# Patient Record
Sex: FEMALE | Race: WHITE | Employment: OTHER | URBAN - METROPOLITAN AREA
[De-identification: names, ages, dates, MRNs, and addresses within clinical notes are randomized per-mention and may not be internally consistent; named-entity substitution may affect disease eponyms.]

---

## 2017-03-13 ENCOUNTER — HOSPITAL ENCOUNTER (OUTPATIENT)
Dept: ULTRASOUND IMAGING | Age: 77
Discharge: HOME | End: 2017-03-13
Attending: UROLOGY
Payer: MEDICARE

## 2017-03-13 DIAGNOSIS — N20.0 KIDNEY STONES: ICD-10-CM

## 2017-03-13 PROCEDURE — 76770 US EXAM ABDO BACK WALL COMP: CPT

## 2017-06-22 ENCOUNTER — HOSPITAL ENCOUNTER (OUTPATIENT)
Dept: PHYSICAL THERAPY | Age: 77
Discharge: HOME | End: 2017-06-22
Payer: MEDICARE

## 2017-06-22 PROCEDURE — G8979 MOBILITY GOAL STATUS: HCPCS

## 2017-06-22 PROCEDURE — G8978 MOBILITY CURRENT STATUS: HCPCS

## 2017-06-22 PROCEDURE — 97110 THERAPEUTIC EXERCISES: CPT

## 2017-06-22 PROCEDURE — 97530 THERAPEUTIC ACTIVITIES: CPT

## 2017-06-22 PROCEDURE — 97162 PT EVAL MOD COMPLEX 30 MIN: CPT

## 2017-06-22 NOTE — PROGRESS NOTES
PHYSICAL THERAPY - DAILY TREATMENT NOTE    Patient Name: Bill Catalan        Date: 2017  : 1940   YES Patient  Verified  Visit #:   1     Insurance: Payor: Stefania Blanco / Plan: [place] MED[place] A & B / Product Type: Medicare /      In time: 11:00 Out time: 11:56   Total Treatment Time: 64     Medicare Time Tracking (below)   Total Timed Codes (min):  56 1:1 Treatment Time:  56     TREATMENT AREA =  R ankle    SUBJECTIVE    Pain Level (on 0 to 10 scale):  7  / 10   Medication Changes/New allergies or changes in medical history, any new surgeries or procedures? 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 Patient reports pain began now 19, reports she was at inside field hockey game and was sitting on bleachers and she tucked legs under her to let someone pass and back of heel hit back of bleachers, and pain began. Patient reports she then kept hitting heel on multiple things, and each time the pain got worse. 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 Patient reports sometimes pain subsides, however she has been having trouble walking, especially when having to quickly cross street.      Aggravating Factors: walking, standing, balancing on R foot    Alleviating Factors: ice, elevation, diclofenac cream    Previous Treatment:  Medication and cream, heel lift on R    PMHx:see chart    Social/Recreational/Work: enjoys shopping/antiquing, people watching at the mall, playing with grandchildren    Pt Goals: \"be able to walk better and the pain to go\"    FOTO: 40         OBJECTIVE  Physical Therapy Evaluation  - Foot and Ankle    Gait: [] Normal    [x] Abnormal    [x] Antalgic    [] NWB Device:  Describe: decreased weight shift R, decreased step length B, inability to advance R tibia over foot due to limited DF    ROM/Strength  [] Unable to assess at this time      AROM        PROM            Strength (1-5)   Left Right Left Right Left  Right   Dorsiflexion 6 -5   4+ 4+   Plantarflexion 54 40   4+ 4+   Eversion 14 3   4 4   Inversion 27 28   4 4   Great Toe Ext 65 67       DF with knee flexion 11 10         Flexibility: [] Unable to assess at this time  Gastroc:    (L) Tightness [] WNL   [] Min   [] Mod   [] Severe    (R) Tightness [] WNL   [] Min   [] Mod   [] Severe  Soleus:    (L) Tightness [] WNL   [] Min   [] Mod   [] Severe    (R) Tightness [] WNL   [] Min   [] Mod   [] Severe  Other:      (L) Tightness [] WNL   [] Min   [] Mod   [] Severe    (R) Tightness [] WNL   [] Min   [] Mod   [] Severe    Palpation: tightness noted over R>L plantar fascia, discomfort to palpation under R PF insertion, TTP posterior heel at achilles insertion    Optional Tests:   Navicular Drop: Right  mm Left  mm    Sub-talar alignment: [] Neutral     [] Pronation      [] Supination    Forefoot alignment:  [] Neutral     [] Varus            [] Valgus    Swelling:   Left (cm) Right (cm)   Figure 8:     Midfoot:      Malleoli Level:     MTH:        Anterior Drawer: [] Neg    [] Pos  Posterior Drawer:  [] Neg    [] Pos  Inversion Stress:  [] Neg    [] Pos  Talar Tilt:   [] Neg    [] Pos  Eversion Stress:  [] Neg    [] Pos  Kiana's Sign:  [] Neg    [] Pos  Garcias Test: [] Neg    [] Pos    15 min Therapeutic Exercise:  [x]  See flow sheet   Rationale:      increase ROM, increase strength and improve coordination to improve the patients ability to perform ADL's and amb with decreased pain and improved ease     10 min Therapeutic Activity: FOTO   Rationale:   Determine functional limitations     min Patient Education:  YES  Reviewed HEP   []  Progressed/Changed HEP based on:   HEP initiated     Other Objective/Functional Measures:    Patient reports going down stairs backwards, advised patient on decreased safety     Post Treatment Pain Level (on 0 to 10) scale:   6  / 10     ASSESSMENT    Assessment/Changes in Function:     Justification for Eval Code Complexity:  Patient History (low 0, mod 1-2, high 3-4): Hx significant kidney stones requiring multiple surgeries; allergies; R knee torn meniscus; sedentary; arthritis; back pain; BMI>30; previous accidents; kidney problems; visual impairment  Examination (low 1-2, mod 3+, high 4+):   Clinical Presentation (low stable or uncomplicated, mod evolving or changing, high unstable or unpredictable):   Clinical Decision Making (low , mod 26-74, high 1-25): FOTO     See PoC     []  See Progress Note/Recertification   Patient will continue to benefit from skilled PT services to modify and progress therapeutic interventions, address functional mobility deficits, address ROM deficits, address strength deficits, analyze and address soft tissue restrictions, analyze and cue movement patterns, analyze and modify body mechanics/ergonomics and assess and modify postural abnormalities to attain remaining goals.    Progress toward goals / Updated goals:    Goals established, see PoC     PLAN    [x]  Upgrade activities as tolerated YES Continue plan of care   []  Discharge due to :    [x]  Other: Initiate PoC     Therapist: Federico Goodpasture, PT    Date: 6/22/2017 Time: 11:04 AM

## 2017-06-22 NOTE — PROGRESS NOTES
[address] [facility] PHYSICAL THERAPY  [address] [facility], [address] - Phone: [phone]  Fax: [phone] / [address] [facility]  Patient Name: Hipolito Jerry : 1940   Medical   Diagnosis: Right ankle pain [M25.571] Treatment Diagnosis: R achilles tendonitis   Onset Date: Initial injury 10 years ago  Reoccurrence 17     Referral Source: Susannah Whitney MD [facility]): 2017   Prior Hospitalization: See medical history Provider #: [id number]   Prior Level of Function: enjoys shopping/antiquing, people watching at the mall, playing with grandchildren   Comorbidities: Hx significant kidney stones requiring multiple surgeries; allergies; R knee torn meniscus; sedentary; arthritis; back pain; BMI>30; previous accidents; kidney problems; visual impairment   Medications: Verified on Patient Summary List   The Plan of Care and following information is based on the information from the initial evaluation.   ===========================================================================================  Assessment / key information:  Hipolito Jerry is a 78 y.o.  female with Dx: Right ankle pain [M25.571], signs and symptoms consistent with R achilles tendonitis. Patient reports 10 years ago she injured R foot, and developed Daisy's deformity, and at that time treated with open backed shoes. Patient reports then on 17 she was sitting on bleachers and hit back of foot against metal back, and pain reoccurred. Patient reports Daisy's deformity has increased, and reports pain with amb/standing. Objective: Patient demonstrates impaired gait, including inability to advance R tibia over foot due to limited DF, significantly decreased R ankle AROM and decreased ankle strength B. Tightness noted over R>L plantar fascia, discomfort to palpation under R PF insertion, TTP posterior heel at achilles insertion. Patient presents with R heel lift in place in shoe. AROM                                                       Strength (1-5)    Left Right Left  Right   Dorsiflexion 6 -5 4+ 4+   Plantarflexion 54 40 4+ 4+   Eversion 14 3 4 4   Inversion 27 28 4 4   Great Toe Ext 65 67       DF with knee flexion 11 10         FOTO score 40 points indicating 60% limitation in functional ability. Patient would benefit from skilled PT to address the below listed impairments.  Thank you for your referral.  ===========================================================================================  Eval Complexity: History: HIGH Complexity :3+ comorbidities / personal factors will impact the outcome/ POC Exam:HIGH Complexity : 4+ Standardized tests and measures addressing body structure, function, activity limitation and / or participation in recreation  Presentation: MEDIUM Complexity : Evolving with changing characteristics  Clinical Decision Making:MEDIUM Complexity : FOTO score of 26-74Overall Complexity:MEDIUM  Problem List: pain affecting function, decrease ROM, decrease strength, edema affecting function, impaired gait/ balance, decrease ADL/ functional abilitiies, decrease activity tolerance, decrease flexibility/ joint mobility and decrease transfer abilities   Treatment Plan may include any combination of the following: Therapeutic exercise, Therapeutic activities, Neuromuscular re-education, Physical agent/modality, Gait/balance training, Manual therapy, Aquatic therapy, Patient education, Self Care training, Functional mobility training, Home safety training and Stair training  Patient / Family readiness to learn indicated by: asking questions, trying to perform skills and interest  Persons(s) to be included in education: patient (P)  Barriers to Learning/Limitations: None  Measures taken: na   Patient Goal (s): \"be able to walk better and the pain to go\"   Patient self reported health status: good  Rehabilitation Potential: good   Short Term Goals: To be accomplished in  2-3  weeks:  1. Patient will demonstrate compliance with HEP for symptom management at home. 2. Patient will improve R ankle DF to neutral to normalize gait mechanics. 3. Patient will improve R ankle PF to 50 deg to improve toe off in gait.  Long Term Goals: To be accomplished in  4-6  weeks:  1. Patient will be independent with HEP to self-manage and prevent symptoms upon DC. 2.  Patient will improve FOTO score to 53 points to indicate improved functional status. 3.  Patient will improve R ankle DF to 5 degrees to improve gait mechanics. 4.  Patient will improve B ankle EV/IV strength to 4+/5 to improve stability with amb. Frequency / Duration:   Patient to be seen  1-2  times per week for 4-6  weeks:  Patient / Caregiver education and instruction: self care, activity modification and exercises  G-Codes (GP): Mobility [id number] Current  CL= 60-79%   Goal  CK= 40-59%. The severity rating is based on the FOTO Score    Therapist Signature: Killian Perez PT Date: 0/33/0398   Certification Period: 6/22/17-9/21/17 Time: 11:04 AM   ===========================================================================================  I certify that the above Physical Therapy Services are being furnished while the patient is under my care. I agree with the treatment plan and certify that this therapy is necessary. Physician Signature:        Date:       Time:     Please sign and return to [facility] or you may fax the signed copy to [phone]. Thank you.

## 2017-06-26 ENCOUNTER — HOSPITAL ENCOUNTER (OUTPATIENT)
Dept: PHYSICAL THERAPY | Age: 77
Discharge: HOME | End: 2017-06-26
Payer: MEDICARE

## 2017-06-26 PROCEDURE — 97110 THERAPEUTIC EXERCISES: CPT

## 2017-06-26 PROCEDURE — 97140 MANUAL THERAPY 1/> REGIONS: CPT

## 2017-06-26 NOTE — PROGRESS NOTES
PHYSICAL THERAPY - DAILY TREATMENT NOTE    Patient Name: Sanjeev Jane        Date: 2017  : 1940   YES Patient  Verified  Visit #:   2     Insurance: Payor: Jake Hsucer / Plan: [place] MEDICARE PART A & B / Product Type: Medicare /      In time: 5:35 Out time: 6:25   Total Treatment Time: 50     Medicare Time Tracking (below)   Total Timed Codes (min):  50 1:1 Treatment Time:  50     TREATMENT AREA =  Right ankle pain [M25.571]    SUBJECTIVE    Pain Level (on 0 to 10 scale):  9  / 10   Medication Changes/New allergies or changes in medical history, any new surgeries or procedures? NO     If yes, update Summary List   Subjective Functional Status/Changes:  []  No changes reported     \"I have some questions about my exercises. \"          OBJECTIVE    40 min Therapeutic Exercise:  [x]  See flow sheet   Rationale:      increase ROM and increase strength to improve the patients ability to perform ADL's with improved arch stability. 10 min Manual Therapy: Repeated DF, STM to plantar fascia and surrounding intrinsics, posterior talocrural mob's grade 3-4, inversion mob's to the navicular grade 3-4   Rationale:      decrease pain, increase ROM and increase tissue extensibility to improve patient's ability to ambulate with improved mechanics. min Patient Education:  YES  Reviewed HEP   []  Progressed/Changed HEP based on:   Patient reports compliance     Other Objective/Functional Measures:    Initiated standing HR's with no increase in pain and noticed increased difficulty with eversion AROM exercises. Manual interventions decreased symptoms. See flowsheet for more detials. Post Treatment Pain Level (on 0 to 10) scale:     / 10     ASSESSMENT    Assessment/Changes in Function:     Pt presents to PT able to initiate an arch and hip girdle stablity program without complications.      []  See Progress Note/Recertification   Patient will continue to benefit from skilled PT services to modify and progress therapeutic interventions, address functional mobility deficits, address ROM deficits, address strength deficits, analyze and address soft tissue restrictions, analyze and cue movement patterns and analyze and modify body mechanics/ergonomics to attain remaining goals. Progress toward goals / Updated goals:    First follow-up visit. Addressed independence with HEP.      PLAN    [x]  Upgrade activities as tolerated YES Continue plan of care   []  Discharge due to :    []  Other:      Therapist: Karime Gaming    Date: 6/26/2017 Time: 6:33 PM     Future Appointments  Date Time Provider Hilario Polanco   6/28/2017 5:30 PM [address] [place] [address]   7/5/2017 11:30 AM Emile Black, [address] [place] [address]   7/7/2017 11:30 AM Emile Black [facility]   7/11/2017 2:30 PM Emile Black, [facility]   7/14/2017 11:30 AM Emile Black [facility]   7/18/2017 11:30 AM Emile Black [facility]   7/21/2017 2:00 PM Emile Black, [facility]   7/24/2017 11:30 AM Emile Black, [facility]   7/28/2017 2:00 PM Emile Black, [facility]   11/8/2017 1:15 PM Johann Tran MD [address] [place] [address]

## 2017-06-28 ENCOUNTER — HOSPITAL ENCOUNTER (OUTPATIENT)
Dept: PHYSICAL THERAPY | Age: 77
Discharge: HOME | End: 2017-06-28
Payer: MEDICARE

## 2017-06-28 PROCEDURE — 97530 THERAPEUTIC ACTIVITIES: CPT

## 2017-06-28 PROCEDURE — 97110 THERAPEUTIC EXERCISES: CPT

## 2017-06-28 PROCEDURE — 97140 MANUAL THERAPY 1/> REGIONS: CPT

## 2017-06-28 NOTE — PROGRESS NOTES
PHYSICAL THERAPY - DAILY TREATMENT NOTE    Patient Name: Ton Parsons        Date: 2017  : 1940   YES Patient  Verified  Visit #:   3     Insurance: Payor: Ladi Baxterd / Plan: [place] MEDICARE PART A & B / Product Type: Medicare /      In time: 5:30 Out time: 6:30   Total Treatment Time: 60     Medicare Time Tracking (below)   Total Timed Codes (min):  55 1:1 Treatment Time:  55     TREATMENT AREA =  Right ankle pain [M25.571]    SUBJECTIVE    Pain Level (on 0 to 10 scale):  7  / 10   Medication Changes/New allergies or changes in medical history, any new surgeries or procedures? NO     If yes, update Summary List   Subjective Functional Status/Changes:  []  No changes reported     \"I am a little better. It hurts in the back the most. I have a hard time with stairs\"          OBJECTIVE    Modalities Rationale:  palliative      min [] Estim, type/location:                                      []  att     []  unatt     []  w/US     []  w/ice    []  w/heat    min []  Mechanical Traction: type/lbs                   []  pro   []  sup   []  int   []  cont    []  before manual    []  after manual    min []  Ultrasound, settings/location:      min []  Iontophoresis w/ dexamethasone, location:                                               []  take home patch       []  in clinic   5 min []  Ice     []  Heat    location/position: Ice cup to calcaneus    min []  Vasopneumatic Device, press/temp:     min []  Other:    [x] Skin assessment post-treatment (if applicable):   []  intact    []  redness- no adverse reaction     []redness  adverse reaction:      39 min Therapeutic Exercise:  [x]  See flow sheet   Rationale:      increase ROM and increase strength to improve the patients ability to perform ADL's with improved arch stability and AROM.      8 min Manual Therapy: Repeated DF, STM to plantar fascia and surrounding intrinsics, posterior talocrural mob's grade 3-4, inversion mob's to the navicular grade 3-4    8 min Therapeutic Activity: Relative SLS with hip 3-way, stair negotiation. ,Rationale: To increase safety and efficiency with ADL's.     min Patient Education:  YES  Reviewed HEP   []  Progressed/Changed HEP based on:   Patient reports compliance     Other Objective/Functional Measures:    Pt requires vc's to lift marbles from the ankle vs the hip. Pt can perform toe yoga with minimal vc's. Pt has bursitis related pain with hip abduction, therefore only performed flexion/extension. Post Treatment Pain Level (on 0 to 10) scale:   6  / 10     ASSESSMENT    Assessment/Changes in Function:     Pt presents to PT with a program addressing limiting the pt's pronation with ADL's.     []  See Progress Note/Recertification   Patient will continue to benefit from skilled PT services to modify and progress therapeutic interventions, address functional mobility deficits, address ROM deficits, address strength deficits, analyze and address soft tissue restrictions, analyze and cue movement patterns and analyze and modify body mechanics/ergonomics to attain remaining goals. Addressing DF AROM with manual interventions and HEP.      PLAN    [x]  Upgrade activities as tolerated YES Continue plan of care   []  Discharge due to :    []  Other:      Therapist: Lauren Waite    Date: 6/28/2017 Time: 6:47 PM     Future Appointments  Date Time Provider Hilario Polanco   7/5/2017 11:30 AM Douglas Dodd [address] [place] [address]   7/7/2017 11:30 AM Douglas Dodd PT [facility]   7/11/2017 2:30 PM Douglas Dodd PT [facility]   7/14/2017 11:30 AM Douglas Dodd PT [facility]   7/18/2017 11:30 AM Douglas Dodd PT [facility]   7/21/2017 2:00 PM Douglas Dodd PT [facility]   7/24/2017 11:30 AM Douglas Dodd PT [facility]   7/28/2017 2:00 PM Douglas Dodd PT [facility]   11/8/2017 1:15 PM Hillary Cronin MD [place] [address]

## 2017-07-05 ENCOUNTER — HOSPITAL ENCOUNTER (OUTPATIENT)
Dept: PHYSICAL THERAPY | Age: 77
Discharge: HOME | End: 2017-07-05
Payer: MEDICARE

## 2017-07-05 PROCEDURE — 97110 THERAPEUTIC EXERCISES: CPT

## 2017-07-05 PROCEDURE — 97140 MANUAL THERAPY 1/> REGIONS: CPT

## 2017-07-05 NOTE — PROGRESS NOTES
PHYSICAL THERAPY - DAILY TREATMENT NOTE    Patient Name: Cortez Moya        Date: 2017  : 1940   YES Patient  Verified  Visit #:     Insurance: Payor: Dany Naylor / Plan: [place] MEDICARE PART A & B / Product Type: Medicare /      In time: 11:28 Out time: 12:15   Total Treatment Time: 47     Medicare Time Tracking (below)   Total Timed Codes (min):  37 1:1 Treatment Time:  37     TREATMENT AREA =  Right ankle pain [M25.571]    SUBJECTIVE    Pain Level (on 0 to 10 scale):  7  / 10   Medication Changes/New allergies or changes in medical history, any new surgeries or procedures? NO     If yes, update Summary List   Subjective Functional Status/Changes:  []  No changes reported     \"Pt states no changes. \"          OBJECTIVE    Modalities Rationale:  palliative      min [] Estim, type/location:                                      []  att     []  unatt     []  w/US     []  w/ice    []  w/heat    min []  Mechanical Traction: type/lbs                   []  pro   []  sup   []  int   []  cont    []  before manual    []  after manual    min []  Ultrasound, settings/location:      min []  Iontophoresis w/ dexamethasone, location:                                               []  take home patch       []  in clinic   10 min [x]  Ice     []  Heat    location/position: Melgar's with LE wedge to R post ankle    min []  Vasopneumatic Device, press/temp:     min []  Other:    [x] Skin assessment post-treatment (if applicable):   [x]  intact    []  redness- no adverse reaction     []redness  adverse reaction:      29 min Therapeutic Exercise:  [x]  See flow sheet   Rationale:      increase ROM, increase strength, improve coordination, improve balance and increase proprioception to improve the patients ability to perform ADL's and amb with decreased pain and improved ease     8 min Manual Therapy: STM/TPR to plantar fascia and intrinsics, grade 2-3 talocrural distraction with scoop, grade 2-3 post talocrural mobs   Rationale:      decrease pain, increase ROM, increase tissue extensibility and decrease trigger points to improve patient's ability to perform ADL's and OH reach with decreased pain and improved ease     min Patient Education:  YES  Reviewed HEP   []  Progressed/Changed HEP based on:   Patient reports compliance     Other Objective/Functional Measures:    Patient reports pain with std hip abd/ext, performed flex only  Min cueing for form with therex, mod for form with clams to avoid pelvic rotation backwards  Mod cueing to avoid guarding with manual     Post Treatment Pain Level (on 0 to 10) scale:   6  / 10     ASSESSMENT    Assessment/Changes in Function:     Patient is progressing slowly with ROM and intrinsic strengthening     []  See Progress Note/Recertification   Patient will continue to benefit from skilled PT services to modify and progress therapeutic interventions, address functional mobility deficits, address ROM deficits, address strength deficits, analyze and address soft tissue restrictions, analyze and cue movement patterns, analyze and modify body mechanics/ergonomics and assess and modify postural abnormalities to attain remaining goals. Progress toward goals / Updated goals: · Short Term Goals: To be accomplished in  2-3  weeks:  1. Patient will demonstrate compliance with HEP for symptom management at home. 2. Patient will improve R ankle DF to neutral to normalize gait mechanics. Assessed by gastro-soleus stretches. 3. Patient will improve R ankle PF to 50 deg to improve toe off in gait. · Long Term Goals: To be accomplished in  4-6  weeks:  1. Patient will be independent with HEP to self-manage and prevent symptoms upon DC. 2.  Patient will improve FOTO score to 53 points to indicate improved functional status. 3.  Patient will improve R ankle DF to 5 degrees to improve gait mechanics.   4.  Patient will improve B ankle EV/IV strength to 4+/5 to improve stability with amb.     PLAN    [x]  Upgrade activities as tolerated YES Continue plan of care   []  Discharge due to :    []  Other:      Therapist: Douglas Alvares PT    Date: 7/5/2017 Time: 11:26 AM     Future Appointments  Date Time Provider Hilario Ramosi   7/5/2017 11:30 AM Douglas Alvares, [address] [place] [address]   7/7/2017 11:30 AM Douglas Alvares, [address] [place] [address]   7/11/2017 2:30 PM Douglas Alvares PT [facility]   7/14/2017 11:30 AM Douglas Alvares PT [facility]   7/18/2017 11:30 AM Douglas Alvares PT [facility]   7/21/2017 2:00 PM Douglas Alvares PT [facility]   7/24/2017 11:30 AM Douglas Alvares PT [facility]   7/28/2017 2:00 PM Douglas Alvares PT [facility]   11/8/2017 1:15 PM Bryant Anders MD [id number]

## 2017-07-07 ENCOUNTER — HOSPITAL ENCOUNTER (OUTPATIENT)
Dept: PHYSICAL THERAPY | Age: 77
Discharge: HOME | End: 2017-07-07
Payer: MEDICARE

## 2017-07-07 PROCEDURE — 97140 MANUAL THERAPY 1/> REGIONS: CPT

## 2017-07-07 PROCEDURE — 97110 THERAPEUTIC EXERCISES: CPT

## 2017-07-07 NOTE — PROGRESS NOTES
PHYSICAL THERAPY - DAILY TREATMENT NOTE    Patient Name: Khurram Multani        Date: 2017  : 1940   YES Patient  Verified  Visit #:   5     Insurance: Payor: Zaldivar Leslie / Plan: [place] MED[place]ARE PART A & B / Product Type: Medicare /      In time: 11:24 Out time: 12:20   Total Treatment Time: 64     Medicare Time Tracking (below)   Total Timed Codes (min):  46 1:1 Treatment Time:  46     TREATMENT AREA =  Right ankle pain [M25.571]    SUBJECTIVE    Pain Level (on 0 to 10 scale):  7  / 10   Medication Changes/New allergies or changes in medical history, any new surgeries or procedures? NO     If yes, update Summary List   Subjective Functional Status/Changes:  []  No changes reported     \" My pain is at a 7. I have been wearing different shoes in the house. After last time my arch didn't hurt nearly as bad as it used to. \"          OBJECTIVE    Modalities Rationale:  Palliative    min [] Estim, type/location:                                      []  att     []  unatt     []  w/US     []  w/ice    []  w/heat    min []  Mechanical Traction: type/lbs                   []  pro   []  sup   []  int   []  cont    []  before manual    []  after manual    min []  Ultrasound, settings/location:      min []  Iontophoresis w/ dexamethasone, location:                                               []  take home patch       []  in clinic   10 min [x]  Ice     []  Heat    location/position: R ankle, reclined with LE wedge    min []  Vasopneumatic Device, press/temp:     min []  Other:    [x] Skin assessment post-treatment (if applicable):   [x]  intact    []  redness- no adverse reaction     []redness  adverse reaction:  n/a    36 min Therapeutic Exercise:  [x]  See flow sheet   Rationale:      increase ROM, increase strength, improve balance and increase proprioception to improve the patients ability to perform ADLs with increased ease and decreased pain.      Rationale:     10 min Manual Therapy: Trigger Point massage, DF mobilization, grade 2-3 talocrural distraction, as well as distraction with scoop, pt and spouse education on manual techniques. Rationale:      decrease pain, increase ROM, increase tissue extensibility, decrease edema  and decrease trigger points to improve patient's ability to perform ADLs with increased ease and decrease pain. min Patient Education:  YES  Reviewed HEP   []  Progressed/Changed HEP based on:   Patient reports compliance     Other Objective/Functional Measures:    Pt tolerated session well. Progressed therex including mini-squats. Minimal cueing for hip position during clams and for proper body position during mini-squats. Able to complete hip 3-way in all planes in decreased range on L   Post Treatment Pain Level (on 0 to 10) scale:   6 / 10     ASSESSMENT    Assessment/Changes in Function:     Pt tolerated session well. Progressed therex and minimal cueing with exercises. Educated family on manual techniques to complete with pt at home, to further progress DF AROM. []  See Progress Note/Recertification   Patient will continue to benefit from skilled PT services to modify and progress therapeutic interventions, address functional mobility deficits, address ROM deficits, address strength deficits, analyze and address soft tissue restrictions, analyze and cue movement patterns, analyze and modify body mechanics/ergonomics and assess and modify postural abnormalities to attain remaining goals. Progress toward goals / Updated goals: · Short Term Goals: To be accomplished in  2-3  weeks:  1. Patient will demonstrate compliance with HEP for symptom management at home. 2. Patient will improve R ankle DF to neutral to normalize gait mechanics. 3. Patient will improve R ankle PF to 50 deg to improve toe off in gait. · Long Term Goals: To be accomplished in  4-6  weeks:  1. Patient will be independent with HEP to self-manage and prevent symptoms upon DC.   2. Patient will improve FOTO score to 53 points to indicate improved functional status. 3.  Patient will improve R ankle DF to 5 degrees to improve gait mechanics. 4.  Patient will improve B ankle EV/IV strength to 4+/5 to improve stability with amb.        PLAN    [x]  Upgrade activities as tolerated YES Continue plan of care   []  Discharge due to :    []  Other:      Therapist: Navin Hobson PT    Date: 7/7/2017 Time: 11:28 AM     Future Appointments  Date Time Provider Hilario Polanco   7/7/2017 11:30 AM Navin Hobson PT [facility]   7/11/2017 2:30 PM Navin Hobson, [address] [place] [address]   7/14/2017 11:30 AM Navin Hobson PT [facility]   7/18/2017 11:30 AM Nvain Hobson PT [facility]   7/21/2017 2:00 PM Navin Hobson PT [facility]   7/24/2017 11:30 AM Navin Hobson PT [facility]   7/28/2017 2:00 PM Navin Hobson PT [facility]   11/8/2017 1:15 PM Javed Joiner MD [id number]

## 2017-07-11 ENCOUNTER — HOSPITAL ENCOUNTER (OUTPATIENT)
Dept: PHYSICAL THERAPY | Age: 77
Discharge: HOME | End: 2017-07-11
Payer: MEDICARE

## 2017-07-11 PROCEDURE — 97110 THERAPEUTIC EXERCISES: CPT

## 2017-07-11 PROCEDURE — 97140 MANUAL THERAPY 1/> REGIONS: CPT

## 2017-07-11 NOTE — PROGRESS NOTES
PHYSICAL THERAPY - DAILY TREATMENT NOTE    Patient Name: Bridgette Shay        Date: 2017  : 1940   YES Patient  Verified  Visit #:   6     Insurance: Payor: José Miguel Fraction / Plan: [place] MEDICARE PART A & B / Product Type: Medicare /      In time: 2:30 Out time: 3:30   Total Treatment Time: 60     Medicare Time Tracking (below)   Total Timed Codes (min):  50 1:1 Treatment Time:  30     TREATMENT AREA =  Right ankle pain [M25.571]    SUBJECTIVE    Pain Level (on 0 to 10 scale):  6  / 10   Medication Changes/New allergies or changes in medical history, any new surgeries or procedures? NO     If yes, update Summary List   Subjective Functional Status/Changes:  []  No changes reported     \"I feel good but my pain is a 6/10. \"          OBJECTIVE    Modalities Rationale:  Palliative    min [] Estim, type/location:                                      []  att     []  unatt     []  w/US     []  w/ice    []  w/heat    min []  Mechanical Traction: type/lbs                   []  pro   []  sup   []  int   []  cont    []  before manual    []  after manual    min []  Ultrasound, settings/location:      min []  Iontophoresis w/ dexamethasone, location:                                               []  take home patch       []  in clinic   10 min [x]  Ice     []  Heat    location/position: R medial ankle    min []  Vasopneumatic Device, press/temp:     min []  Other:    [x] Skin assessment post-treatment (if applicable):   [x]  intact    []  redness- no adverse reaction     []redness  adverse reaction:      42 (22) min Therapeutic Exercise:  [x]  See flow sheet   Rationale:      increase ROM, increase strength, improve balance and increase proprioception to improve the patients ability to perform ADLs with ease and decrease pain with ambulation. 8 min Manual Therapy: R talocrural distraction with scoop, grade 2-3 talocrural distraction, and deep tissue massage to plantar fascia.     Rationale: decrease pain, increase ROM, increase tissue extensibility and decrease trigger points to improve patient's ability to increase ROM in DF, increase gastro/soleus flexibility for amb and decrease pain in plantar fascia. min Patient Education:  YES  Reviewed HEP   []  Progressed/Changed HEP based on:   Patient reports compliance     Other Objective/Functional Measures:    Minimal cueing for hip positioning with clams, mini squat posture, and applying appropriate pressure of ball roll. Post Treatment Pain Level (on 0 to 10) scale:   6  / 10     ASSESSMENT    Assessment/Changes in Function:     Minimal cueing for therex. []  See Progress Note/Recertification   Patient will continue to benefit from skilled PT services to modify and progress therapeutic interventions, address functional mobility deficits, address ROM deficits, address strength deficits, analyze and address soft tissue restrictions, analyze and cue movement patterns, analyze and modify body mechanics/ergonomics and assess and modify postural abnormalities to attain remaining goals. Progress toward goals / Updated goals: · Short Term Goals: To be accomplished in  2-3  weeks:  1. Patient will demonstrate compliance with HEP for symptom management at home. 2. Patient will improve R ankle DF to neutral to normalize gait mechanics. 3. Patient will improve R ankle PF to 50 deg to improve toe off in gait. · Long Term Goals: To be accomplished in  4-6  weeks:  1. Patient will be independent with HEP to self-manage and prevent symptoms upon DC. 2.  Patient will improve FOTO score to 53 points to indicate improved functional status. 3.  Patient will improve R ankle DF to 5 degrees to improve gait mechanics. 4.  Patient will improve B ankle EV/IV strength to 4+/5 to improve stability with amb.      PLAN    [x]  Upgrade activities as tolerated YES Continue plan of care   []  Discharge due to :    []  Other:      Therapist: Master Soto, PT Date: 7/11/2017 Time: 2:25 PM     Future Appointments  Date Time Provider Hilario Ramosi   7/11/2017 2:30 PM Tess Sifuentes PT [facility]   7/14/2017 11:30 AM Tess Sifuentes PT [facility]   7/18/2017 11:30 AM Tess Sifuentes PT [facility]   7/21/2017 2:00 PM Tess Sifuentes PT [facility]   7/24/2017 11:30 AM Tess Sifuentes PT [facility]   7/28/2017 2:00 PM Tess Sifuentes PT [facility]   11/8/2017 1:15 PM Brock Brice MD [address] [facility]

## 2017-07-14 ENCOUNTER — HOSPITAL ENCOUNTER (OUTPATIENT)
Dept: PHYSICAL THERAPY | Age: 77
Discharge: HOME | End: 2017-07-14
Payer: MEDICARE

## 2017-07-14 PROCEDURE — 97140 MANUAL THERAPY 1/> REGIONS: CPT

## 2017-07-14 PROCEDURE — 97110 THERAPEUTIC EXERCISES: CPT

## 2017-07-14 NOTE — PROGRESS NOTES
PHYSICAL THERAPY - DAILY TREATMENT NOTE    Patient Name: Mindy Ashton        Date: 2017   1940   YES Patient  Verified  Visit #:     Insurance: Payor: Ana Sevilla / Plan: [place] MEDICARE PART A & B / Product Type: Medicare /      In time: 11:20 Out time: 12:19   Total Treatment Time: 61     Medicare Time Tracking (below)   Total Timed Codes (min):  49  1:1 Treatment Time:  46     TREATMENT AREA =  Right ankle pain [M25.571]    SUBJECTIVE    Pain Level (on 0 to 10 scale):  7  / 10   Medication Changes/New allergies or changes in medical history, any new surgeries or procedures? NO     If yes, update Summary List   Subjective Functional Status/Changes:  []  No changes reported     \"I am good my pain is at a 7. I am going to show you this one exercise that I have been doing at home and let me know if it might be making my heel hurt. \"          OBJECTIVE    Modalities Rationale:  Palliative      min [] Estim, type/location:                                      []  att     []  unatt     []  w/US     []  w/ice    []  w/heat    min []  Mechanical Traction: type/lbs                   []  pro   []  sup   []  int   []  cont    []  before manual    []  after manual    min []  Ultrasound, settings/location:      min []  Iontophoresis w/ dexamethasone, location:                                               []  take home patch       []  in clinic   10 min [x]  Ice     []  Heat    location/position: R ankle with LE wedge    min []  Vasopneumatic Device, press/temp:     min []  Other:    [x] Skin assessment post-treatment (if applicable):   [x]  intact    []  redness- no adverse reaction     []redness  adverse reaction:      41 (38) min Therapeutic Exercise:  [x]  See flow sheet   Rationale:      increase ROM, increase strength, improve balance and increase proprioception to improve the patients ability to perform ADLs with ease and decrease pain with amb.      8 min Manual Therapy: R talocrural distraction with scoop, grade 2-3 talocrural distraction, and deep tissue massage to plantar fascia. Rationale:      decrease pain, increase ROM, increase tissue extensibility and decrease trigger points to improve patient's ability to in DF ROM, increase gastro/soleus flexibility for amb and decrease pain in plantar fascia. min Patient Education:  YES  Reviewed HEP   []  Progressed/Changed HEP based on:   Patient reports compliance     Other Objective/Functional Measures:    Hip 3 way causes pt pain in lateral hip, possible related to bursitis, DC for future session. Glute kicks to be incorporated next visit. Minimal cueing with other therex. Instructed pt in proper positioning for gastro/soleus stretch she performs at home. Pt tolerated session well, pain decreased to a 4. Post Treatment Pain Level (on 0 to 10) scale:   4  / 10     ASSESSMENT    Assessment/Changes in Function:     Minimal cueing for therex, DC 3 way hip for future sessions. Pt tolerated session well.      []  See Progress Note/Recertification   Patient will continue to benefit from skilled PT services to modify and progress therapeutic interventions, address functional mobility deficits, address ROM deficits, address strength deficits, analyze and address soft tissue restrictions, analyze and cue movement patterns, analyze and modify body mechanics/ergonomics and assess and modify postural abnormalities to attain remaining goals. Progress toward goals / Updated goals: · Short Term Goals: To be accomplished in  2-3  weeks:  1. Patient will demonstrate compliance with HEP for symptom management at home. 2. Patient will improve R ankle DF to neutral to normalize gait mechanics. 3. Patient will improve R ankle PF to 50 deg to improve toe off in gait. · Long Term Goals: To be accomplished in  4-6  weeks:  1.  Patient will be independent with HEP to self-manage and prevent symptoms upon DC.   2.  Patient will improve FOTO score to 53 points to indicate improved functional status. 3.  Patient will improve R ankle DF to 5 degrees to improve gait mechanics. 4.  Patient will improve B ankle EV/IV strength to 4+/5 to improve stability with amb.      PLAN    [x]  Upgrade activities as tolerated YES Continue plan of care   []  Discharge due to :    []  Other:      Therapist: Navin Hobson PT    Date: 7/14/2017 Time: 11:19 AM     Future Appointments  Date Time Provider Hilario Polanco   7/14/2017 11:30 AM Navin Hobson [address] [place] [address]   7/18/2017 11:30 AM Navin Hobson [address] [place] [address]   7/21/2017 2:00 PM Navin Hobson PT [facility]   7/24/2017 11:30 AM Navin Hobson PT [facility]   7/28/2017 2:00 PM Navin Hobson PT [facility]   11/8/2017 1:15 PM Javed Joiner MD [address]

## 2017-07-18 ENCOUNTER — HOSPITAL ENCOUNTER (OUTPATIENT)
Dept: PHYSICAL THERAPY | Age: 77
Discharge: HOME | End: 2017-07-18
Payer: MEDICARE

## 2017-07-18 PROCEDURE — 97140 MANUAL THERAPY 1/> REGIONS: CPT

## 2017-07-18 PROCEDURE — 97110 THERAPEUTIC EXERCISES: CPT

## 2017-07-21 ENCOUNTER — HOSPITAL ENCOUNTER (OUTPATIENT)
Dept: PHYSICAL THERAPY | Age: 77
Discharge: HOME | End: 2017-07-21
Payer: MEDICARE

## 2017-07-21 PROCEDURE — 97110 THERAPEUTIC EXERCISES: CPT | Performed by: PHYSICAL THERAPIST

## 2017-07-21 NOTE — PROGRESS NOTES
PHYSICAL THERAPY - DAILY TREATMENT NOTE    Patient Name: Gerald Francis        Date: 2017  : 1940   YES Patient  Verified  Visit #:     Insurance: Payor: Elizabeth Keene / Plan: [place] MEDICARE PART A & B / Product Type: Medicare /      In time: 2:00 Out time: 2:45   Total Treatment Time: 30     Medicare Time Tracking (below)   Total Timed Codes (min):  30 1:1 Treatment Time:  30     TREATMENT AREA =  R Ankle Pain    SUBJECTIVE  Pain Level (on 0 to 10 scale):  6  / 10   Medication Changes/New allergies or changes in medical history, any new surgeries or procedures? NO    If yes, update Summary List   Subjective Functional Status/Changes:  []  No changes reported     Pt reports continued pain in heel when walking, and it has not been getting any better for the past week.           OBJECTIVE  Modalities Rationale:     decrease edema, decrease inflammation and decrease pain to improve patient's ability to prevent soreness   min [] Estim, type/location:                                      []  att     []  unatt     []  w/US     []  w/ice    []  w/heat    min []  Mechanical Traction: type/lbs                   []  pro   []  sup   []  int   []  cont    []  before manual    []  after manual    min []  Ultrasound, settings/location:      min []  Iontophoresis w/ dexamethasone, location:                                               []  take home patch       []  in clinic   10 min [x]  Ice     []  Heat    location/position: R medial ankle - supine after treatment    min []  Vasopneumatic Device, press/temp:     min []  Other:    [] Skin assessment post-treatment (if applicable):    []  intact    []  redness- no adverse reaction     []redness  adverse reaction:        30 min Therapeutic Exercise:  [x]  See flow sheet   Rationale:      increase ROM, increase strength and improve coordination to improve the patients ability to increase walking tolerance        min Therapeutic Activity:         min Neuromuscular Re-ed:         min Gait Training:       min Patient Education:  YES  Reviewed HEP   []  Progressed/Changed HEP based on: Other Objective/Functional Measures:    Pt has significant gastroc tightness, and was encouraged to find a gastroc stretch that could be done without having posterior heel pain. She was restarted on supine gastroc stretch w strap, followed by standing therex. When standing, she required repeated VCs to maintain full knee extension. Post Treatment Pain Level (on 0 to 10) scale:   6 / 10     ASSESSMENT  Assessment/Changes in Function:     Pt felt better when first standing to walk after stretching. She was encouraged to increase use of cold pack through the weekend and avoid heel pain. []  See Progress Note/Recertification   Patient will continue to benefit from skilled PT services to modify and progress therapeutic interventions, address functional mobility deficits, address ROM deficits, address strength deficits, analyze and address soft tissue restrictions, analyze and cue movement patterns, analyze and modify body mechanics/ergonomics and assess and modify postural abnormalities to attain remaining goals. Progress toward goals / Updated goals: Will continue to progress flexibility as tolerated.      PLAN  [x]  Upgrade activities as tolerated YES Continue plan of care   []  Discharge due to :    []  Other:      Therapist: Urmila Santos, PT    Date: 7/21/2017 Time: 8:40 AM     Future Appointments  Date Time Provider Hilario Polanco   7/21/2017 2:00 PM [address]   7/24/2017 11:30 AM Heather Sherman PT [facility]   7/28/2017 2:00 PM Heather Sherman PT [facility]   11/8/2017 1:15 PM Mayito Heart MD [address] [facility]

## 2017-07-24 ENCOUNTER — HOSPITAL ENCOUNTER (OUTPATIENT)
Dept: PHYSICAL THERAPY | Age: 77
Discharge: HOME | End: 2017-07-24
Payer: MEDICARE

## 2017-07-24 PROCEDURE — 97530 THERAPEUTIC ACTIVITIES: CPT

## 2017-07-24 PROCEDURE — G8978 MOBILITY CURRENT STATUS: HCPCS

## 2017-07-24 PROCEDURE — 97110 THERAPEUTIC EXERCISES: CPT

## 2017-07-24 PROCEDURE — G8979 MOBILITY GOAL STATUS: HCPCS

## 2017-07-24 NOTE — PROGRESS NOTES
PHYSICAL THERAPY - DAILY TREATMENT NOTE    Patient Name: Miriam Lin        Date: 2017  : 1940   YES Patient  Verified  Visit #:   10     Insurance: Payor: Sophia Baig / Plan: [place] MEDICARE PART A & B / Product Type: Medicare /      In time: 11:32 Out time: 12:20   Total Treatment Time: 48     Medicare Time Tracking (below)   Total Timed Codes (min):  38 1:1 Treatment Time:  38     TREATMENT AREA =  Right ankle pain [M25.571]    SUBJECTIVE    Pain Level (on 0 to 10 scale):  7  / 10   Medication Changes/New allergies or changes in medical history, any new surgeries or procedures? NO     If yes, update Summary List   Subjective Functional Status/Changes:  []  No changes reported     \"My heel has been hurting. This one stretch I do at home really hurts it. I did not do it Friday\"   , Rutbeti Bell, and . \"       OBJECTIVE    Modalities Rationale:  Palliative      min [] Estim, type/location:                                      []  att     []  unatt     []  w/US     []  w/ice    []  w/heat    min []  Mechanical Traction: type/lbs                   []  pro   []  sup   []  int   []  cont    []  before manual    []  after manual    min []  Ultrasound, settings/location:      min []  Iontophoresis w/ dexamethasone, location:                                               []  take home patch       []  in clinic   10 min [x]  Ice     []  Heat    location/position: R ankle, supine w/ wedge      min []  Vasopneumatic Device, press/temp:     min []  Other:    [x] Skin assessment post-treatment (if applicable):   [x]  intact    []  redness- no adverse reaction     []redness  adverse reaction:    26 min Therapeutic Exercise:  [x]  See flow sheet   Rationale:      increase ROM, increase strength, improve balance and increase proprioception to improve the patients ability to perform ADLs with greater ease and decrease p! With amb. 12 min Therapeutic Activity: FOTO completed with pt.  Pt verbally reports little to no improvement but reported a score of a 5 on the GROC. FOTO score decreased from a 40% to a 38%. .Discussed proper completion of HEP   Rationale: To reassess functional improvement and to promote create independence w/ HEP increase progress towards goals. min Patient Education:  YES  Reviewed HEP   []  Progressed/Changed HEP based on:   Patient reports compliance     Other Objective/Functional Measures: Moderate cueing for gastro-soleus stretching. Pt instructed to use towel/sheet instead of previously used stretchy TB. Pt reports taking tylenol for inflammation. Pt advised to try ibuprofen instead. Pt verbalized understanding. Pt reports HEP completion multiple times a day but when asked pt could not demonstrate HEP correctly and had multiple questions regarding positioning. PT demonstrate exercises w/ proper positioning and observed pt's performance and answer questions. Pt verbalized understanding of HEP. Post Treatment Pain Level (on 0 to 10) scale:   6  / 10     ASSESSMENT    Assessment/Changes in Function:      See progress note. []  See Progress Note/Recertification   Patient will continue to benefit from skilled PT services to modify and progress therapeutic interventions, address functional mobility deficits, address ROM deficits, address strength deficits, analyze and address soft tissue restrictions, analyze and cue movement patterns, analyze and modify body mechanics/ergonomics and assess and modify postural abnormalities to attain remaining goals. Progress toward goals / Updated goals: · Short Term Goals: To be accomplished in  2-3  weeks:  1. Patient will demonstrate compliance with HEP for symptom management at home. 2. Patient will improve R ankle DF to neutral to normalize gait mechanics. 3. Patient will improve R ankle PF to 50 deg to improve toe off in gait. · Long Term Goals:  To be accomplished in  4-6  weeks:  1.  Patient will be independent with HEP to self-manage and prevent symptoms upon DC. 2.  Patient will improve FOTO score to 53 points to indicate improved functional status. 3.  Patient will improve R ankle DF to 5 degrees to improve gait mechanics.   4.  Patient will improve B ankle EV/IV strength to 4+/5 to improve stability with amb     PLAN    [x]  Upgrade activities as tolerated YES Continue plan of care   []  Discharge due to :    []  Other:      Therapist: Douglas Dodd PT    Date: 7/24/2017 Time: 11:57 AM     Future Appointments  Date Time Provider Hilario Polanco   7/28/2017 2:00 PM Douglas Dodd PT [facility]   11/8/2017 1:15 PM Hillary Cronin MD [address] [place] [address]

## 2017-07-24 NOTE — PROGRESS NOTES
[address] [facility] PHYSICAL THERAPY  [address] [facility] [place] [address], [address] - Phone: [phone]  Fax: [phone]  [place]          Patient Name: Scooter Ruiz : 1940   Treatment/Medical Diagnosis: Right ankle pain [M25.571]   Onset Date: Initial Injury 10 years ago. Reoccurrence 17    Referral Source: Adam Stevenson MD [facility]): 2017   Prior Hospitalization: See Medical History Provider #: [id number]   Prior Level of Function: enjoys shopping/antiquing, people watching at the mall, playing with grandchildren   Comorbidities: Hx significant kidney stones requiring multiple surgeries; allergies; R knee torn meniscus; sedentary; arthritis; back pain; BMI>30; previous accidents; kidney problems; visual impairment   Medications: Verified on Patient Summary List   Visits from [facility]: 10 Missed Visits: 0     Goal/Measure of Progress Goal Met? 1. Patient will be independent with HEP to self-manage and prevent symptoms upon DC. Status at last Eval: HEP Issued  Current Status: Unable to demonstrate no   2. Patient will improve R ankle DF to neutral to normalize gait mechanics. Status at last Eval: -5 degs. Current Status: -4 degs  no   3. Patient will improve R ankle PF to 50 deg to improve toe off in gait. Status at last Eval: 40 degs Current Status: 40 degs no   4. Patient will improve FOTO score to 53 points to indicate improved functional status. Status at last Eval: 40 Current Status: 38 no     5. Patient will improve B ankle EV/IV strength to 4+/5 to improve stability with amb. Status at last Eval: 4 Current Status: 4+ yes     Therapy has consisted of therex, ROM, balance training, manual therapy. Key Functional Changes/Progress: Pt is making minimal progress. She preserverates on heel pain and is easily distracted during therex.  Therex have been demonstrated during session but pt shows poor retention of exercise positioning. Pt reports HEP completion multiple times a day but when asked pt could not demonstrate HEP correctly and had multiple questions regarding positioning. Pt performs unadvised and excessive exercise/stretching. FOTO completed and pt reports little to no improvement but reported a score of a 5 on the [facility] RANDELL which indicates a \"good deal better\". FOTO score has decreased from a 40% to a 38% indicating 62% limitation in functional ability. Problem List: pain affecting function, decrease ROM, decrease strength, impaired gait/ balance, decrease ADL/ functional abilitiies, decrease activity tolerance and decrease flexibility/ joint mobility   Treatment Plan may include any combination of the following: Therapeutic exercise, Therapeutic activities, Neuromuscular re-education, Physical agent/modality, Gait/balance training, Manual therapy, Aquatic therapy, Patient education, Functional mobility training and Stair training  Patient Goal(s) has been updated and includes:      Goals for this certification period include and are to be achieved in  2-3  weeks:  1.  Patient will be independent with HEP to self-manage and prevent symptoms upon DC. 2.  Patient will improve FOTO score to 53 points to indicate improved functional status. 3.  Patient will improve R ankle DF to 5 degrees to improve gait mechanics. 4. Patient will improve R ankle PF to 50 deg to improve toe off in gait. Frequency / Duration:   Patient to be seen   2  times per week for   2-4    weeks:  G-Codes (GP): Mobility [id number] Current  CL= 60-79%   Goal  CK= 40-59%. The severity rating is based on the FOTO Score    Assessments/Recommendations: Pt would benefit from ongoing physical therapy to further improve ROM, strength, and increase independence with HEP. If you have any questions/comments please contact us directly at [phone].    Thank you for allowing us to assist in the care of your patient. Therapist Signature: Humberto Lennox, PT Date: 8/92/5731   Certification Period:  Reporting Period: 6/22/17-9/21/17 6/22/17-7/24/17 Time: 4:21 PM   NOTE TO PHYSICIAN:  PLEASE COMPLETE THE ORDERS BELOW AND FAX TO   [facility] Physical Therapy: [phone]. If you are unable to process this request in 24 hours please contact our office: [phone].    ___ I have read the above report and request that my patient continue as recommended.   ___ I have read the above report and request that my patient continue therapy with the following changes/special instructions: ________________________________________________   ___ I have read the above report and request that my patient be discharged from therapy.      Physician Signature:        Date:       Time:

## 2017-07-28 ENCOUNTER — HOSPITAL ENCOUNTER (OUTPATIENT)
Dept: PHYSICAL THERAPY | Age: 77
Discharge: HOME | End: 2017-07-28
Payer: MEDICARE

## 2017-07-28 PROCEDURE — 97033 APP MDLTY 1+IONTPHRSIS EA 15: CPT

## 2017-07-28 PROCEDURE — 97110 THERAPEUTIC EXERCISES: CPT

## 2017-07-28 NOTE — PROGRESS NOTES
PHYSICAL THERAPY - DAILY TREATMENT NOTE    Patient Name: Bryan Kenny        Date: 2017  : 1940   YES Patient  Verified  Visit #:     Insurance: Payor: Valerie Monticello / Plan: [place] & [place] / Product Type: Medicare /      In time: 1:55  Out time: 2:45   Total Treatment Time: 50      Medicare Time Tracking (below)   Total Timed Codes (min):  50  1:1 Treatment Time:  30     TREATMENT AREA =  Right ankle pain [M25.571]    SUBJECTIVE    Pain Level (on 0 to 10 scale):  7  / 10   Medication Changes/New allergies or changes in medical history, any new surgeries or procedures? NO     If yes, update Summary List   Subjective Functional Status/Changes:  []  No changes reported     \"I dont feel better than when I first started. And I am doing my exercise at home. \"   \"I have better things to do. I have been putting off traveling. \"   \"I dont walk bear foot anymore. \"          OBJECTIVE    8 minutes ionto with dexamethasone take home patch to R medial ankle    42 (22) min Therapeutic Exercise:  [x]  See flow sheet   Rationale:      increase ROM, increase strength, improve balance and increase proprioception to improve the patients ability to perform  ADLs with ease and decrease p! With amb.        min Patient Education:  YES  Reviewed HEP   []  Progressed/Changed HEP based on:   Patient reports compliance     Other Objective/Functional Measures:    Minimal cueing with exercises and stretching. Placed iontophoresis patch on RLE focused on medial ankle posterior to medial malleolus . Pt stated no adverse effect. Pt tolerated session well. Reviewed ionto precautions with patient, advised her not to get patch wet or apply ice over patch     Post Treatment Pain Level (on 0 to 10) scale:    10     ASSESSMENT    Assessment/Changes in Function:     Minimal cueing with therex. Pt tolerated session well. Iontophoresis patch given and explanted to pt. Pt verbalized understanding.       []  See Progress Note/Recertification   Patient will continue to benefit from skilled PT services to modify and progress therapeutic interventions, address functional mobility deficits, address ROM deficits, address strength deficits, analyze and address soft tissue restrictions, analyze and cue movement patterns, analyze and modify body mechanics/ergonomics and assess and modify postural abnormalities to attain remaining goals. Progress toward goals / Updated goals:    1.  Patient will be independent with HEP to self-manage and prevent symptoms upon DC. 2.  Patient will improve FOTO score to 53 points to indicate improved functional status. 3.  Patient will improve R ankle DF to 5 degrees to improve gait mechanics. 4. Patient will improve R ankle PF to 50 deg to improve toe off in gait.      PLAN    [x]  Upgrade activities as tolerated YES Continue plan of care   []  Discharge due to :    [] Other:      Therapist: Debora Zamora PT    Date: 7/28/2017 Time: 1:55 PM     Future Appointments  Date Time Provider Hilario Polanco   7/28/2017 2:00 PM Debora Zamora PT [facility]   8/1/2017 1:00 PM Debora Zamora PT [facility]   8/4/2017 2:30 PM [facility]   8/8/2017 3:00 PM [facility]   8/10/2017 3:00 PM [facility]   8/15/2017 2:30 PM [facility]   8/18/2017 2:30 PM [facility]   8/22/2017 1:30 PM [facility]   8/24/2017 1:30 PM [facility]   8/28/2017 2:00 PM [facility]   8/30/2017 1:30 PM [facility]   11/8/2017 1:15 PM Reena Clayton MD [address] [facility]

## 2017-08-01 ENCOUNTER — HOSPITAL ENCOUNTER (OUTPATIENT)
Dept: PHYSICAL THERAPY | Age: 77
Discharge: HOME | End: 2017-08-01
Payer: MEDICARE

## 2017-08-01 PROCEDURE — 97110 THERAPEUTIC EXERCISES: CPT

## 2017-08-01 NOTE — PROGRESS NOTES
PHYSICAL THERAPY - DAILY TREATMENT NOTE    Patient Name: Vivian Sandhu        Date: 2017  : 1940   YES Patient  Verified  Visit #:   12     Insurance: Payor: Geraldine Christopher / Plan: [place] MEDICARE PART A & B / Product Type: Medicare /      In time: 1:03  Out time: 1:51   Total Treatment Time: 48     Medicare Time Tracking (below)   Total Timed Codes (min):  48 1:1 Treatment Time:  48     TREATMENT AREA =  Right ankle pain [M25.571]    SUBJECTIVE    Pain Level (on 0 to 10 scale):  6  / 10   Medication Changes/New allergies or changes in medical history, any new surgeries or procedures? NO     If yes, update Summary List   Subjective Functional Status/Changes:  []  No changes reported     \"That patch helped. Its not hurting as bad and it seems like the swelling and inflammation has decreased. I have been doing my stretches and exercise at home. \"     \"I feel better after doing my exercises. \"    \"I am not limping as much\"     OBJECTIVE    Modalities Rationale: To decrease pain and inflammation       min [] Estim, type/location:                                      []  att     []  unatt     []  w/US     []  w/ice    []  w/heat    min []  Mechanical Traction: type/lbs                   []  pro   []  sup   []  int   []  cont    []  before manual    []  after manual    min []  Ultrasound, settings/location:     10 (0) min [x]  Iontophoresis w/ dexamethasone, location: R ankle posterior to medial mallieoli.                                                [x]  take home patch       []  in clinic    min []  Ice     []  Heat    location/position:     min []  Vasopneumatic Device, press/temp:     min []  Other:    [x] Skin assessment post-treatment (if applicable):   [x]  intact    []  redness- no adverse reaction     []redness  adverse reaction:     38 min Therapeutic Exercise:  [x]  See flow sheet   Rationale:      increase ROM, increase strength, improve balance and increase proprioception to improve the patients ability to perform ADLS with ease and decrease pain with amb.         min Patient Education:  YES  Reviewed HEP   []  Progressed/Changed HEP based on:   Patient reports compliance     Other Objective/Functional Measures:    Minimal cueing with exercise and stretches. Progressed F/L steps to blue steps. Incorporated SLS on foam. Iontophoresis patch placed and pt instructed about precautions and wear time. Pt verbalized. Pt tolerated session well. Post Treatment Pain Level (on 0 to 10) scale:   5  / 10     ASSESSMENT    Assessment/Changes in Function:     Minimal cueing with therex. Iontophoresis patch placed. Pt tolerated progression and overall session well.      []  See Progress Note/Recertification   Patient will continue to benefit from skilled PT services to modify and progress therapeutic interventions, address functional mobility deficits, address ROM deficits, address strength deficits, analyze and address soft tissue restrictions, analyze and cue movement patterns, analyze and modify body mechanics/ergonomics and assess and modify postural abnormalities to attain remaining goals. Progress toward goals / Updated goals:    1.  Patient will be independent with HEP to self-manage and prevent symptoms upon DC. 2.  Patient will improve FOTO score to 53 points to indicate improved functional status. 3.  Patient will improve R ankle DF to 5 degrees to improve gait mechanics.   4. Patient will improve R ankle PF to 50 deg to improve toe off in gait.           PLAN    [x]  Upgrade activities as tolerated YES Continue plan of care   []  Discharge due to :    []  Other:      Therapist: Jia Gonzalez PT    Date: 8/1/2017 Time: 1:05 PM     Future Appointments  Date Time Provider Hilario Polanco   8/4/2017 2:30 PM [address] [place] [address]   8/8/2017 3:00 PM [address] [place] [address]   8/10/2017 3:00 PM [address] [place] [address]   8/15/2017 2:30 PM [address] [place] [address]   8/18/2017 2:30 PM [facility]   8/22/2017 1:30 PM [facility]   8/24/2017 1:30 PM [facility]   8/28/2017 2:00 PM [facility]   8/30/2017 1:30 PM [facility]   11/8/2017 1:15 PM Alec Bond MD [id number] [facility]

## 2017-08-04 ENCOUNTER — APPOINTMENT (OUTPATIENT)
Dept: PHYSICAL THERAPY | Age: 77
End: 2017-08-04
Payer: MEDICARE

## 2017-08-08 ENCOUNTER — HOSPITAL ENCOUNTER (OUTPATIENT)
Dept: PHYSICAL THERAPY | Age: 77
Discharge: HOME | End: 2017-08-08
Payer: MEDICARE

## 2017-08-08 ENCOUNTER — APPOINTMENT (OUTPATIENT)
Dept: PHYSICAL THERAPY | Age: 77
End: 2017-08-08
Payer: MEDICARE

## 2017-08-08 PROCEDURE — 97110 THERAPEUTIC EXERCISES: CPT

## 2017-08-08 PROCEDURE — 97016 VASOPNEUMATIC DEVICE THERAPY: CPT

## 2017-08-08 PROCEDURE — 97140 MANUAL THERAPY 1/> REGIONS: CPT

## 2017-08-08 NOTE — PROGRESS NOTES
PT DAILY TREATMENT NOTE     Patient Name: Vivian Sandhu  Date:2017  : 1940  [x]  Patient  Verified  Payor: [place] MEDICARE / Plan: [place] MEDICARE PART A & B / Product Type: Medicare /    In time:455  Out time:551  Total Treatment Time (min): 56  Total Timed Codes (min): 46  1:1 Treatment Time (min): 46   Visit #: 3 of 4-8 after last written certification     Treatment Area: Right ankle pain [M25.571]    SUBJECTIVE  Pain Level (0-10 scale): 7  Any medication changes, allergies to medications, adverse drug reactions, diagnosis change, or new procedure performed?: [x] No    [] Yes (see summary sheet for update)  Subjective functional status/changes:   [] No changes reported  \"Its the weather and I was up on it a lot today. \"    OBJECTIVE  Modality rationale: decrease inflammation and decrease pain to improve the patients ability to improve gait tolerance   Min Type Additional Details    [] Estim: []Att   []Unatt        []TENS instruct                  []IFC  []Premod   []NMES                     []Other:  []w/US   []w/ice   []w/heat  Position:  Location:    []  Traction: [] Cervical       []Lumbar                       [] Prone          []Supine                       []Intermittent   []Continuous Lbs:  [] before manual  [] after manual    []  Ultrasound: []Continuous   [] Pulsed                           []1MHz   []3MHz Location:  W/cm2:    []  Iontophoresis with dexamethasone         Location: [] Take home patch   [] In clinic    []  Ice     []  heat  []  Ice massage Position:  Location:   10 [x]  Vasopneumatic Device Pressure:       [] lo [x] med [] hi   Temperature: [x] lo [] med [] hi   [x] Skin assessment post-treatment:  [x]intact []redness- no adverse reaction       []redness  adverse reaction:       31 min Therapeutic Exercise:  [x] See flow sheet :Initiated ther ex per flow sheet    Rationale: increase ROM, increase strength and improve flexibility  to improve the patients ability to decrease pain with amb and other ADLs and IADLs to progress to increased activity tolerance     15 min Manual Therapy:  DTM GSC with roller, manual gastroc and soleus stretch    Rationale: decrease pain, increase ROM and increase tissue extensibility to improve gait mechanics limited by [address] tightness and decrease stress of achilles on calcaneous     x min Patient Education: [x] Review HEP        Other Objective/Functional Measures:      First FU treatment at [facility] facility sec to closer to home    Patient educated on ionto coverage by [address] (not covered)   Ankle girth : L 11 R 12.25 in     Pain Level (0-10 scale) post treatment: 5    ASSESSMENT/Changes in Function: Patient tolerated updated therex well. 100% VC for form and technique for all newly introduced therex. Intiaited VASO end of treatment sec to documented heel swelling     Patient will continue to benefit from skilled PT services to modify and progress therapeutic interventions, address functional mobility deficits, address ROM deficits, address strength deficits, analyze and address soft tissue restrictions, analyze and cue movement patterns, analyze and modify body mechanics/ergonomics and assess and modify postural abnormalities to attain remaining goals. []  See Plan of Care  [x]  See progress note/recertification 4/60/10  []  See Discharge Summary         Progress towards goals / Updated goals: GOALS REVIEWED AFTER PATIENT WAS TRANSFERRED FROM ANOTHER FACILITY - CONT PER GOALS AS APPROPRIATE 8/8/17  · Goals for this certification period include and are to be achieved in  2-3  weeks:  1.  Patient will be independent with HEP to self-manage and prevent symptoms upon DC. 2.  Patient will improve FOTO score to 53 points to indicate improved functional status. 3.  Patient will improve R ankle DF to 5 degrees to improve gait mechanics. 4. Patient will improve R ankle PF to 50 deg to improve toe off in gait.     PLAN  [x]  Upgrade activities as tolerated     [] Continue plan of care  [x]  Update interventions per flow sheet       []  Discharge due to:_  [x]  Other:_assess response to first FU treatment at [facility], PT 8/8/2017

## 2017-08-10 ENCOUNTER — APPOINTMENT (OUTPATIENT)
Dept: PHYSICAL THERAPY | Age: 77
End: 2017-08-10
Payer: MEDICARE

## 2017-08-15 ENCOUNTER — HOSPITAL ENCOUNTER (OUTPATIENT)
Dept: PHYSICAL THERAPY | Age: 77
Discharge: HOME | End: 2017-08-15
Payer: MEDICARE

## 2017-08-15 ENCOUNTER — APPOINTMENT (OUTPATIENT)
Dept: PHYSICAL THERAPY | Age: 77
End: 2017-08-15
Payer: MEDICARE

## 2017-08-15 PROCEDURE — 97110 THERAPEUTIC EXERCISES: CPT

## 2017-08-15 PROCEDURE — 97016 VASOPNEUMATIC DEVICE THERAPY: CPT

## 2017-08-15 PROCEDURE — 97035 APP MDLTY 1+ULTRASOUND EA 15: CPT

## 2017-08-15 PROCEDURE — 97140 MANUAL THERAPY 1/> REGIONS: CPT

## 2017-08-15 NOTE — PROGRESS NOTES
PT DAILY TREATMENT NOTE     Patient Name: Charlotte Lock  Date:8/15/2017  : 1940  [x]  Patient  Verified  Payor: [place] MEDICARE / Plan: [place] MEDICARE PART A & B / Product Type: Medicare /    In time:100 Out time:159  Total Treatment Time (min): 59  Total Timed Codes (min): 49  1:1 Treatment Time (min): 49   Visit #: 4 of 4-8 after last written certification     Treatment Area: Right ankle pain [M25.571]    SUBJECTIVE  Pain Level (0-10 scale): 7  Any medication changes, allergies to medications, adverse drug reactions, diagnosis change, or new procedure performed?: [x] No    [] Yes (see summary sheet for update)  Subjective functional status/changes:   [] No changes reported  \"Something I did here last time flared it up. Are there any alternatives to those stretches because those are what really bothers me. \"    OBJECTIVE  Modality rationale: decrease inflammation and decrease pain to improve the patients ability to improve gait tolerance   Min Type Additional Details    [] Estim: []Att   []Unatt        []TENS instruct                  []IFC  []Premod   []NMES                     []Other:  []w/US   []w/ice   []w/heat  Position:  Location:    []  Traction: [] Cervical       []Lumbar                       [] Prone          []Supine                       []Intermittent   []Continuous Lbs:  [] before manual  [] after manual   8 [x]  Ultrasound: []Continuous   [x] Pulsed - 50%                           []1MHz   []3MHz Location: R achilles attachment    W/cm2:1.3    []  Iontophoresis with dexamethasone         Location: [] Take home patch   [] In clinic    []  Ice     []  heat  []  Ice massage Position:  Location:   10 [x]  Vasopneumatic Device Pressure:       [] lo [x] med [] hi   Temperature: [x] lo [] med [] hi   [x] Skin assessment post-treatment:  [x]intact []redness- no adverse reaction       []redness  adverse reaction:       33 min Therapeutic Exercise:  [x] See flow sheet    Rationale: increase ROM, increase strength and improve flexibility  to improve the patients ability to decrease pain with amb and other ADLs and IADLs to progress to increased activity tolerance     8 min Manual Therapy:  DTM GSC with roller, manual soleus stretch,PROM ankle DF    Rationale: decrease pain, increase ROM and increase tissue extensibility to improve gait mechanics limited by [address] tightness and decrease stress of achilles on calcaneous     x min Patient Education: [x] Review HEP - added soleus stretch , taught roller to calf        Other Objective/Functional Measures:     LTG 1 - HEP compliance : reports compliance but with increased pain    LTG 3 - ankle DF AROM 7    Pain Level (0-10 scale) post treatment: 5    ASSESSMENT/Changes in Function: Patient educated that ionto is not covered by [employer] [address]. Patient reports pain levels increased/ aggravated especially with gastroc and soleus. Held aggressive heel cord stretching sec to co pain. Patient tolerated strap gastroc and soleus better with less co pain. Patient will continue to benefit from skilled PT services to modify and progress therapeutic interventions, address functional mobility deficits, address ROM deficits, address strength deficits, analyze and address soft tissue restrictions, analyze and cue movement patterns, analyze and modify body mechanics/ergonomics and assess and modify postural abnormalities to attain remaining goals. []  See Plan of Care  [x]  See progress note/recertification 0/24/66  []  See Discharge Summary         Progress towards goals / Updated goals:   · Goals for this certification period include and are to be achieved in  2-3  weeks:  1.  Patient will be independent with HEP to self-manage and prevent symptoms upon DC. Goal reports compliance with HEP - modified today to decrease inflammation  8/15/17  2.  Patient will improve FOTO score to 53 points to indicate improved functional status.   3.  Patient will improve R ankle DF to 5 degrees to improve gait mechanics. Goal met at 7 deg  8/15/17  4. Patient will improve R ankle PF to 50 deg to improve toe off in gait.     PLAN  [x]  Upgrade activities as tolerated     []  Continue plan of care  [x]  Update interventions per flow sheet       []  Discharge due to:_  [x]  Other:assess change to HEP and addition of [address] [place], PT 8/15/2017

## 2017-08-16 ENCOUNTER — HOSPITAL ENCOUNTER (OUTPATIENT)
Dept: PHYSICAL THERAPY | Age: 77
Discharge: HOME | End: 2017-08-16
Payer: MEDICARE

## 2017-08-16 PROCEDURE — 97035 APP MDLTY 1+ULTRASOUND EA 15: CPT

## 2017-08-16 PROCEDURE — 97016 VASOPNEUMATIC DEVICE THERAPY: CPT

## 2017-08-16 PROCEDURE — 97110 THERAPEUTIC EXERCISES: CPT

## 2017-08-16 PROCEDURE — 97140 MANUAL THERAPY 1/> REGIONS: CPT

## 2017-08-16 NOTE — PROGRESS NOTES
PT DAILY TREATMENT NOTE     Patient Name: Lacho Camara  Date:2017  : 1940  [x]  Patient  Verified  Payor: Madansusan Doyle / Plan: [place] MED[place] & [place] / Product Type: Medicare /    In time: 12:02 Out time: 1:10  Total Treatment Time (min): 68  Total Timed Codes (min): 53 (-10 min vaso, -5 min bike)  1:1 Treatment Time (min): 53  Visit #: 5 of 4-8 after last written certification     Treatment Area: Right ankle pain [M25.571]    SUBJECTIVE  Pain Level (0-10 scale): 5-6  Any medication changes, allergies to medications, adverse drug reactions, diagnosis change, or new procedure performed?: [x] No    [] Yes (see summary sheet for update)  Subjective functional status/changes:   [] No changes reported  \"It felt much better after the US.  I overdid it I think at home\"    OBJECTIVE  Modality rationale: decrease inflammation and decrease pain to improve the patients ability to improve gait tolerance   Min Type Additional Details    [] Estim: []Att   []Unatt        []TENS instruct                  []IFC  []Premod   []NMES                     []Other:  []w/US   []w/ice   []w/heat  Position:  Location:    []  Traction: [] Cervical       []Lumbar                       [] Prone          []Supine                       []Intermittent   []Continuous Lbs:  [] before manual  [] after manual   8 [x]  Ultrasound: []Continuous   [x] Pulsed - 50%                           []1MHz   [x]3MHz Location: R achilles attachment    W/cm2:1.3    []  Iontophoresis with dexamethasone         Location: [] Take home patch   [] In clinic    []  Ice     []  heat  []  Ice massage Position:  Location:   10 [x]  Vasopneumatic Device Pressure:       [] lo [x] med [] hi   Temperature: [x] lo [] med [] hi   [x] Skin assessment post-treatment:  [x]intact []redness- no adverse reaction       []redness  adverse reaction:       37 min Therapeutic Exercise:  [x] See flow sheet    Rationale: increase ROM, increase strength and improve flexibility  to improve the patients ability to decrease pain with amb and other ADLs and IADLs to progress to increased activity tolerance     8 min Manual Therapy:  DTM GSC, manual soleus stretch,PROM ankle DF. Grade 3 TC joint distraction and P/A mobs. Rationale: decrease pain, increase ROM and increase tissue extensibility to improve gait mechanics limited by GSC tightness and decrease stress of achilles on calcaneous     x min Patient Education: [x] Review HEP      Other Objective/Functional Measures:    -modified SLS on foam to 75/25% wb'ing due to pt c/o incr. Pain with 100% wb'ing RLE.  -Pt ed on correct form with self gastroc/soleus stretches. Pain Level (0-10 scale) post treatment: 5    ASSESSMENT/Changes in Function: Pt demonstrates improved gait mechanics post manual/ ultrasound interventions with reduced pain c/o. Decreased edema noted to posterior calc post vaso with pt reporting further pain reduction. Pt ed to perform H/T raises for HEP 1-2 x/day to tolerance to promote tissue healing and strength gains. Patient will continue to benefit from skilled PT services to modify and progress therapeutic interventions, address functional mobility deficits, address ROM deficits, address strength deficits, analyze and address soft tissue restrictions, analyze and cue movement patterns, analyze and modify body mechanics/ergonomics and assess and modify postural abnormalities to attain remaining goals. []  See Plan of Care  [x]  See progress note/recertification 6/81/75  []  See Discharge Summary         Progress towards goals / Updated goals:    · Goals for this certification period include and are to be achieved in  2-3  weeks:  1.  Patient will be independent with HEP to self-manage and prevent symptoms upon DC. Goal reports compliance with HEP - modified today to decrease inflammation  8/15/17  2.  Patient will improve FOTO score to 53 points to indicate improved functional status.   3.  Patient will improve R ankle DF to 5 degrees to improve gait mechanics. Goal met at 7 deg  8/15/17  4. Patient will improve R ankle PF to 50 deg to improve toe off in gait. PLAN  [x]  Upgrade activities as tolerated     []  Continue plan of care  [x]  Update interventions per flow sheet       []  Discharge due to:_  [x]  Other:assess change to HEP and addition of US      Eulalia Hudson, PT 8/16/2017

## 2017-08-18 ENCOUNTER — APPOINTMENT (OUTPATIENT)
Dept: PHYSICAL THERAPY | Age: 77
End: 2017-08-18
Payer: MEDICARE

## 2017-08-22 ENCOUNTER — HOSPITAL ENCOUNTER (OUTPATIENT)
Dept: PHYSICAL THERAPY | Age: 77
Discharge: HOME | End: 2017-08-22
Payer: MEDICARE

## 2017-08-22 ENCOUNTER — APPOINTMENT (OUTPATIENT)
Dept: PHYSICAL THERAPY | Age: 77
End: 2017-08-22
Payer: MEDICARE

## 2017-08-22 PROCEDURE — 97035 APP MDLTY 1+ULTRASOUND EA 15: CPT | Performed by: PHYSICAL THERAPIST

## 2017-08-22 PROCEDURE — 97140 MANUAL THERAPY 1/> REGIONS: CPT | Performed by: PHYSICAL THERAPIST

## 2017-08-22 PROCEDURE — 97110 THERAPEUTIC EXERCISES: CPT | Performed by: PHYSICAL THERAPIST

## 2017-08-22 NOTE — PROGRESS NOTES
PT DAILY TREATMENT NOTE     Patient Name: Frank Troncoso  Date:2017  : 1940  [x]  Patient  Verified  Payor: [place] MEDICARE / Plan: [place] MEDICARE PART A & B / Product Type: Medicare /    In time: 210 Out time: 305  Total Treatment Time (min): 55  Total Timed Codes (min): 55  1:1 Treatment Time (min): 35  Visit #: 6 of 4-8 after last written certification     Treatment Area: Right ankle pain [M25.571]    SUBJECTIVE  Pain Level (0-10 scale): 7  Any medication changes, allergies to medications, adverse drug reactions, diagnosis change, or new procedure performed?: [x] No    [] Yes (see summary sheet for update)  Subjective functional status/changes:   [] No changes reported  \" Over the weekend it really bothered me quite a bit and I don't know why\"    OBJECTIVE  Modality rationale: decrease inflammation and decrease pain to improve the patients ability to improve gait tolerance   Min Type Additional Details    [] Estim: []Att   []Unatt        []TENS instruct                  []IFC  []Premod   []NMES                     []Other:  []w/US   []w/ice   []w/heat  Position:  Location:    []  Traction: [] Cervical       []Lumbar                       [] Prone          []Supine                       []Intermittent   []Continuous Lbs:  [] before manual  [] after manual   8 [x]  Ultrasound: []Continuous   [x] Pulsed - 50%                           []1MHz   [x]3MHz Location: R achilles attachment    W/cm2:1.3    []  Iontophoresis with dexamethasone         Location: [] Take home patch   [] In clinic    []  Ice     []  heat  []  Ice massage Position:  Location:   10 [x]  Vasopneumatic Device Pressure:       [] lo [x] med [] hi   Temperature: [x] lo [] med [] hi   [x] Skin assessment post-treatment:  [x]intact []redness- no adverse reaction       []redness  adverse reaction:       29/ min Therapeutic Exercise:  [x] See flow sheet    Rationale: increase ROM, increase strength and improve flexibility  to improve the patients ability to decrease pain with amb and other ADLs and IADLs to progress to increased activity tolerance     8 min Manual Therapy:  DTM GSC, manual soleus stretch,PROM ankle DF. Grade 3 TC joint distraction and P/A mobs. Rationale: decrease pain, increase ROM and increase tissue extensibility to improve gait mechanics limited by GSC tightness and decrease stress of achilles on calcaneous     x min Patient Education: [x] Review HEP      Other Objective/Functional Measures:    PF of R ankle: 63 deg, AROM DF: neutral, PROM: 7 deg. Patient inquired about Ionto as she has this treatment at [facility] and reports it helped relieve the pain. Pain Level (0-10 scale) post treatment: 6    ASSESSMENT/Changes in Function: Pt demonstrates improved gait mechanics post manual/ ultrasound interventions with reduced pain c/o. Pt ed to perform seated soleus stretch as standing exacerbates sx. Patient will continue to benefit from skilled PT services to modify and progress therapeutic interventions, address functional mobility deficits, address ROM deficits, address strength deficits, analyze and address soft tissue restrictions, analyze and cue movement patterns, analyze and modify body mechanics/ergonomics and assess and modify postural abnormalities to attain remaining goals. []  See Plan of Care  [x]  See progress note/recertification 5/48/43  []  See Discharge Summary         Progress towards goals / Updated goals:    · Goals for this certification period include and are to be achieved in  2-3  weeks:  1.  Patient will be independent with HEP to self-manage and prevent symptoms upon DC. Goal reports compliance with HEP - modified today to decrease inflammation  8/15/17  2.  Patient will improve FOTO score to 53 points to indicate improved functional status. 3.  Patient will improve R ankle DF to 5 degrees to improve gait mechanics. Goal met at 7 deg  8/15/17  4.  Patient will improve R ankle PF to 50 deg to improve toe off in gait.     PLAN  [x]  Upgrade activities as tolerated     []  Continue plan of care  [x]  Update interventions per flow sheet       []  Discharge due to:_  [x]  Other: PN Next visit    Carolyn Queen, PT 8/22/2017

## 2017-08-24 ENCOUNTER — APPOINTMENT (OUTPATIENT)
Dept: PHYSICAL THERAPY | Age: 77
End: 2017-08-24
Payer: MEDICARE

## 2017-08-24 ENCOUNTER — HOSPITAL ENCOUNTER (OUTPATIENT)
Dept: PHYSICAL THERAPY | Age: 77
Discharge: HOME | End: 2017-08-24
Payer: MEDICARE

## 2017-08-24 PROCEDURE — 97016 VASOPNEUMATIC DEVICE THERAPY: CPT

## 2017-08-24 PROCEDURE — 97035 APP MDLTY 1+ULTRASOUND EA 15: CPT

## 2017-08-24 PROCEDURE — G8978 MOBILITY CURRENT STATUS: HCPCS

## 2017-08-24 PROCEDURE — 97140 MANUAL THERAPY 1/> REGIONS: CPT

## 2017-08-24 PROCEDURE — G8979 MOBILITY GOAL STATUS: HCPCS

## 2017-08-24 PROCEDURE — 97110 THERAPEUTIC EXERCISES: CPT

## 2017-08-24 NOTE — PROGRESS NOTES
PT DAILY TREATMENT NOTE     Patient Name: Charlotte Lock  Date:2017  : 1940  [x]  Patient  Verified  Payor: [place] MEDICARE / Plan: [place] MEDICARE PART A & B / Product Type: Medicare /    In time:5:06  Out time:6:18  Total Treatment Time (min): 72  Total Timed Codes (min): 62  1:1 Treatment Time (min): 5:10 to 6:00 (50)   Visit #: 7 of 4-8 (after last written certification)    Treatment Area: Right ankle pain [M25.571]    SUBJECTIVE  Pain Level (0-10 scale): 7  Any medication changes, allergies to medications, adverse drug reactions, diagnosis change, or new procedure performed?: [x] No    [] Yes (see summary sheet for update)  Subjective functional status/changes:   [] No changes reported  Functional improvements: short distance ambulation without shoes if needed; pain fluctuates,   Deficits: walking 5 houses  Pain ranges 5-9/10, 7/10   80% improvement    Goals: walk without a limp      OBJECTIVE  Modality rationale: decrease edema, decrease inflammation and decrease pain to improve the patients ability to ambulate, perform stairs   Min Type Additional Details    [] Estim: []Att   []Unatt        []TENS instruct                  []IFC  []Premod   []NMES                     []Other:  []w/US   []w/ice   []w/heat  Position:  Location:    []  Traction: [] Cervical       []Lumbar                       [] Prone          []Supine                       []Intermittent   []Continuous Lbs:  [] before manual  [] after manual   8 [x]  Ultrasound: []Continuous   [x] Pulsed                           []1MHz   [x]3MHz Location: R achilles insertion   W/cm2: 1.3    []  Iontophoresis with dexamethasone         Location: [] Take home patch   [] In clinic    []  Ice     []  heat  []  Ice massage Position:  Location:   10 [x]  Vasopneumatic Device Pressure:       [] lo [x] med [] hi   Temperature: [x] lo [] med [] hi   [x] Skin assessment post-treatment:  [x]intact []redness- no adverse reaction       []redness  adverse reaction:       42 min Therapeutic Exercise:  [x] See flow sheet :    Rationale: increase ROM, increase strength and improve coordination to improve the patients ability to improve gait, ADLs, IADL's    12 min Manual Therapy:  DTM GSC, manual soleus stretch,PROM ankle DF. Grade 3 TC joint distraction and P/A mobs. First ray PF mobs    Rationale: decrease pain, increase ROM, increase tissue extensibility and decrease trigger points to improve mobility          x min Patient Education: [x] Review HEP    [x] Progressed/Changed HEP based on: reviewed form with seated gastroc and soleus stretches    [] positioning   [] body mechanics   [] transfers   [] heat/ice application       -Elevation and AROM of the L ankle at home to improve venostasis     IONTO IS NOT COVERED BY MEDICARE AT THIS TIME. Pt REQUESTS IONTO BUT INFORMED HER OF THE INSURANCE REGULATIONS      Other Objective/Functional Measures:       AROM                                                       Strength (1-5)     Left (IE) Right Left  Right   Dorsiflexion 6 7 4+ 4+   Plantarflexion 54 61 4+ 4+   Eversion 14 9 4 4   Inversion 27 28 4 4   Great Toe Ext 65 67         DF with knee flexion 11 10           FOTO (visit 17): 47/100  Callus on the plantar aspect of the first MTP joint of the R foot. Decreased navicular drop R > L. Extra bone on dorsal first ray? ?  Min + dural tension on the R seated slump       Figure 8: L 54, R 52cm      Pain Level (0-10 scale) post treatment: 5    ASSESSMENT/Changes in Function: see pN.  Pt ambulates with non-antalgic gait, less pain after Rx     Patient will continue to benefit from skilled PT services to modify and progress therapeutic interventions, address functional mobility deficits, address ROM deficits, address strength deficits, analyze and address soft tissue restrictions, analyze and cue movement patterns, analyze and modify body mechanics/ergonomics, assess and modify postural abnormalities, address imbalance/dizziness and instruct in home and community integration to attain remaining goals. []  See Plan of Care  []  See progress note/recertification  []  See Discharge Summary         Progress towards goals / Updated goals:  · Goals for this certification period include and are to be achieved in  2-3  weeks:  1.  Patient will be independent with HEP to self-manage and prevent symptoms upon DC. Goal reports compliance with HEP - modified today to decrease inflammation  8/15/17; elevation and AROM L ankle to assist with edema (8/24/17)  2.  Patient will improve FOTO score to 53 points to indicate improved functional status. Progressing from 40/100 at IE to 47/100 (8/24/17)  3.  Patient will improve R ankle DF to 5 degrees to improve gait mechanics. Goal met at 7 deg  8//24/17  4. Patient will improve R ankle PF to 50 deg to improve toe off in gait.  Goal met at 61 degrees PF (8/24/17)    PLAN  [x]  Upgrade activities as tolerated     []  Continue plan of care  []  Update interventions per flow sheet       []  Discharge due to:_  [x]  Other: con't 2x/week for 8-10 sessions_      Joao Horne, PT 8/24/2017  12:54 PM

## 2017-08-24 NOTE — PROGRESS NOTES
[address] [facility] [address] [facility] AT [address]  Phone: [phone] Fax: [phone]  PROGRESS NOTE  Patient Name: Evy Cortes : 1940   Treatment/Medical Diagnosis: Right ankle pain [M25.571]   Referral Source: Oscar Ragsdale MD     Date of Initial Visit: 17 Attended Visits: 17 Missed Visits: 0     SUMMARY OF TREATMENT   Evy Cortes is a 78 y.o.  female with Dx: Right ankle pain [M25.571], signs and symptoms consistent with R achilles tendonitis. Ther ex including strengthening, ROM, flexibility, stabilization; manual therapy including: joint mobs, DTM, TrP release, stretching; Patient education; HEP, mobility training, ultrasound, iontophoresis (no longer covered by medicare); vasopneumatic device for edema control   CURRENT STATUS  Pt is making fair progress in therapy. Pain ranges from 5-9/10 ave 7/10, and pt rates 80% improvement. Score on the FOTO has improved from 40/100 at IE to 47/100 demo' ing improvements in functional activity tolerance. Functional improvements: short distance ambulation without shoes if needed; pain fluctuates (no longer constant)   Deficits: walking 5 houses, fluctuating edema at the AT junction   Updated pt Goals: walk without a limp  Figure 8: L 54, R 52cm                                                                                            AROM                                                       Strength (1-5)     Left (IE) Right Left  Right   Dorsiflexion 6 7 4+ 4+   Plantarflexion 54 61 4+ 4+   Eversion 14 9 4 4   Inversion 27 28 4 4   Great Toe Ext 65 67         DF with knee flexion 11 10           Goals for this certification period include:  · Goals for this certification period include and are to be achieved in  2-3  weeks:  1.  Patient will be independent with HEP to self-manage and prevent symptoms upon DC.  Goal reports compliance with HEP - modified today to decrease inflammation  8/15/17; elevation and AROM L ankle to assist with edema (8/24/17)  2.  Patient will improve FOTO score to 53 points to indicate improved functional status. Progressing from 40/100 at IE to 47/100 (8/24/17)  3.  Patient will improve R ankle DF to 5 degrees to improve gait mechanics. Goal met at 7 deg  8//24/17  4. Patient will improve R ankle PF to 50 deg to improve toe off in gait. Goal met at 61 degrees PF (8/24/17)      New Goals to be achieved in __8-10__  treatments:  1. Patient will improve FOTO score to 53 points to indicate improved functional status   2. Patient will be independent with HEP to self-manage and prevent symptoms upon DC. 3.  Pt will have a decrease in ave pain of R ankle to < or = 5/10 to improve gait    4. Pt will be able to ambulate the distance of 5 house in her neighborhood without an increase in pain or instability to return to her PLOF      G-Codes: Mobility  Current  CK= 40-59%   Goal  CK= 40-59%. The severity rating is based on the FOTO Score  RECOMMENDATIONS  Recommend Con't iwht PT 2x/week for 8-10 sessions to address aforementioned goals. If you have any questions/comments please contact us directly at [phone]. Thank you for allowing us to assist in the care of your patient. Therapist Signature: Gricel Andujar DPT Date: 8/24/2017   Reporting period:  Certification pd 7/68/53 to 8/24/17 6/22/17 to 9/21/17 Time: 12:59 PM   NOTE TO PHYSICIAN:  PLEASE COMPLETE THE ORDERS BELOW AND FAX TO   [facility]: [phone].   If you are unable to process this request in 24 hours please contact our office: [phone].  ___ I have read the above report and request that my patient continue as recommended.   ___ I have read the above report and request that my patient continue therapy with the following changes/special instructions:_________________________________________________________   ___ I have read the above report and request that my patient be discharged from therapy.      Physician Signature:        Date:       Time:

## 2017-08-28 ENCOUNTER — HOSPITAL ENCOUNTER (OUTPATIENT)
Dept: PHYSICAL THERAPY | Age: 77
Discharge: HOME | End: 2017-08-28
Payer: MEDICARE

## 2017-08-28 ENCOUNTER — APPOINTMENT (OUTPATIENT)
Dept: PHYSICAL THERAPY | Age: 77
End: 2017-08-28
Payer: MEDICARE

## 2017-08-28 PROCEDURE — 97035 APP MDLTY 1+ULTRASOUND EA 15: CPT

## 2017-08-28 PROCEDURE — 97110 THERAPEUTIC EXERCISES: CPT

## 2017-08-28 PROCEDURE — 97016 VASOPNEUMATIC DEVICE THERAPY: CPT

## 2017-08-28 PROCEDURE — 97140 MANUAL THERAPY 1/> REGIONS: CPT

## 2017-08-28 NOTE — PROGRESS NOTES
PT DAILY TREATMENT NOTE     Patient Name: Scooter Ruiz  Date:2017  : 1940  [x]  Patient  Verified  Payor: [place] MEDICARE / Plan: [place] MEDICARE PART A & B / Product Type: Medicare /    In time:1202  Out time:106  Total Treatment Time (min): 64  Total Timed Codes (min): 54  1:1 Treatment Time (min): 54  Visit #: 1 of 8-10    Treatment Area: Right ankle pain [M25.571]    SUBJECTIVE  Pain Level (0-10 scale): 6  Any medication changes, allergies to medications, adverse drug reactions, diagnosis change, or new procedure performed?: [x] No    [] Yes (see summary sheet for update)  Subjective functional status/changes:   [] No changes reported  \"I have been doing my exercises with my legs elevated. That is hard. \"    OBJECTIVE  Modality rationale: decrease edema, decrease inflammation and decrease pain to improve the patients ability to ambulate, perform stairs   Min Type Additional Details    [] Estim: []Att   []Unatt        []TENS instruct                  []IFC  []Premod   []NMES                     []Other:  []w/US   []w/ice   []w/heat  Position:  Location:    []  Traction: [] Cervical       []Lumbar                       [] Prone          []Supine                       []Intermittent   []Continuous Lbs:  [] before manual  [] after manual   8 [x]  Ultrasound: []Continuous   [x] Pulsed                           []1MHz   [x]3MHz Location: R achilles insertion   W/cm2: 1.3    []  Iontophoresis with dexamethasone         Location: [] Take home patch   [] In clinic    []  Ice     []  heat  []  Ice massage Position:  Location:   10 [x]  Vasopneumatic Device with LE elevated  Pressure:       [] lo [x] med [] hi   Temperature: [x] lo [] med [] hi   [x] Skin assessment post-treatment:  [x]intact []redness- no adverse reaction       []redness  adverse reaction:       36 min Therapeutic Exercise:  [x] See flow sheet :    Rationale: increase ROM, increase strength and improve coordination to improve the patients ability to improve gait, ADLs, IADL's    10 min Manual Therapy: roller to [address], GT extension PROM, first ray PF/DF mobilization    Rationale: decrease pain, increase ROM, increase tissue extensibility and decrease trigger points to improve mobility          x min Patient Education: [x] Review HEP       Other Objective/Functional Measures:    SR : 30 sec B with increased sway    Decreased redness noted of area of inflammation    Pain Level (0-10 scale) post treatment: 5    ASSESSMENT/Changes in Function: Patient able to demo improved gait pattern and good heel toe training with step throughs - no significant compensatory patterns noted. Good balance with SR with mild sway. Patient has fair GT extension and good first ray mobilization. Patient will continue to benefit from skilled PT services to modify and progress therapeutic interventions, address functional mobility deficits, address ROM deficits, address strength deficits, analyze and address soft tissue restrictions, analyze and cue movement patterns, analyze and modify body mechanics/ergonomics, assess and modify postural abnormalities, address imbalance/dizziness and instruct in home and community integration to attain remaining goals. []  See Plan of Care  [x]  See progress note/recertification 1/63/31  []  See Discharge Summary         Progress towards goals / Updated goals: PN complete last session - cont per updated goals 8/28/17  New Goals to be achieved in __8-10__  treatments:   1. Patient will improve FOTO score to 53 points to indicate improved functional status   2. Patient will be independent with HEP to self-manage and prevent symptoms upon DC. 3.  Pt will have a decrease in ave pain of R ankle to < or = 5/10 to improve gait    4.   Pt will be able to ambulate the distance of 5 house in her neighborhood without an increase in pain or instability to return to her PLOF        PLAN  [x]  Upgrade activities as tolerated     [] Continue plan of care  []  Update interventions per flow sheet       []  Discharge due to:_  [x]  Other: patient to schedule more apts - only one more scheduled     Derrek Almazan, PT 8/28/2017

## 2017-08-30 ENCOUNTER — APPOINTMENT (OUTPATIENT)
Dept: PHYSICAL THERAPY | Age: 77
End: 2017-08-30
Payer: MEDICARE

## 2017-08-31 ENCOUNTER — HOSPITAL ENCOUNTER (OUTPATIENT)
Dept: PHYSICAL THERAPY | Age: 77
Discharge: HOME | End: 2017-08-31
Payer: MEDICARE

## 2017-08-31 PROCEDURE — 97140 MANUAL THERAPY 1/> REGIONS: CPT

## 2017-08-31 PROCEDURE — 97016 VASOPNEUMATIC DEVICE THERAPY: CPT

## 2017-08-31 PROCEDURE — 97110 THERAPEUTIC EXERCISES: CPT

## 2017-08-31 PROCEDURE — 97035 APP MDLTY 1+ULTRASOUND EA 15: CPT

## 2017-08-31 NOTE — PROGRESS NOTES
PT DAILY TREATMENT NOTE     Patient Name: Bhavna Rodarte  Date:2017  : 1940  [x]  Patient  Verified  Payor: [place] MED[place] / Plan: [place] MED[place]ARE PART A & B / Product Type: Medicare /    In time:232 Out time:327  Total Treatment Time (min): 55  Total Timed Codes (min): 45  1:1 Treatment Time (min): 239 -317 (38)  Visit #: 2 of 8-10    Treatment Area: Right ankle pain [M25.571]    SUBJECTIVE  Pain Level (0-10 scale): 5  Any medication changes, allergies to medications, adverse drug reactions, diagnosis change, or new procedure performed?: [x] No    [] Yes (see summary sheet for update)  Subjective functional status/changes:   [] No changes reported  \"It hurts just a wee bit. \"    OBJECTIVE  Modality rationale: decrease edema, decrease inflammation and decrease pain to improve the patients ability to ambulate, perform stairs   Min Type Additional Details    [] Estim: []Att   []Unatt        []TENS instruct                  []IFC  []Premod   []NMES                     []Other:  []w/US   []w/ice   []w/heat  Position:  Location:    []  Traction: [] Cervical       []Lumbar                       [] Prone          []Supine                       []Intermittent   []Continuous Lbs:  [] before manual  [] after manual   8 [x]  Ultrasound: []Continuous   [x] Pulsed                           []1MHz   [x]3MHz Location: R achilles insertion   W/cm2: 1.3    []  Iontophoresis with dexamethasone         Location: [] Take home patch   [] In clinic    []  Ice     []  heat  []  Ice massage Position:  Location:   10 [x]  Vasopneumatic Device with LE elevated  Pressure:       [] lo [x] med [] hi   Temperature: [x] lo [] med [] hi   [x] Skin assessment post-treatment:  [x]intact []redness- no adverse reaction       []redness  adverse reaction:       29 min Therapeutic Exercise:  [x] See flow sheet :    Rationale: increase ROM, increase strength and improve coordination to improve the patients ability to improve gait, ADLs, IADL's    8 min Manual Therapy: roller to [address]   Rationale: decrease pain, increase ROM, increase tissue extensibility and decrease trigger points to improve mobility          x min Patient Education: [x] Review HEP - added SR to HEP with picture        Other Objective/Functional Measures:    LTG 2 assessed    Figure 8 L 43 cm, R 45 cm   % improvement:  50%    Pain Level (0-10 scale) post treatment: 0    ASSESSMENT/Changes in Function: Patient reports \"wee bit\" of pain but give 5/10 pain on scale despite verbal cueing on pain scale. Significant improvement in foot swelling , but cont vaso for 1 cm difference. Patient progressing well with current protocol. Patient will continue to benefit from skilled PT services to modify and progress therapeutic interventions, address functional mobility deficits, address ROM deficits, address strength deficits, analyze and address soft tissue restrictions, analyze and cue movement patterns, analyze and modify body mechanics/ergonomics, assess and modify postural abnormalities, address imbalance/dizziness and instruct in home and community integration to attain remaining goals. []  See Plan of Care  [x]  See progress note/recertification 9/18/71  []  See Discharge Summary         Progress towards goals / Updated goals:   New Goals to be achieved in __8-10__  treatments:   1. Patient will improve FOTO score to 53 points to indicate improved functional status   2. Patient will be independent with HEP to self-manage and prevent symptoms upon DC.  - goal progressing - patient reports good compliance with current program, progressed by adding SR today  8/31/17   3. Pt will have a decrease in ave pain of R ankle to < or = 5/10 to improve gait    4.   Pt will be able to ambulate the distance of 5 house in her neighborhood without an increase in pain or instability to return to her PLOF        PLAN  [x]  Upgrade activities as tolerated     []  Continue plan of care  []  Update interventions per flow sheet       []  Discharge due to:_  [x]  Other: cont to progress balance challenges    Lacey Starr, PT 8/31/2017

## 2017-09-12 ENCOUNTER — HOSPITAL ENCOUNTER (OUTPATIENT)
Dept: PHYSICAL THERAPY | Age: 77
Discharge: HOME | End: 2017-09-12
Payer: MEDICARE

## 2017-09-12 PROCEDURE — 97016 VASOPNEUMATIC DEVICE THERAPY: CPT | Performed by: PHYSICAL THERAPIST

## 2017-09-12 PROCEDURE — 97035 APP MDLTY 1+ULTRASOUND EA 15: CPT | Performed by: PHYSICAL THERAPIST

## 2017-09-12 PROCEDURE — 97140 MANUAL THERAPY 1/> REGIONS: CPT | Performed by: PHYSICAL THERAPIST

## 2017-09-12 NOTE — PROGRESS NOTES
PT DAILY TREATMENT NOTE     Patient Name: Dariela Bhatia  Date:2017  : 1940  [x]  Patient  Verified  Payor: [place] MEDICARE / Plan: [place] MEDICARE PART A & B / Product Type: Medicare /    In time: 12 Out time: 500pm  Total Treatment Time (min): 55  Total Timed Codes (min): 55  1:1 Treatment Time (min): 20  Visit #: 3 of 8-10    Treatment Area: Right ankle pain [M25.571]    SUBJECTIVE  Pain Level (0-10 scale): 6  Any medication changes, allergies to medications, adverse drug reactions, diagnosis change, or new procedure performed?: [x] No    [] Yes (see summary sheet for update)  Subjective functional status/changes:   [] No changes reported  \"I did a lot of walking this weekend because we went away to a game, so Its kind of swollen\"    OBJECTIVE  Modality rationale: decrease edema, decrease inflammation and decrease pain to improve the patients ability to ambulate, perform stairs   Min Type Additional Details    [] Estim: []Att   []Unatt        []TENS instruct                  []IFC  []Premod   []NMES                     []Other:  []w/US   []w/ice   []w/heat  Position:  Location:    []  Traction: [] Cervical       []Lumbar                       [] Prone          []Supine                       []Intermittent   []Continuous Lbs:  [] before manual  [] after manual   8 [x]  Ultrasound: []Continuous   [x] Pulsed                           []1MHz   [x]3MHz Location: R achilles insertion   W/cm2: 1.3    []  Iontophoresis with dexamethasone         Location: [] Take home patch   [] In clinic    []  Ice     []  heat  []  Ice massage Position:  Location:   10 [x]  Vasopneumatic Device with LE elevated  Pressure:       [] lo [x] med [] hi   Temperature: [x] lo [] med [] hi   [x] Skin assessment post-treatment:  [x]intact []redness- no adverse reaction       []redness  adverse reaction:       27/2 min Therapeutic Exercise:  [x] See flow sheet :    Rationale: increase ROM, increase strength and improve coordination to improve the patients ability to improve gait, ADLs, IADL's    10 min Manual Therapy: roller to [facility]   Rationale: decrease pain, increase ROM, increase tissue extensibility and decrease trigger points to improve mobility          x min Patient Education: [x] Review HEP - added SR to HEP with picture        Other Objective/Functional Measures:    patient reports she was away all weekend and did a lot of walking, so increased swelling noted in R heel at achilles, significant bump noted on Achilles Tendon with mild TTP to lateral aspect,   Pain Level (0-10 scale) post treatment: 5    ASSESSMENT/Changes in Function:    Patient progressing well with current protocol and is able to walk with normalized gait pattern and no limp today. She reports she used a cane while away on her trip to decrease stress to achilles as she was walking quite a bit. Patient will continue to benefit from skilled PT services to modify and progress therapeutic interventions, address functional mobility deficits, address ROM deficits, address strength deficits, analyze and address soft tissue restrictions, analyze and cue movement patterns, analyze and modify body mechanics/ergonomics, assess and modify postural abnormalities, address imbalance/dizziness and instruct in home and community integration to attain remaining goals. []  See Plan of Care  [x]  See progress note/recertification 4/23/40  []  See Discharge Summary         Progress towards goals / Updated goals:   New Goals to be achieved in __8-10__  treatments:   1. Patient will improve FOTO score to 53 points to indicate improved functional status   2. Patient will be independent with HEP to self-manage and prevent symptoms upon DC.  - goal progressing - patient reports good compliance with current program, progressed by adding SR today  8/31/17   3. Pt will have a decrease in ave pain of R ankle to < or = 5/10 to improve gait    4.   Pt will be able to ambulate the distance of 5 house in her neighborhood without an increase in pain or instability to return to her PLOF        PLAN  [x]  Upgrade activities as tolerated     []  Continue plan of care  []  Update interventions per flow sheet       []  Discharge due to:_  [x]  Other: cont to progress balance challenges    Yonis Alas, PT 9/12/2017

## 2017-09-14 ENCOUNTER — HOSPITAL ENCOUNTER (OUTPATIENT)
Dept: PHYSICAL THERAPY | Age: 77
Discharge: HOME | End: 2017-09-14
Payer: MEDICARE

## 2017-09-14 PROCEDURE — 97110 THERAPEUTIC EXERCISES: CPT

## 2017-09-14 PROCEDURE — 97140 MANUAL THERAPY 1/> REGIONS: CPT

## 2017-09-14 PROCEDURE — 97016 VASOPNEUMATIC DEVICE THERAPY: CPT

## 2017-09-14 PROCEDURE — 97035 APP MDLTY 1+ULTRASOUND EA 15: CPT

## 2017-09-14 NOTE — PROGRESS NOTES
PT DAILY TREATMENT NOTE     Patient Name: Gloria Hughes  Date:2017  : 1940  [x]  Patient  Verified  Payor: Vani Crump / Plan: [place] MEDICARE PART A & B / Product Type: Medicare /    In time: 331 Out time: 437  Total Treatment Time (min): 66  Total Timed Codes (min): 56  1:1 Treatment Time (min): 336 - 415 (39)  Visit #: 4 of 8-10    Treatment Area: Right ankle pain [M25.571]    SUBJECTIVE  Pain Level (0-10 scale): 5  Any medication changes, allergies to medications, adverse drug reactions, diagnosis change, or new procedure performed?: [x] No    [] Yes (see summary sheet for update)  Subjective functional status/changes:   [] No changes reported  \"I have swelling that I cant get rid of.\"    OBJECTIVE  Modality rationale: decrease edema, decrease inflammation and decrease pain to improve the patients ability to ambulate, perform stairs   Min Type Additional Details    [] Estim: []Att   []Unatt        []TENS instruct                  []IFC  []Premod   []NMES                     []Other:  []w/US   []w/ice   []w/heat  Position:  Location:    []  Traction: [] Cervical       []Lumbar                       [] Prone          []Supine                       []Intermittent   []Continuous Lbs:  [] before manual  [] after manual   8 [x]  Ultrasound: []Continuous   [x] Pulsed                           []1MHz   [x]3MHz Location: R achilles insertion   W/cm2: 1.3    []  Iontophoresis with dexamethasone         Location: [] Take home patch   [] In clinic    []  Ice     []  heat  []  Ice massage Position:  Location:   10 [x]  Vasopneumatic Device with LE elevated  Pressure:       [] lo [x] med [] hi   Temperature: [x] lo [] med [] hi   [x] Skin assessment post-treatment:  [x]intact []redness- no adverse reaction       []redness  adverse reaction:       39 min Therapeutic Exercise:  [x] See flow sheet :    Rationale: increase ROM, increase strength and improve coordination to improve the patients ability to improve gait, ADLs, IADL's    9 min Manual Therapy: roller to [address]   Rationale: decrease pain, increase ROM, increase tissue extensibility and decrease trigger points to improve mobility          x min Patient Education: [x] Review HEP         Other Objective/Functional Measures:    LTG 3 - pain at worst 4/10 per pain scale     0/10 at best - after exercise and ice , patient also uses TENs 2x daily    Pain Level (0-10 scale) post treatment: 5    ASSESSMENT/Changes in Function:    Patient is able to report decrease pain with face scale compared to verbal scale. Patient feels she has increase in swelling than prior to her weekend trip of walking. Swelling measuring at 2 cm difference around area of protuberance. Still significant but no increase since last measured. Recommend use of compression stocking to decrease swelling. Patient will continue to benefit from skilled PT services to modify and progress therapeutic interventions, address functional mobility deficits, address ROM deficits, address strength deficits, analyze and address soft tissue restrictions, analyze and cue movement patterns, analyze and modify body mechanics/ergonomics, assess and modify postural abnormalities, address imbalance/dizziness and instruct in home and community integration to attain remaining goals. []  See Plan of Care  [x]  See progress note/recertification 8/30/99  []  See Discharge Summary         Progress towards goals / Updated goals:   New Goals to be achieved in __8-10__  treatments:   1. Patient will improve FOTO score to 53 points to indicate improved functional status   2. Patient will be independent with HEP to self-manage and prevent symptoms upon DC.  - goal progressing - patient reports good compliance with current program, progressed by adding SR today  8/31/17   3. Pt will have a decrease in ave pain of R ankle to < or = 5/10 to improve gait  Goal met at 4/10 per face scale 9/14/17   4.   Pt will be able to ambulate the distance of 5 house in her neighborhood without an increase in pain or instability to return to her PLOF        PLAN  [x]  Upgrade activities as tolerated     []  Continue plan of care  []  Update interventions per flow sheet       []  Discharge due to:_  [x]  Other: cont scheduled visits then likely [address], PT 9/14/2017

## 2017-09-19 ENCOUNTER — HOSPITAL ENCOUNTER (OUTPATIENT)
Dept: PHYSICAL THERAPY | Age: 77
Discharge: HOME | End: 2017-09-19
Payer: MEDICARE

## 2017-09-19 PROCEDURE — 97140 MANUAL THERAPY 1/> REGIONS: CPT | Performed by: PHYSICAL THERAPIST

## 2017-09-19 PROCEDURE — 97110 THERAPEUTIC EXERCISES: CPT | Performed by: PHYSICAL THERAPIST

## 2017-09-19 PROCEDURE — 97016 VASOPNEUMATIC DEVICE THERAPY: CPT | Performed by: PHYSICAL THERAPIST

## 2017-09-19 NOTE — PROGRESS NOTES
PT DAILY TREATMENT NOTE     Patient Name: Tari Meyer  Date:2017  : 1940  [x]  Patient  Verified  Payor: Chrissy Anglin / Plan: [place] MEDICARE PART A & B / Product Type: Medicare /    In time: 231 pm  Out time: 351  Total Treatment Time (min): 70  Total Timed Codes (min): 65  1:1 Treatment Time (min): 55   Visit #: 5 of 8-10    Treatment Area: Right ankle pain [M25.571]    SUBJECTIVE  Pain Level (0-10 scale): 6  Any medication changes, allergies to medications, adverse drug reactions, diagnosis change, or new procedure performed?: [x] No    [] Yes (see summary sheet for update)  Subjective functional status/changes:   [] No changes reported  \"It is just still so swollen. The swelling was a little lower this morning than yesterday \"    OBJECTIVE  Modality rationale: decrease edema, decrease inflammation and decrease pain to improve the patients ability to ambulate, perform stairs   Min Type Additional Details    [] Estim: []Att   []Unatt        []TENS instruct                  []IFC  []Premod   []NMES                     []Other:  []w/US   []w/ice   []w/heat  Position:  Location:    []  Traction: [] Cervical       []Lumbar                       [] Prone          []Supine                       []Intermittent   []Continuous Lbs:  [] before manual  [] after manual   H!  [x]  Ultrasound: []Continuous   [x] Pulsed                           []1MHz   [x]3MHz Location: R achilles insertion   W/cm2: 1.3    []  Iontophoresis with dexamethasone         Location: [] Take home patch   [] In clinic    []  Ice     []  heat  []  Ice massage Position:  Location:   15 [x]  Vasopneumatic Device with LE elevated  Pressure:       [] lo [] med [x] hi   Temperature: [x] lo [] med [] hi   [x] Skin assessment post-treatment:  [x]intact []redness- no adverse reaction       []redness  adverse reaction:       45 min Therapeutic Exercise:  [x] See flow sheet :    Rationale: increase ROM, increase strength and improve coordination to improve the patients ability to improve gait, ADLs, IADL's    10 min Manual Therapy: roller to [facility]   Rationale: decrease pain, increase ROM, increase tissue extensibility and decrease trigger points to improve mobility          x min Patient Education: [x] Review HEP         Other Objective/Functional Measures:    Decreased SLS noted with increased ankle instability so Added TB inversion/eversion for ankle stability, discontinued US to assess effects. , rolling to R gastroc for MF lengthening    Pain Level (0-10 scale) post treatment: 5    ASSESSMENT/Changes in Function:   Patient tolerated new therex for ankle strengthening well, and was given green TB for HEP for ankle inversion eversion. . Continues with protuberance on R achilles, which has decreased slightly since last treatment. Patient will continue to benefit from skilled PT services to modify and progress therapeutic interventions, address functional mobility deficits, address ROM deficits, address strength deficits, analyze and address soft tissue restrictions, analyze and cue movement patterns, analyze and modify body mechanics/ergonomics, assess and modify postural abnormalities, address imbalance/dizziness and instruct in home and community integration to attain remaining goals. []  See Plan of Care  [x]  See progress note/recertification 3/67/50  []  See Discharge Summary         Progress towards goals / Updated goals:   New Goals to be achieved in __8-10__  treatments:   1. Patient will improve FOTO score to 53 points to indicate improved functional status   2. Patient will be independent with HEP to self-manage and prevent symptoms upon DC.  - goal progressing - patient reports good compliance with current program, progressed by adding SR today  8/31/17   3. Pt will have a decrease in ave pain of R ankle to < or = 5/10 to improve gait  Goal met at 4/10 per face scale 9/14/17    4.   Pt will be able to ambulate the distance of 5 house in her neighborhood without an increase in pain or instability to return to her PLOF  Progressing as patient continues to report pain and instability.  9-19-17       PLAN  [x]  Upgrade activities as tolerated     []  Continue plan of care  []  Update interventions per flow sheet       []  Discharge due to:_  []  Other:    Yonis Alas, PT 9/19/2017

## 2017-09-21 ENCOUNTER — HOSPITAL ENCOUNTER (OUTPATIENT)
Dept: PHYSICAL THERAPY | Age: 77
Discharge: HOME | End: 2017-09-21
Payer: MEDICARE

## 2017-09-21 PROCEDURE — 97140 MANUAL THERAPY 1/> REGIONS: CPT

## 2017-09-21 PROCEDURE — 97110 THERAPEUTIC EXERCISES: CPT

## 2017-09-21 PROCEDURE — 97016 VASOPNEUMATIC DEVICE THERAPY: CPT

## 2017-09-21 NOTE — PROGRESS NOTES
PT DAILY TREATMENT NOTE     Patient Name: Mendel   Date:2017  : 1940  [x]  Patient  Verified  Payor: Eve Staff / Plan: [place] MEDICARE PART A & B / Product Type: Medicare /    In time:233  Out time:326  Total Treatment Time (min): 53  Total Timed Codes (min): 43  1:1 Treatment Time (min): 43  Visit #: 6 of 8-10    Treatment Area: Right ankle pain [M25.571]    SUBJECTIVE  Pain Level (0-10 scale): 5  Any medication changes, allergies to medications, adverse drug reactions, diagnosis change, or new procedure performed?: [x] No    [] Yes (see summary sheet for update)  Subjective functional status/changes:   [] No changes reported  \"I think I am walking better. I have been taking the ibuprofen for the past few days  Because I just think its inflamed.'    OBJECTIVE  Modality rationale: decrease edema, decrease inflammation and decrease pain to improve the patients ability to ambulate, perform stairs   Min Type Additional Details    [] Estim: []Att   []Unatt        []TENS instruct                  []IFC  []Premod   []NMES                     []Other:  []w/US   []w/ice   []w/heat  Position:  Location:    []  Traction: [] Cervical       []Lumbar                       [] Prone          []Supine                       []Intermittent   []Continuous Lbs:  [] before manual  [] after manual   H!  [x]  Ultrasound: []Continuous   [x] Pulsed                           []1MHz   [x]3MHz Location: R achilles insertion   W/cm2: 1.3    []  Iontophoresis with dexamethasone         Location: [] Take home patch   [] In clinic    []  Ice     []  heat  []  Ice massage Position:  Location:   15 [x]  Vasopneumatic Device with LE elevated  Pressure:       [] lo [] med [x] hi   Temperature: [x] lo [] med [] hi   [x] Skin assessment post-treatment:  [x]intact []redness- no adverse reaction       []redness  adverse reaction:       34 min Therapeutic Exercise:  [x] See flow sheet :    Rationale: increase ROM, increase strength and improve coordination to improve the patients ability to improve gait, ADLs, IADL's    9 min Manual Therapy: roller to [address], manual DF mobs grade 3-4    Rationale: decrease pain, increase ROM, increase tissue extensibility and decrease trigger points to improve mobility          x min Patient Education: [x] Review HEP  - gave pic of TB IV/EV        Other Objective/Functional Measures:    SR 30 sec B    SLS 10-15 sec without UE support intermittently     Pain Level (0-10 scale) post treatment: 4    ASSESSMENT/Changes in Function:   Patient educated on upcoming reassessment for DC after 2 more scheduled visits. Add toe ABD for intrinsic strength. Patient will continue to benefit from skilled PT services to modify and progress therapeutic interventions, address functional mobility deficits, address ROM deficits, address strength deficits, analyze and address soft tissue restrictions, analyze and cue movement patterns, analyze and modify body mechanics/ergonomics, assess and modify postural abnormalities, address imbalance/dizziness and instruct in home and community integration to attain remaining goals. []  See Plan of Care  [x]  See progress note/recertification 2/72/83  []  See Discharge Summary         Progress towards goals / Updated goals:  WILL FORMALLY REASSESS GOALS [place] FOR 30 DAY PN 9/21/17  New Goals to be achieved in __8-10__  treatments:   1. Patient will improve FOTO score to 53 points to indicate improved functional status   2. Patient will be independent with HEP to self-manage and prevent symptoms upon DC.  - goal progressing - patient reports good compliance with current program, progressed by adding SR today  8/31/17   3. Pt will have a decrease in ave pain of R ankle to < or = 5/10 to improve gait  Goal met at 4/10 per face scale 9/14/17    4.   Pt will be able to ambulate the distance of 5 house in her neighborhood without an increase in pain or instability to return to her PLOF  Progressing as patient continues to report pain and instability.  9-19-17       PLAN  [x]  Upgrade activities as tolerated     []  Continue plan of care  []  Update interventions per flow sheet       []  Discharge due to:_  [x]  Other: PN due [place] for last scheduled apt     Shannon Hunt, PT 9/21/2017

## 2017-09-25 ENCOUNTER — HOSPITAL ENCOUNTER (OUTPATIENT)
Dept: PHYSICAL THERAPY | Age: 77
Discharge: HOME | End: 2017-09-25
Payer: MEDICARE

## 2017-09-25 PROCEDURE — 97016 VASOPNEUMATIC DEVICE THERAPY: CPT

## 2017-09-25 PROCEDURE — 97110 THERAPEUTIC EXERCISES: CPT

## 2017-09-25 PROCEDURE — 97140 MANUAL THERAPY 1/> REGIONS: CPT

## 2017-09-25 NOTE — PROGRESS NOTES
[address] [facility] [address] [facility] AT [address] [place] [place] 57  Phone: [phone] Fax: [phone] OF CARE/RECERTIFICATION FOR PHYSICAL THERAPY          Patient Name: Mendel June : 1940   Treatment/Medical Diagnosis: Right ankle pain [M25.571]   Onset Date: 17    Referral Source: Benny Gordon MD Start of Care [facility]): 17   Prior Hospitalization: See Medical History Provider #: [id number]   Prior Level of Function: enjoys shopping/antiquing, people watching at the mall, playing with grandchildren   Comorbidities: Hx significant kidney stones requiring multiple surgeries; allergies; R knee torn meniscus; sedentary; arthritis; back pain; BMI>30; previous accidents; kidney problems; visual impairment   Medications: Verified on Patient Summary List   Visits from [facility] [place]: 22 Missed Visits: 1   Goals :  1.  Patient will improve FOTO score to 53 points to indicate improved functional status goal met at 57/100 (40 /100 at IE )   2. Patient will be independent with HEP to self-manage and prevent symptoms upon DC.  - goal met - patient reports compliance with HEP (goal est and progressed from IE)   3. Pt will have a decrease in ave pain of R ankle to < or = 5/10 to improve gait  Goal met at 4/10 per face scale (7-8/10 at IE)   4. Pt will be able to ambulate the distance of 5 house in her neighborhood without an increase in pain or instability to return to her PLOF  Goal met - patient can amb 15 min (less than 5 house length at last assess)     Key Functional Changes/Progress:   Pain at best 0 in am, at worst 7  Subjective % improvement 70%  Objective:     Ankle AROM : DF 9, PF 50 IV 36,EV 50   Ankle strength : DF 5, PF 4 IV 5,EV 5   Gait : improved heel strike and keny, decreased antalgia    SR 30 sec B    SLS 10-15 sec without UE support intermittently   Improvements: self managing with cream, TENs and HEP, 15-20 min walking tolerance Deficits none specifically noted other than prolonged walking   Problem List: pain affecting function and impaired gait/ balance   Treatment Plan may include any combination of the following: Therapeutic exercise, Therapeutic activities, Neuromuscular re-education, Physical agent/modality, Gait/balance training, Manual therapy, Aquatic therapy and Patient education  Patient Goal(s) has been updated and includes:     Goals for this certification period include and are to be achieved in   1  treatments:  1. Patient will be IND with DC HEP   Frequency / Duration:   Patient to be seen   1   times per week for   1    treatments:  G-Codes (GP): Mobility: [id number] Current  CK= 40-59%   Goal  CK= 40-59%  D/C  CK= 40-59%. The severity rating is based on the FOTO Score  Assessments/Recommendations: Patient will cont 1 more scheduled visit to finalize HEP and then DC to self management   If you have any questions/comments please contact us directly at [phone]. Thank you for allowing us to assist in the care of your patient. Therapist Signature: Bakari Bhardwaj, PT Date: 3/09/5420   Certification Period:  Reporting Period: 9/25/17-12/23/17 6/22/17-9/25/17 Time: 1253pm   NOTE TO PHYSICIAN:  PLEASE COMPLETE THE ORDERS BELOW AND FAX TO   [facility] [place]: [phone]  If you are unable to process this request in 24 hours please contact our office: [phone]    ___ I have read the above report and request that my patient continue as recommended.   ___ I have read the above report and request that my patient continue therapy with the following changes/special instructions: ________________________________________________   ___ I have read the above report and request that my patient be discharged from therapy.      Physician Signature:        Date:       Time:

## 2017-09-28 ENCOUNTER — HOSPITAL ENCOUNTER (OUTPATIENT)
Dept: PHYSICAL THERAPY | Age: 77
Discharge: HOME | End: 2017-09-28
Payer: MEDICARE

## 2017-09-28 PROCEDURE — 97016 VASOPNEUMATIC DEVICE THERAPY: CPT

## 2017-09-28 PROCEDURE — 97110 THERAPEUTIC EXERCISES: CPT

## 2017-09-28 PROCEDURE — 97140 MANUAL THERAPY 1/> REGIONS: CPT

## 2017-09-28 NOTE — PROGRESS NOTES
[address] [facility] [address] [facility] AT [address], [place], [place] [address]  Phone: [phone] Fax [phone] SUMMARY  Patient Name: Ainsley Gomez : 1940   Treatment/Medical Diagnosis: Right ankle pain [M25.571]   Referral Source: Luz Marina Patricio MD     Date of Initial Visit: 17 Attended Visits: 25 Missed Visits: 1     SUMMARY OF TREATMENT  Patient was being treated for R heel spurring/ pain. Treatment included progressive therex for progressive therex for ROM, strength,flexibility, gait training manual and modalities as needed. CURRENT STATUS  Patient made good progress with PT. Patient reports 70% subjective improvement. Patient has all the tools and knowledge needed to continue progress via HEP at this time. Assessment as follows:  See Recert send    1.  Patient will be IND with DC HEP - goal me t- patient compliant with HEP with good form and use of topical gel and TENs for pain relief. RECOMMENDATIONS  Discontinue therapy. Progressing towards or have reached established goals. Gcode: Mobility:   Goal  CK= 40-59%  D/C  CK= 40-59%. The severity rating is based on the FOTO Score  If you have any questions/comments please contact us directly at [phone]. Thank you for allowing us to assist in the care of your patient.   Therapist Signature: Marie Harrison PT Date:    Reporting Period: 17- 17 Time: 2:23 PM

## 2017-09-28 NOTE — PROGRESS NOTES
PT DAILY TREATMENT NOTE 8-    Patient Name: Abby Smith  Date:2017  : 1940  -  Patient  Verified  Payor: Mahi Lisa / Plan: [place] PART A & B / Product Type: Medicare /    In time:200 Out time:250  Total Treatment Time (min): 50  Total Timed Codes (min): 40  1:1 Treatment Time (min): 200- 239 (39)  Visit #: 2 of 2    Treatment Area: Right ankle pain [M25.571]    SUBJECTIVE  Pain Level (0-10 scale): 5  Any medication changes, allergies to medications, adverse drug reactions, diagnosis change, or new procedure performed?: [x] No    [] Yes (see summary sheet for update)  Subjective functional status/changes:   [] No changes reported  \"I have been moving all morning. \"    OBJECTIVE  Modality rationale: decrease edema, decrease inflammation and decrease pain to improve the patients ability to ambulate, perform stairs   Min Type Additional Details    [] Estim: []Att   []Unatt        []TENS instruct                  []IFC  []Premod   []NMES                     []Other:  []w/US   []w/ice   []w/heat  Position:  Location:    []  Traction: [] Cervical       []Lumbar                       [] Prone          []Supine                       []Intermittent   []Continuous Lbs:  [] before manual  [] after manual   H!  [x]  Ultrasound: []Continuous   [x] Pulsed                           []1MHz   [x]3MHz Location: R achilles insertion   W/cm2: 1.3    []  Iontophoresis with dexamethasone         Location: [] Take home patch   [] In clinic    []  Ice     []  heat  []  Ice massage Position:  Location:   15 [x]  Vasopneumatic Device with LE elevated  Pressure:       [] lo [] med [x] hi   Temperature: [x] lo [] med [] hi   [x] Skin assessment post-treatment:  [x]intact []redness- no adverse reaction       []redness  adverse reaction:       31 min Therapeutic Exercise:  [x] See flow sheet :    Rationale: increase ROM, increase strength and improve coordination to improve the patients ability to improve gait, ADLs, IADL's    9 min Manual Therapy: roller to [address], manual DF mobs grade 3-4/ GSC stretch    Rationale: decrease pain, increase ROM, increase tissue extensibility and decrease trigger points to improve mobility          x min Patient Education: [x] Review HEP  For DC         Other Objective/Functional Measures:    Reviewed therex form for HEP     Pain Level (0-10 scale) post treatment: 2    ASSESSMENT/Changes in Function:   SEE PN/ DC      [x]  See Discharge Summary         Progress towards goals / Updated goals:   Goals for this certification period include and are to be achieved in   1  treatments:  1. Patient will be IND with DC HEP - goal me t- patient compliant with HEP with good form and use of topical gel and TENs for pain relief.      PLAN     [x]  Discharge due to:_  Program complete, goals progressing      Suyapa Borden, PT 9/28/2017

## 2017-11-08 ENCOUNTER — HOSPITAL ENCOUNTER (OUTPATIENT)
Dept: MRI IMAGING | Age: 77
Discharge: HOME | End: 2017-11-08
Attending: PHYSICAL MEDICINE & REHABILITATION
Payer: MEDICARE

## 2017-11-08 DIAGNOSIS — M54.50 LUMBAGO: ICD-10-CM

## 2017-11-08 PROCEDURE — 72148 MRI LUMBAR SPINE W/O DYE: CPT

## 2018-07-23 ENCOUNTER — HOSPITAL ENCOUNTER (OUTPATIENT)
Dept: CT IMAGING | Age: 78
Discharge: HOME | End: 2018-07-23
Attending: UROLOGY
Payer: MEDICARE

## 2018-07-23 DIAGNOSIS — R31.0 GROSS HEMATURIA: ICD-10-CM

## 2018-07-23 LAB — CREAT UR-MCNC: 1 MG/DL (ref 0.6–1.3)

## 2018-07-23 PROCEDURE — 74178 CT ABD&PLV WO CNTR FLWD CNTR: CPT

## 2018-07-23 PROCEDURE — 74011636320 HC RX REV CODE- 636/320: Performed by: UROLOGY

## 2018-07-23 PROCEDURE — 82565 ASSAY OF CREATININE: CPT

## 2018-07-23 RX ADMIN — IOPAMIDOL 110 ML: 612 INJECTION, SOLUTION INTRAVENOUS at 14:08

## 2019-12-17 ENCOUNTER — APPOINTMENT (OUTPATIENT)
Dept: GENERAL RADIOLOGY | Age: 79
End: 2019-12-17
Attending: PHYSICIAN ASSISTANT
Payer: MEDICARE

## 2019-12-17 ENCOUNTER — HOSPITAL ENCOUNTER (EMERGENCY)
Age: 79
Discharge: HOME | End: 2019-12-17
Attending: EMERGENCY MEDICINE | Admitting: EMERGENCY MEDICINE
Payer: MEDICARE

## 2019-12-17 VITALS
HEIGHT: 70 IN | SYSTOLIC BLOOD PRESSURE: 104 MMHG | TEMPERATURE: 97.6 F | OXYGEN SATURATION: 98 % | DIASTOLIC BLOOD PRESSURE: 54 MMHG | WEIGHT: 206 LBS | RESPIRATION RATE: 22 BRPM | BODY MASS INDEX: 29.49 KG/M2 | HEART RATE: 66 BPM

## 2019-12-17 DIAGNOSIS — N39.0 ACUTE UTI: Primary | ICD-10-CM

## 2019-12-17 DIAGNOSIS — N17.9 AKI (ACUTE KIDNEY INJURY) (HCC): ICD-10-CM

## 2019-12-17 LAB
ALBUMIN SERPL-MCNC: 3.1 G/DL (ref 3.4–5)
ALBUMIN/GLOB SERPL: 0.8 {RATIO} (ref 0.8–1.7)
ALP SERPL-CCNC: 55 U/L (ref 45–117)
ALT SERPL-CCNC: 25 U/L (ref 13–56)
ANION GAP BLD CALC-SCNC: 13 MMOL/L (ref 10–20)
ANION GAP SERPL CALC-SCNC: 11 MMOL/L (ref 3–18)
APPEARANCE UR: ABNORMAL
AST SERPL-CCNC: 29 U/L (ref 10–38)
BACTERIA URNS QL MICRO: ABNORMAL /HPF
BASOPHILS # BLD: 0 K/UL (ref 0–0.1)
BASOPHILS NFR BLD: 0 % (ref 0–2)
BILIRUB SERPL-MCNC: 0.7 MG/DL (ref 0.2–1)
BILIRUB UR QL: NEGATIVE
BUN BLD-MCNC: 68 MG/DL (ref 7–18)
BUN SERPL-MCNC: 60 MG/DL (ref 7–18)
BUN/CREAT SERPL: 34 (ref 12–20)
CA-I BLD-MCNC: 0.94 MMOL/L (ref 1.12–1.32)
CALCIUM SERPL-MCNC: 9.2 MG/DL (ref 8.5–10.1)
CHLORIDE BLD-SCNC: 103 MMOL/L (ref 100–108)
CHLORIDE SERPL-SCNC: 97 MMOL/L (ref 100–111)
CO2 BLD-SCNC: 23 MMOL/L (ref 19–24)
CO2 SERPL-SCNC: 25 MMOL/L (ref 21–32)
COLOR UR: ABNORMAL
CREAT SERPL-MCNC: 1.74 MG/DL (ref 0.6–1.3)
CREAT UR-MCNC: 1.3 MG/DL (ref 0.6–1.3)
DIFFERENTIAL METHOD BLD: ABNORMAL
EOSINOPHIL # BLD: 0 K/UL (ref 0–0.4)
EOSINOPHIL NFR BLD: 0 % (ref 0–5)
EPITH CASTS URNS QL MICRO: ABNORMAL /LPF (ref 0–5)
ERYTHROCYTE [DISTWIDTH] IN BLOOD BY AUTOMATED COUNT: 13.7 % (ref 11.6–14.5)
FLUAV AG NPH QL IA: NEGATIVE
FLUBV AG NOSE QL IA: NEGATIVE
GLOBULIN SER CALC-MCNC: 3.9 G/DL (ref 2–4)
GLUCOSE BLD STRIP.AUTO-MCNC: 97 MG/DL (ref 74–106)
GLUCOSE SERPL-MCNC: 125 MG/DL (ref 74–99)
GLUCOSE UR STRIP.AUTO-MCNC: NEGATIVE MG/DL
HCT VFR BLD AUTO: 40.7 % (ref 35–45)
HCT VFR BLD CALC: 32 % (ref 36–49)
HGB BLD-MCNC: 10.9 G/DL (ref 12–16)
HGB BLD-MCNC: 14.3 G/DL (ref 12–16)
HGB UR QL STRIP: ABNORMAL
KETONES UR QL STRIP.AUTO: NEGATIVE MG/DL
LEUKOCYTE ESTERASE UR QL STRIP.AUTO: ABNORMAL
LYMPHOCYTES # BLD: 0.8 K/UL (ref 0.9–3.6)
LYMPHOCYTES NFR BLD: 6 % (ref 21–52)
MAGNESIUM SERPL-MCNC: 2.3 MG/DL (ref 1.6–2.6)
MCH RBC QN AUTO: 35 PG (ref 24–34)
MCHC RBC AUTO-ENTMCNC: 35.1 G/DL (ref 31–37)
MCV RBC AUTO: 99.8 FL (ref 74–97)
MONOCYTES # BLD: 1.1 K/UL (ref 0.05–1.2)
MONOCYTES NFR BLD: 9 % (ref 3–10)
NEUTS SEG # BLD: 10.1 K/UL (ref 1.8–8)
NEUTS SEG NFR BLD: 85 % (ref 40–73)
NITRITE UR QL STRIP.AUTO: NEGATIVE
PH UR STRIP: 5.5 [PH] (ref 5–8)
PLATELET # BLD AUTO: 217 K/UL (ref 135–420)
PMV BLD AUTO: 9.2 FL (ref 9.2–11.8)
POTASSIUM BLD-SCNC: 6 MMOL/L (ref 3.5–5.5)
POTASSIUM SERPL-SCNC: 3.7 MMOL/L (ref 3.5–5.5)
PROT SERPL-MCNC: 7 G/DL (ref 6.4–8.2)
PROT UR STRIP-MCNC: 100 MG/DL
RBC # BLD AUTO: 4.08 M/UL (ref 4.2–5.3)
RBC #/AREA URNS HPF: ABNORMAL /HPF (ref 0–5)
SODIUM BLD-SCNC: 132 MMOL/L (ref 136–145)
SODIUM SERPL-SCNC: 133 MMOL/L (ref 136–145)
SP GR UR REFRACTOMETRY: 1.02 (ref 1–1.03)
TROPONIN I SERPL-MCNC: <0.02 NG/ML (ref 0–0.04)
UROBILINOGEN UR QL STRIP.AUTO: 1 EU/DL (ref 0.2–1)
WBC # BLD AUTO: 12 K/UL (ref 4.6–13.2)
WBC URNS QL MICRO: ABNORMAL /HPF (ref 0–4)

## 2019-12-17 PROCEDURE — 87186 SC STD MICRODIL/AGAR DIL: CPT

## 2019-12-17 PROCEDURE — 87077 CULTURE AEROBIC IDENTIFY: CPT

## 2019-12-17 PROCEDURE — 96361 HYDRATE IV INFUSION ADD-ON: CPT

## 2019-12-17 PROCEDURE — 80047 BASIC METABLC PNL IONIZED CA: CPT

## 2019-12-17 PROCEDURE — 96365 THER/PROPH/DIAG IV INF INIT: CPT

## 2019-12-17 PROCEDURE — 93005 ELECTROCARDIOGRAM TRACING: CPT

## 2019-12-17 PROCEDURE — 74011250636 HC RX REV CODE- 250/636: Performed by: EMERGENCY MEDICINE

## 2019-12-17 PROCEDURE — 87086 URINE CULTURE/COLONY COUNT: CPT

## 2019-12-17 PROCEDURE — 85025 COMPLETE CBC W/AUTO DIFF WBC: CPT

## 2019-12-17 PROCEDURE — 71046 X-RAY EXAM CHEST 2 VIEWS: CPT

## 2019-12-17 PROCEDURE — 83735 ASSAY OF MAGNESIUM: CPT

## 2019-12-17 PROCEDURE — 84484 ASSAY OF TROPONIN QUANT: CPT

## 2019-12-17 PROCEDURE — 87804 INFLUENZA ASSAY W/OPTIC: CPT

## 2019-12-17 PROCEDURE — 81001 URINALYSIS AUTO W/SCOPE: CPT

## 2019-12-17 PROCEDURE — 99284 EMERGENCY DEPT VISIT MOD MDM: CPT

## 2019-12-17 PROCEDURE — 74011000258 HC RX REV CODE- 258: Performed by: EMERGENCY MEDICINE

## 2019-12-17 PROCEDURE — 80053 COMPREHEN METABOLIC PANEL: CPT

## 2019-12-17 RX ORDER — CEFDINIR 300 MG/1
300 CAPSULE ORAL 2 TIMES DAILY
Qty: 14 CAP | Refills: 0 | Status: SHIPPED | OUTPATIENT
Start: 2019-12-17 | End: 2019-12-24

## 2019-12-17 RX ADMIN — SODIUM CHLORIDE 1000 ML: 900 INJECTION, SOLUTION INTRAVENOUS at 18:10

## 2019-12-17 RX ADMIN — SODIUM CHLORIDE 1000 ML: 900 INJECTION, SOLUTION INTRAVENOUS at 19:54

## 2019-12-17 RX ADMIN — CEFTRIAXONE 1 G: 1 INJECTION, POWDER, FOR SOLUTION INTRAMUSCULAR; INTRAVENOUS at 20:49

## 2019-12-17 NOTE — ED TRIAGE NOTES
Pt arrives with c/o pain in flank left side, nausea, vomiting, diarrhea and restlessness. Pt states hx of kidney stones and spoke with Dr Daryn Hall and Serjoi Godfrey who think her K is low. Pt states generalized allover pain 8/10.

## 2019-12-17 NOTE — ED PROVIDER NOTES
EMERGENCY DEPARTMENT HISTORY AND PHYSICAL EXAM    6:02 PM      Date: 12/17/2019  Patient Name: Yanique Kline    History of Presenting Illness     Chief Complaint   Patient presents with    Abdominal Pain    Vomiting    Diarrhea         History Provided By: Patient      Additional History (Context): Yanique Kline is a 70 y.o. female who presents with fatigue, chills, nausea, vomiting. She states over the past 4 days she has had generalized fatigue, chills, and several episodes of nonbloody nonbilious emesis. Patient states that she thought that she had problems with her kidney stones, which she has a well-known history of. She initially had some left-sided flank pain and some diarrhea, but this has resolved. The patient continues to not feel well. She was instructed by her PCP to come in for evaluation, and to check her potassium. The patient is on potassium supplements but has not been L take them for the past 4 days due to her nausea and vomiting. Patient denies any fevers, lightheadedness, dizziness, chest pain, shortness of breath, abdominal pain, dysuria, hematuria. No recent changes in medications. No known sick contacts. PCP: Isidoro Yanez MD      Current Outpatient Medications   Medication Sig Dispense Refill    cefdinir (OMNICEF) 300 mg capsule Take 1 Cap by mouth two (2) times a day for 7 days. 14 Cap 0    allopurinol (ZYLOPRIM) 300 mg tablet       alendronate (FOSAMAX) 35 mg tablet 35 mg.  chlorthalidone (HYGROTEN) 50 mg tablet Take  by mouth daily.  spironolactone (ALDACTONE) 25 mg tablet Take 50 mg by mouth daily.  DOCOSAHEXANOIC ACID/EPA (FISH OIL PO) Take  by mouth.  potassium citrate (UROCIT-K 10) 10 mEq TbER Take 30 mEq by mouth three (3) times daily (with meals).  fenofibrate nanocrystallized (TRICOR) 145 mg tablet Take  by mouth daily.  folic acid-vit U7-HJS P21 (FOLTX) 2.5-25-2 mg tablet Take 1 Tab by mouth daily.         buPROPion SR [facility] SR) 150 mg SR tablet Take  by mouth daily. Indications: Smoking Cessation      simvastatin (ZOCOR) 40 mg tablet Take  by mouth nightly.  aspirin 81 mg tablet Take 81 mg by mouth daily. Per patient, stated that she already placed this medicine on hold since August 2013.          Past History     Past Medical History:  Past Medical History:   Diagnosis Date    Achilles tendinitis     Atrophic urethritis     Back pain     Bone loss     Bursitis     Cigarette smoker     Degenerative arthritis of hip     Depression     Diverticular disease     Fibrocystic breast disease     Gallbladder problem     sludge in gall bladder    Gross hematuria     Hemorrhoids     Hepatosplenomegaly     Hernia     History of kidney stones     Hypercholesteremia     Kidney stone     h/o multiple kidney stones    Left Achilles tendinitis     Lumbar facet arthropathy     Osteopenia     Urinary frequency        Past Surgical History:  Past Surgical History:   Procedure Laterality Date    ABDOMEN SURGERY PROC UNLISTED  1960s/1980s    many yrs ago for kidney stones    HX CATARACT REMOVAL      LT eye, RT eye    HX COLONOSCOPY      HX HEENT      cataract surgery    HX LITHOTRIPSY  05/06/2015    DP, Dr. Yoan Harrison, Left ESWL    HX LITHOTRIPSY  2000    HX TONSILLECTOMY  1953    HX UROLOGICAL  09/2013    cysto, retrograde pyelogram and ureteroscopy, laser lithotripsy and double-J stent placement        Family History:  Family History   Problem Relation Age of Onset    Dementia Mother     Osteoporosis Mother     Alzheimer Mother     Cancer Father         Colon Cancer    Cancer Sister         Breast Cancer       Social History:  Social History     Tobacco Use    Smoking status: Current Every Day Smoker     Packs/day: 1.00     Years: 30.00     Pack years: 30.00     Types: Cigarettes    Smokeless tobacco: Never Used    Tobacco comment: pt is now cutting down on number cig   Substance Use Topics    Alcohol use: Yes     Comment: rarely    Drug use: No       Allergies: Allergies   Allergen Reactions    Sulfur Other (comments)     Wrong allergy    Sulfa (Sulfonamide Antibiotics) Rash         Review of Systems       Review of Systems   Constitutional: Positive for chills and fatigue. Negative for fever. HENT: Negative for congestion, rhinorrhea, sinus pressure, sinus pain, sneezing and sore throat. Eyes: Negative for photophobia and visual disturbance. Respiratory: Negative for cough, shortness of breath and wheezing. Cardiovascular: Negative for chest pain and palpitations. Gastrointestinal: Positive for abdominal pain, diarrhea, nausea and vomiting. Negative for constipation. Genitourinary: Negative for dysuria, frequency and hematuria. Musculoskeletal: Negative for back pain, neck pain and neck stiffness. Skin: Negative for rash and wound. Neurological: Negative for dizziness, light-headedness and headaches. Psychiatric/Behavioral: Negative for agitation, behavioral problems and confusion. Physical Exam     Visit Vitals  /54   Pulse 66   Temp 97.6 °F (36.4 °C)   Resp 22   Ht 5' 10\" (1.778 m)   Wt 93.4 kg (206 lb)   SpO2 98%   BMI 29.56 kg/m²       Physical Exam  Constitutional:       General: She is not in acute distress. Appearance: She is well-developed. She is not ill-appearing. HENT:      Head: Normocephalic and atraumatic. Right Ear: There is no impacted cerumen. Left Ear: There is no impacted cerumen. Nose: No congestion or rhinorrhea. Mouth/Throat:      Pharynx: No oropharyngeal exudate or posterior oropharyngeal erythema. Eyes:      General:         Right eye: No discharge. Left eye: No discharge. Neck:      Musculoskeletal: Normal range of motion and neck supple. No neck rigidity. Cardiovascular:      Rate and Rhythm: Normal rate. Heart sounds: No murmur.    Pulmonary:      Effort: Pulmonary effort is normal. No respiratory distress. Breath sounds: No wheezing. Comments: Clear breath sounds in all lung fields. No acute respiratory distress. Pulse ox 97% on room air. Abdominal:      General: Abdomen is flat. There is no distension. There are no signs of injury. Tenderness: There is no tenderness. Comments: No abdominal tenderness palpation. Abdomen is soft, nondistended. No guarding rigidity. Musculoskeletal: Normal range of motion. General: No swelling or deformity. Lymphadenopathy:      Cervical: No cervical adenopathy. Skin:     General: Skin is warm. Capillary Refill: Capillary refill takes less than 2 seconds. Coloration: Skin is not jaundiced. Findings: No bruising. Neurological:      General: No focal deficit present. Mental Status: She is alert and oriented to person, place, and time. Cranial Nerves: No cranial nerve deficit. Motor: No weakness. Comments: Patient is awake, alert, oriented x3. No focal neurological deficits. Psychiatric:         Mood and Affect: Mood normal.         Thought Content: Thought content normal.           Diagnostic Study Results     Labs -  No results found for this or any previous visit (from the past 12 hour(s)). Radiologic Studies -   XR CHEST PA LAT   Final Result   IMPRESSION:      No acute cardiopulmonary disease.      _______________            Medical Decision Making   I am the first provider for this patient. I reviewed the vital signs, available nursing notes, past medical history, past surgical history, family history and social history. Vital Signs-Reviewed the patient's vital signs. EKG: Sinus rhythm, heart rate 73. PVC. No other acute ischemic changes seen. Records Reviewed: Nursing Notes (Time of Review: 6:02 PM)    ED Course: Progress Notes, Reevaluation, and Consults:  Time: 19:28. Updated the patient on the results thus far.   The patient states that she would like something to drink at this time. I did discuss her creatinine. The patient would like to try another liter of fluids and then recheck her creatinine. The patient does not want to be admitted. She is agreeable with the plan thus far. Time: 20:11. I am signing this patient out to Everardo Roberts. Will be treated for her urinary tract infection. Prescriptions printed out. Provider Notes (Medical Decision Making): MDM        Critical Care Time: 0      Diagnosis     Clinical Impression:   1. Acute UTI    2. REMY (acute kidney injury) [facility])        Disposition: Discharge    Follow-up Information     Follow up With Specialties Details Why [facility] EMERGENCY DEPT Emergency Medicine  If symptoms worsen [facility]    Emily Najera MD Family Practice  24-48 hours for a recheck of your creatinine level and reassessment  [address] [place] [address] 28             Discharge Medication List as of 12/17/2019  8:55 PM      START taking these medications    Details   cefdinir (OMNICEF) 300 mg capsule Take 1 Cap by mouth two (2) times a day for 7 days. , Print, Disp-14 Cap, R-0         CONTINUE these medications which have NOT CHANGED    Details   allopurinol (ZYLOPRIM) 300 mg tablet Historical Med      alendronate (FOSAMAX) 35 mg tablet 35 mg., Historical Med      chlorthalidone (HYGROTEN) 50 mg tablet Take  by mouth daily. , Historical Med      spironolactone (ALDACTONE) 25 mg tablet Take 50 mg by mouth daily. , Historical Med      DOCOSAHEXANOIC ACID/EPA (FISH OIL PO) Take  by mouth., Historical Med      potassium citrate (UROCIT-K 10) 10 mEq TbER Take 30 mEq by mouth three (3) times daily (with meals). , Historical Med      fenofibrate nanocrystallized (TRICOR) 145 mg tablet Take  by mouth daily. , Historical Med      folic acid-vit X1-JFR R85 (FOLTX) 2.5-25-2 mg tablet Take 1 Tab by mouth daily.   , Historical Med      buPROPion SR (WELLBUTRIN SR) 150 mg SR tablet Take  by mouth daily. Indications: Smoking Cessation, Historical Med      simvastatin (ZOCOR) 40 mg tablet Take  by mouth nightly.  , Historical Med      aspirin 81 mg tablet Take 81 mg by mouth daily. Per patient, stated that she already placed this medicine on hold since August 2013., Historical Med           _______________________________    Please note that this dictation was completed with [employer], the computer voice recognition software. Quite often unanticipated grammatical, syntax, homophones, and other interpretive errors are inadvertently transcribed by the computer software. Please disregard these errors. Please excuse any errors that have escaped final proofreading.

## 2019-12-18 LAB
ATRIAL RATE: 73 BPM
CALCULATED P AXIS, ECG09: 38 DEGREES
CALCULATED R AXIS, ECG10: -10 DEGREES
CALCULATED T AXIS, ECG11: 54 DEGREES
DIAGNOSIS, 93000: NORMAL
P-R INTERVAL, ECG05: 172 MS
Q-T INTERVAL, ECG07: 416 MS
QRS DURATION, ECG06: 88 MS
QTC CALCULATION (BEZET), ECG08: 458 MS
VENTRICULAR RATE, ECG03: 73 BPM

## 2019-12-18 NOTE — ED NOTES
Patient stating she feels exponentially better, \"I feel like dancing! \" and reports increased appetite and energy. Stating she is ready to go home, provider aware.

## 2019-12-18 NOTE — PROGRESS NOTES
ED Course as of Dec 17 2017   Tue Dec 17, 2019   2016 Patient taken in signout from Dr. Malika Curran with plan to follow-up repeat BMP. She reports the patient is not willing to stay for admission and is very adamant about going home even though she knows about her acute kidney injuries and the risks involved. [KG]      ED Course User Index  [KG] Evelyn Cunningham R, DO     Patient's repeat creatinine 1.3.  P.o. challenge successfully. States she is feeling much better would like to go home. Will discharge home according to the previous physicians plan.

## 2019-12-18 NOTE — DISCHARGE INSTRUCTIONS
Patient Education        Acute Kidney Injury: Care Instructions  Your Care Instructions    Acute kidney injury (REMY) is a sudden decrease in kidney function. This can happen over a period of hours, days or, in some cases, weeks. REMY used to be called acute renal failure, but kidney failure doesn't always happen with REMY. Common causes of REMY are dehydration, blood loss, and medicines. When REMY happens, the kidneys have trouble removing waste and excess fluids from the body. The waste and fluids build up and become harmful. Kidney function may return to normal if the cause of REMY is treated quickly. Your chance of a full recovery depends on what caused the problem, how quickly the cause was treated, and what other medical problems you have. A machine may be used to help your kidneys remove waste and fluids for a short period of time. This is called dialysis. Follow-up care is a key part of your treatment and safety. Be sure to make and go to all appointments, and call your doctor if you are having problems. It's also a good idea to know your test results and keep a list of the medicines you take. How can you care for yourself at home? · Talk to your doctor about how much fluid you should drink. · Eat a balanced diet. Talk to your doctor or a dietitian about what type of diet may be best for you. You may need to limit sodium, potassium, and phosphorus. · If you need dialysis, follow the instructions and schedule for dialysis that your doctor gives you. · Do not smoke. Smoking can make your condition worse. If you need help quitting, talk to your doctor about stop-smoking programs and medicines. These can increase your chances of quitting for good. · Do not drink alcohol. · Review all of your medicines with your doctor.  Do not take any medicines, including nonsteroidal anti-inflammatory drugs (NSAIDs) such as ibuprofen (Advil, Motrin) or naproxen (Aleve), unless your doctor says it is safe for you to do so.  · Make sure that anyone treating you for any health problem knows that you have had REMY. When should you call for help? Call 911 anytime you think you may need emergency care. For example, call if:    · You passed out (lost consciousness).    Call your doctor now or seek immediate medical care if:    · You have new or worse nausea and vomiting.     · You have much less urine than normal, or you have no urine.     · You are feeling confused or cannot think clearly.     · You have new or more blood in your urine.     · You have new swelling.     · You are dizzy or lightheaded, or feel like you may faint.    Watch closely for changes in your health, and be sure to contact your doctor if:    · You do not get better as expected. Where can you learn more? Go to http://leandro-michael.info/. Enter I792 in the search box to learn more about \"Acute Kidney Injury: Care Instructions. \"  Current as of: October 31, 2018  Content Version: 12.2  © 2134-7620 [employer]. Care instructions adapted under license by [employer] (which disclaims liability or warranty for this information). If you have questions about a medical condition or this instruction, always ask your healthcare professional. [address] any warranty or liability for your use of this information.

## 2019-12-21 LAB
BACTERIA SPEC CULT: ABNORMAL
SERVICE CMNT-IMP: ABNORMAL

## 2020-02-19 ENCOUNTER — HOSPITAL ENCOUNTER (OUTPATIENT)
Dept: GENERAL RADIOLOGY | Age: 80
Discharge: HOME | End: 2020-02-19
Attending: UROLOGY
Payer: MEDICARE

## 2020-02-19 ENCOUNTER — HOSPITAL ENCOUNTER (OUTPATIENT)
Dept: LAB | Age: 80
Discharge: HOME | End: 2020-02-19
Payer: MEDICARE

## 2020-02-19 ENCOUNTER — HOSPITAL ENCOUNTER (OUTPATIENT)
Dept: PREADMISSION TESTING | Age: 80
Discharge: HOME | End: 2020-02-19
Payer: MEDICARE

## 2020-02-19 DIAGNOSIS — Z01.818 PRE-OP TESTING: ICD-10-CM

## 2020-02-19 LAB
ANION GAP SERPL CALC-SCNC: 6 MMOL/L (ref 3–18)
APPEARANCE UR: CLEAR
BACTERIA URNS QL MICRO: NEGATIVE /HPF
BASOPHILS # BLD: 0 K/UL (ref 0–0.1)
BASOPHILS NFR BLD: 0 % (ref 0–2)
BILIRUB UR QL: NEGATIVE
BUN SERPL-MCNC: 22 MG/DL (ref 7–18)
BUN/CREAT SERPL: 18 (ref 12–20)
CALCIUM SERPL-MCNC: 9.2 MG/DL (ref 8.5–10.1)
CHLORIDE SERPL-SCNC: 98 MMOL/L (ref 100–111)
CO2 SERPL-SCNC: 30 MMOL/L (ref 21–32)
COLOR UR: YELLOW
CREAT SERPL-MCNC: 1.2 MG/DL (ref 0.6–1.3)
DIFFERENTIAL METHOD BLD: ABNORMAL
EOSINOPHIL # BLD: 0.1 K/UL (ref 0–0.4)
EOSINOPHIL NFR BLD: 1 % (ref 0–5)
EPITH CASTS URNS QL MICRO: ABNORMAL /LPF (ref 0–5)
ERYTHROCYTE [DISTWIDTH] IN BLOOD BY AUTOMATED COUNT: 14.6 % (ref 11.6–14.5)
GLUCOSE SERPL-MCNC: 107 MG/DL (ref 74–99)
GLUCOSE UR STRIP.AUTO-MCNC: NEGATIVE MG/DL
HCT VFR BLD AUTO: 41.8 % (ref 35–45)
HGB BLD-MCNC: 14 G/DL (ref 12–16)
HGB UR QL STRIP: ABNORMAL
INR PPP: 1.1 (ref 0.8–1.2)
KETONES UR QL STRIP.AUTO: NEGATIVE MG/DL
LEUKOCYTE ESTERASE UR QL STRIP.AUTO: ABNORMAL
LYMPHOCYTES # BLD: 2.2 K/UL (ref 0.9–3.6)
LYMPHOCYTES NFR BLD: 24 % (ref 21–52)
MCH RBC QN AUTO: 33.8 PG (ref 24–34)
MCHC RBC AUTO-ENTMCNC: 33.5 G/DL (ref 31–37)
MCV RBC AUTO: 101 FL (ref 74–97)
MONOCYTES # BLD: 0.6 K/UL (ref 0.05–1.2)
MONOCYTES NFR BLD: 7 % (ref 3–10)
NEUTS SEG # BLD: 6.1 K/UL (ref 1.8–8)
NEUTS SEG NFR BLD: 68 % (ref 40–73)
NITRITE UR QL STRIP.AUTO: NEGATIVE
PH UR STRIP: 8 [PH] (ref 5–8)
PLATELET # BLD AUTO: 279 K/UL (ref 135–420)
PMV BLD AUTO: 9 FL (ref 9.2–11.8)
POTASSIUM SERPL-SCNC: 3.7 MMOL/L (ref 3.5–5.5)
PROT UR STRIP-MCNC: NEGATIVE MG/DL
PROTHROMBIN TIME: 13.9 SEC (ref 11.5–15.2)
RBC # BLD AUTO: 4.14 M/UL (ref 4.2–5.3)
RBC #/AREA URNS HPF: ABNORMAL /HPF (ref 0–5)
SODIUM SERPL-SCNC: 134 MMOL/L (ref 136–145)
SP GR UR REFRACTOMETRY: 1.01 (ref 1–1.03)
UROBILINOGEN UR QL STRIP.AUTO: 1 EU/DL (ref 0.2–1)
WBC # BLD AUTO: 9 K/UL (ref 4.6–13.2)
WBC URNS QL MICRO: ABNORMAL /HPF (ref 0–4)

## 2020-02-19 PROCEDURE — 81001 URINALYSIS AUTO W/SCOPE: CPT

## 2020-02-19 PROCEDURE — 87077 CULTURE AEROBIC IDENTIFY: CPT

## 2020-02-19 PROCEDURE — 87086 URINE CULTURE/COLONY COUNT: CPT

## 2020-02-19 PROCEDURE — 85025 COMPLETE CBC W/AUTO DIFF WBC: CPT

## 2020-02-19 PROCEDURE — 87186 SC STD MICRODIL/AGAR DIL: CPT

## 2020-02-19 PROCEDURE — 85610 PROTHROMBIN TIME: CPT

## 2020-02-19 PROCEDURE — 71046 X-RAY EXAM CHEST 2 VIEWS: CPT

## 2020-02-19 PROCEDURE — 80048 BASIC METABOLIC PNL TOTAL CA: CPT

## 2020-02-19 PROCEDURE — 36415 COLL VENOUS BLD VENIPUNCTURE: CPT

## 2020-02-19 PROCEDURE — 93005 ELECTROCARDIOGRAM TRACING: CPT

## 2020-02-19 RX ORDER — CHLORTHALIDONE 50 MG/1
100 TABLET ORAL DAILY
COMMUNITY

## 2020-02-19 RX ORDER — FOLIC ACID 1 MG/1
2 TABLET ORAL DAILY
COMMUNITY
End: 2020-03-02 | Stop reason: DRUGHIGH

## 2020-02-19 RX ORDER — SPIRONOLACTONE 100 MG/1
100 TABLET, FILM COATED ORAL DAILY
COMMUNITY

## 2020-02-19 NOTE — PERIOP NOTES
PAT - SURGICAL PRE-ADMISSION INSTRUCTIONS    NAME:  Natalie Olivera                                                          TODAY'S DATE:  2/19/2020    SURGERY DATE:  3/4/2020                                  SURGERY ARRIVAL TIME:   TBA    1. Do NOT eat or drink anything, including candy or gum, after 0300 on 03/04 , unless you have specific instructions from your Surgeon or Anesthesia Provider to do so. 2. No smoking on the day of surgery. 3. No alcohol 24 hours prior to the day of surgery. 4. No recreational drugs for one week prior to the day of surgery. 5. Leave all valuables, including money/purse, at home. 6. Remove all jewelry, nail polish, makeup (including mascara); no lotions, powders, deodorant, or perfume/cologne/after shave. 7. Glasses/Contact lenses and Dentures may be worn to the hospital.  They will be removed prior to surgery. 8. Call your doctor if symptoms of a cold or illness develop within 24 ours prior to surgery. 9. AN ADULT MUST DRIVE YOU HOME AFTER OUTPATIENT SURGERY. 10. If you are having an OUTPATIENT procedure, please make arrangements for a responsible adult to be with you for 24 hours after your surgery. 11. If you are admitted to the hospital, you will be assigned to a bed after surgery is complete. Normally a family member will not be able to see you until you are in your assigned bed. 15. Family is encouraged to accompany you to the hospital.  We ask visitors in the treatment area to be limited to ONE person at a time to ensure patient privacy. EXCEPTIONS WILL BE MADE AS NEEDED. 15. Children under 12 are discouraged from entering the treatment area and need to be supervised by an adult when in the waiting room. Special Instructions:    NONE. Patient Prep:    use CHG solution    These surgical instructions were reviewed with Kassi Schroeder during the PAT visit. A printed copy of the instructions was provided to Kassi Schroeder. Directions:   On the morning of surgery, please go to the [facility]. Enter the building from the [facility] entrance, 1st floor (next to the Emergency Room entrance). Take the elevator to the 2nd floor. Sign in at the Registration Desk.     If you have any questions and/or concerns, please do not hesitate to call:  (Prior to the day of surgery)  [facility] unit:  [phone]  (Day of surgery)  [facility] unit:  [phone]

## 2020-02-20 LAB
ATRIAL RATE: 90 BPM
CALCULATED P AXIS, ECG09: 81 DEGREES
CALCULATED R AXIS, ECG10: -54 DEGREES
CALCULATED T AXIS, ECG11: 78 DEGREES
DIAGNOSIS, 93000: NORMAL
P-R INTERVAL, ECG05: 176 MS
Q-T INTERVAL, ECG07: 374 MS
QRS DURATION, ECG06: 92 MS
QTC CALCULATION (BEZET), ECG08: 457 MS
VENTRICULAR RATE, ECG03: 90 BPM

## 2020-02-22 LAB
BACTERIA SPEC CULT: ABNORMAL
SERVICE CMNT-IMP: ABNORMAL

## 2020-03-03 ENCOUNTER — ANESTHESIA EVENT (OUTPATIENT)
Dept: SURGERY | Age: 80
End: 2020-03-03
Payer: MEDICARE

## 2020-03-03 NOTE — ANESTHESIA PREPROCEDURE EVALUATION
Relevant Problems   No relevant active problems       Anesthetic History               Review of Systems / Medical History  Patient summary reviewed, nursing notes reviewed and pertinent labs reviewed    Pulmonary                   Neuro/Psych              Cardiovascular    Hypertension: well controlled              Exercise tolerance: <4 METS  Comments: EKG:   Sinus rhythm with occasional premature ventricular complexes   Left anterior fascicular block   Abnormal ECG   When compared with ECG of 17-DEC-2019 17:59,   premature ventricular complexes are now present   aberrant conduction is no longer present   Left anterior fascicular block is now present    GI/Hepatic/Renal                Endo/Other        Obesity and arthritis     Other Findings   Comments: Hypercholesteremia (E78.00)    Bone loss (M89.8X9)    Osteopenia (M85.80)    Fibrocystic breast disease (N60.19)    Left Achilles tendinitis (M76.62)    Hemorrhoids (K64.9)    Bursitis (M71.9)    Diverticular disease (K57.90)    Degenerative arthritis of hip (M16.9)    Hernia (K46.9)    Lumbar facet arthropathy (M47.816)    Back pain (M54.9)    Gallbladder problem (K82.9)  sludge in gall bladder  History of kidney stones (T82.078)    Urinary frequency (R35.0)    Cigarette smoker (F17.210)    Achilles tendinitis (M76.60)    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

## 2020-03-04 ENCOUNTER — APPOINTMENT (OUTPATIENT)
Dept: GENERAL RADIOLOGY | Age: 80
End: 2020-03-04
Attending: UROLOGY
Payer: MEDICARE

## 2020-03-04 ENCOUNTER — HOSPITAL ENCOUNTER (OUTPATIENT)
Age: 80
Setting detail: OUTPATIENT SURGERY
Discharge: HOME | End: 2020-03-04
Attending: UROLOGY | Admitting: UROLOGY
Payer: MEDICARE

## 2020-03-04 ENCOUNTER — ANESTHESIA (OUTPATIENT)
Dept: SURGERY | Age: 80
End: 2020-03-04
Payer: MEDICARE

## 2020-03-04 VITALS
BODY MASS INDEX: 30.24 KG/M2 | RESPIRATION RATE: 26 BRPM | SYSTOLIC BLOOD PRESSURE: 105 MMHG | HEIGHT: 70 IN | WEIGHT: 211.2 LBS | HEART RATE: 66 BPM | DIASTOLIC BLOOD PRESSURE: 58 MMHG | TEMPERATURE: 97.1 F | OXYGEN SATURATION: 93 %

## 2020-03-04 DIAGNOSIS — N20.0 KIDNEY STONES: Primary | ICD-10-CM

## 2020-03-04 PROCEDURE — 77030018836 HC SOL IRR NACL ICUM -A: Performed by: UROLOGY

## 2020-03-04 PROCEDURE — 74420 UROGRAPHY RTRGR +-KUB: CPT

## 2020-03-04 PROCEDURE — 74011250636 HC RX REV CODE- 250/636: Performed by: UROLOGY

## 2020-03-04 PROCEDURE — C1758 CATHETER, URETERAL: HCPCS | Performed by: UROLOGY

## 2020-03-04 PROCEDURE — 76060000034 HC ANESTHESIA 1.5 TO 2 HR: Performed by: UROLOGY

## 2020-03-04 PROCEDURE — 74011000250 HC RX REV CODE- 250: Performed by: NURSE ANESTHETIST, CERTIFIED REGISTERED

## 2020-03-04 PROCEDURE — C1769 GUIDE WIRE: HCPCS | Performed by: UROLOGY

## 2020-03-04 PROCEDURE — 77030008683 HC TU ET CUF COVD -A: Performed by: ANESTHESIOLOGY

## 2020-03-04 PROCEDURE — 74011250636 HC RX REV CODE- 250/636: Performed by: NURSE ANESTHETIST, CERTIFIED REGISTERED

## 2020-03-04 PROCEDURE — 77030006974 HC BSKT URET RTVR BSC -C: Performed by: UROLOGY

## 2020-03-04 PROCEDURE — 76010000162 HC OR TIME 1.5 TO 2 HR INTENSV-TIER 1: Performed by: UROLOGY

## 2020-03-04 PROCEDURE — C1726 CATH, BAL DIL, NON-VASCULAR: HCPCS | Performed by: UROLOGY

## 2020-03-04 PROCEDURE — 77030018846 HC SOL IRR STRL H20 ICUM -A: Performed by: UROLOGY

## 2020-03-04 PROCEDURE — 77030018842 HC SOL IRR SOD CL 9% BAXT -A: Performed by: UROLOGY

## 2020-03-04 PROCEDURE — 74011636320 HC RX REV CODE- 636/320: Performed by: UROLOGY

## 2020-03-04 PROCEDURE — 76210000063 HC OR PH I REC FIRST 0.5 HR: Performed by: UROLOGY

## 2020-03-04 PROCEDURE — 77030012961 HC IRR KT CYSTO/TUR ICUM -A: Performed by: UROLOGY

## 2020-03-04 PROCEDURE — C2617 STENT, NON-COR, TEM W/O DEL: HCPCS | Performed by: UROLOGY

## 2020-03-04 PROCEDURE — 76210000026 HC REC RM PH II 1 TO 1.5 HR: Performed by: UROLOGY

## 2020-03-04 PROCEDURE — 77030032490 HC SLV COMPR SCD KNE COVD -B: Performed by: UROLOGY

## 2020-03-04 PROCEDURE — 74011250636 HC RX REV CODE- 250/636: Performed by: ANESTHESIOLOGY

## 2020-03-04 DEVICE — URETERAL STENT
Type: IMPLANTABLE DEVICE | Site: URETER | Status: FUNCTIONAL
Brand: POLARIS™ ULTRA

## 2020-03-04 RX ORDER — SUCCINYLCHOLINE CHLORIDE 100 MG/5ML
SYRINGE (ML) INTRAVENOUS AS NEEDED
Status: DISCONTINUED | OUTPATIENT
Start: 2020-03-04 | End: 2020-03-04 | Stop reason: HOSPADM

## 2020-03-04 RX ORDER — FENTANYL CITRATE 50 UG/ML
50 INJECTION, SOLUTION INTRAMUSCULAR; INTRAVENOUS
Status: DISCONTINUED | OUTPATIENT
Start: 2020-03-04 | End: 2020-03-04 | Stop reason: HOSPADM

## 2020-03-04 RX ORDER — MIDAZOLAM HYDROCHLORIDE 1 MG/ML
INJECTION, SOLUTION INTRAMUSCULAR; INTRAVENOUS AS NEEDED
Status: DISCONTINUED | OUTPATIENT
Start: 2020-03-04 | End: 2020-03-04 | Stop reason: HOSPADM

## 2020-03-04 RX ORDER — OXYCODONE AND ACETAMINOPHEN 5; 325 MG/1; MG/1
1 TABLET ORAL AS NEEDED
Status: DISCONTINUED | OUTPATIENT
Start: 2020-03-04 | End: 2020-03-04 | Stop reason: HOSPADM

## 2020-03-04 RX ORDER — SODIUM CHLORIDE, SODIUM LACTATE, POTASSIUM CHLORIDE, CALCIUM CHLORIDE 600; 310; 30; 20 MG/100ML; MG/100ML; MG/100ML; MG/100ML
25 INJECTION, SOLUTION INTRAVENOUS CONTINUOUS
Status: DISCONTINUED | OUTPATIENT
Start: 2020-03-04 | End: 2020-03-04 | Stop reason: HOSPADM

## 2020-03-04 RX ORDER — AMOXICILLIN AND CLAVULANATE POTASSIUM 500; 125 MG/1; MG/1
1 TABLET, FILM COATED ORAL 2 TIMES DAILY
Qty: 6 TAB | Refills: 0 | Status: SHIPPED | OUTPATIENT
Start: 2020-03-04 | End: 2020-03-07

## 2020-03-04 RX ORDER — VECURONIUM BROMIDE FOR INJECTION 1 MG/ML
INJECTION, POWDER, LYOPHILIZED, FOR SOLUTION INTRAVENOUS AS NEEDED
Status: DISCONTINUED | OUTPATIENT
Start: 2020-03-04 | End: 2020-03-04 | Stop reason: HOSPADM

## 2020-03-04 RX ORDER — PROPOFOL 10 MG/ML
INJECTION, EMULSION INTRAVENOUS AS NEEDED
Status: DISCONTINUED | OUTPATIENT
Start: 2020-03-04 | End: 2020-03-04 | Stop reason: HOSPADM

## 2020-03-04 RX ORDER — ONDANSETRON 2 MG/ML
4 INJECTION INTRAMUSCULAR; INTRAVENOUS ONCE
Status: COMPLETED | OUTPATIENT
Start: 2020-03-04 | End: 2020-03-04

## 2020-03-04 RX ORDER — LIDOCAINE HYDROCHLORIDE 20 MG/ML
INJECTION, SOLUTION EPIDURAL; INFILTRATION; INTRACAUDAL; PERINEURAL AS NEEDED
Status: DISCONTINUED | OUTPATIENT
Start: 2020-03-04 | End: 2020-03-04 | Stop reason: HOSPADM

## 2020-03-04 RX ORDER — GENTAMICIN SULFATE 40 MG/ML
INJECTION, SOLUTION INTRAMUSCULAR; INTRAVENOUS AS NEEDED
Status: DISCONTINUED | OUTPATIENT
Start: 2020-03-04 | End: 2020-03-04 | Stop reason: HOSPADM

## 2020-03-04 RX ORDER — LIDOCAINE HYDROCHLORIDE 10 MG/ML
0.1 INJECTION, SOLUTION EPIDURAL; INFILTRATION; INTRACAUDAL; PERINEURAL AS NEEDED
Status: DISCONTINUED | OUTPATIENT
Start: 2020-03-04 | End: 2020-03-04 | Stop reason: HOSPADM

## 2020-03-04 RX ORDER — TRAMADOL HYDROCHLORIDE 50 MG/1
50 TABLET ORAL
Qty: 15 TAB | Refills: 0 | Status: SHIPPED | OUTPATIENT
Start: 2020-03-04 | End: 2020-03-11

## 2020-03-04 RX ORDER — TAMSULOSIN HYDROCHLORIDE 0.4 MG/1
0.4 CAPSULE ORAL DAILY
Qty: 30 CAP | Refills: 0 | Status: SHIPPED | OUTPATIENT
Start: 2020-03-04 | End: 2020-05-04

## 2020-03-04 RX ORDER — FENTANYL CITRATE 50 UG/ML
INJECTION, SOLUTION INTRAMUSCULAR; INTRAVENOUS AS NEEDED
Status: DISCONTINUED | OUTPATIENT
Start: 2020-03-04 | End: 2020-03-04 | Stop reason: HOSPADM

## 2020-03-04 RX ORDER — CEFAZOLIN SODIUM 2 G/50ML
2 SOLUTION INTRAVENOUS
Status: COMPLETED | OUTPATIENT
Start: 2020-03-04 | End: 2020-03-04

## 2020-03-04 RX ORDER — MORPHINE SULFATE 2 MG/ML
2 INJECTION, SOLUTION INTRAMUSCULAR; INTRAVENOUS
Status: DISCONTINUED | OUTPATIENT
Start: 2020-03-04 | End: 2020-03-04 | Stop reason: HOSPADM

## 2020-03-04 RX ADMIN — ONDANSETRON 4 MG: 2 INJECTION INTRAMUSCULAR; INTRAVENOUS at 15:53

## 2020-03-04 RX ADMIN — Medication 100 MG: at 13:25

## 2020-03-04 RX ADMIN — LIDOCAINE HYDROCHLORIDE 20 MG: 20 INJECTION, SOLUTION INTRAVENOUS at 13:25

## 2020-03-04 RX ADMIN — PROPOFOL 150 MG: 10 INJECTION, EMULSION INTRAVENOUS at 13:25

## 2020-03-04 RX ADMIN — VECURONIUM BROMIDE FOR INJECTION 3 MG: 1 INJECTION, POWDER, LYOPHILIZED, FOR SOLUTION INTRAVENOUS at 13:42

## 2020-03-04 RX ADMIN — SODIUM CHLORIDE, SODIUM LACTATE, POTASSIUM CHLORIDE, AND CALCIUM CHLORIDE 25 ML/HR: 600; 310; 30; 20 INJECTION, SOLUTION INTRAVENOUS at 11:33

## 2020-03-04 RX ADMIN — FENTANYL CITRATE 100 MCG: 50 INJECTION, SOLUTION INTRAMUSCULAR; INTRAVENOUS at 13:22

## 2020-03-04 RX ADMIN — CEFAZOLIN SODIUM 2 G: 2 SOLUTION INTRAVENOUS at 13:27

## 2020-03-04 RX ADMIN — MIDAZOLAM 2 MG: 1 INJECTION INTRAMUSCULAR; INTRAVENOUS at 13:18

## 2020-03-04 RX ADMIN — FAMOTIDINE 20 MG: 10 INJECTION INTRAVENOUS at 11:33

## 2020-03-04 NOTE — PROCEDURES
3/4/2020   Nelly Rodriges  1940  [id number]    PREOPERATIVE DIAGNOSIS:  Left mid ureteral stone, left renal stones    POSTOPERATIVE DIAGNOSIS:  same    SURGEON:  Sury Mckeon MD    ASSISTANTS:   Artemio Yarbrough MD, Cynthia Red MD, RES    OPERATION PERFORMED:  Cystoscopy, left retrograde pyelogram, left ureteroscopy, and stent placement with intra-op interpretation of fluoroscopy < 1h.     ANESTHESIA:  General.    ESTIMATED BLOOD LOSS:  Minimal.    COMPLICATIONS:  None. INDICATIONS FOR PROCEDURE:  Nelly Rodriges is a 70 y.o.  female, who had a  left mid ureteral stone and multiple left renal stones found on CT scan, along with significant J hooking of mid ureter, deviating the ureter laterally. The patient is now brought back today for definitive treatment of the stones with cystoscopy,  ureteroscopy, laser lithotripsy, and stent placement. The patient understands risks, benefits, detailed procedure, and possible injury to the urethra, bladder, ureter, and kidney. Also possible need for additional surgery. FINDINGS:  1. Cystocele, unremarkable urethra, bladder without lesions concerning for malignancy  2.  fluoroscopy with radio-opaque 8mm mid ureteral stone, additional renal stones approx 1cm  3. Left retrograde pyelogram revealing J-hook at mid/proximal ureter with lateral course and acute angle at level of the stone  4. Ureteroscopy with stone noted at level slightly proximal to region of angulation in ureter. Edema noted surrounding stone. 5. Final retrograde without significant extrav. Final stent placement with proximal coil in renal pelvis and distal coil in bladder. SPECIMENS:  None    DRAINS:  A 6-Tu[place]uan, 28-cm double J stent      DESCRIPTION OF OPERATION:  After informed consent was obtained from the patient, the patient was identified and the patient was taken to the operating room on 3/4/2020, and placed in the supine position.   General anesthesia was administered as well as perioperative IV antibiotics. Sequential compression devices were applied to the lower extremities at the beginning of the case for DVT prophylaxis. The patient was then placed in the dorsal lithotomy position, prepped and draped in the usual sterile fashion. At the beginning of the case, a time-out was performed to properly identify the patient, surgery to be performed, and surgical site/side. We passed the 21-S[place]ankan rigid cystoscope through the urethra and into the bladder under vision without any difficulty, noting a normal urethra. A systematic evaluation of the bladder revealed no evidence of any suspicious bladder lesions. Ureteral orifices were in normal position. We cannulated the ureteral orifice with a 5-S[place]ankan open-ended ureteral catheter and gentle retrograde pyelogram was performed, noting a J-hook at mid/proximal ureter. The ureter had a lateral course twisting medially at an acute angle at level of the stone. There was noted mild  Hydronephrosis, and there was a 8 mm filling defect in the mid/proximal ureter, corresponding to the stone. We then passed a 0.038 Glidewire up to the level of the renal pelvis with some manipulation of the wire required to get past the stone. The ureteral catheter and cystoscope were then removed, leaving the Glidewire up the ureter. The dual lumen catheter was passed over this wire and a dual lumen catheter was advanced to be able to pass an additional sensor wire to the renal pelvis, confirmed with fluoroscopy. This sensor wire was affixed to the drape as a safety wire. Using the Glidewire under fluoroscopic guidance, we then passed the flexible ureteroscope up the ureter, to the level of the stone. A 272 micron laser fiber was passed up the scope. With the acute angle of the ureter it was not possible to find a safe angle to laser the stone.  Despite many attempts to change the angle of the scope, the deflection of the scope and the edema surrounding the stone would not permit a safe window to laser without endangering the mucosa. Glide wire was re-introduced and the flexible ureteroscope was removed. The semi-rigid ureteroscope was then passed into the bladder under direct vision and then up the left ureter alongside the pre-existing wires. It was not possible to get an appropriate angle with the semi rigid scope. An additional attempt was made with the flexible ureteroscope but it was also not possible to find a safe window to laser. Contrast injected through the scope revealed the persisting J-hook angle and no extravasation. We withdrew the ureteroscope back down the ureter, noting no evidence of any stones along the course of the ureter and no ureteral damage. The safety wire was backloaded through the scope. A 6Fr x28cm JJ stent was placed under fluoroscopic and visual guidance, making sure that the proximal and distal ends coil within the kidney and bladder respectfully. The cystoscope was used to drain the bladder and then removed. The patient tolerated the procedure well and there was no immediate complication. She was awoken from anesthesia and taken to the recovery room in stable condition. Dr. Jeaneth Lewis was present and scrubbed throughout entire procedure. DISPOSITION: To the PACU in good condition. Discharge home. Follow up to discuss ESWL in 2 weeks to break up ureteral stone followed by repeat URS after additional passive dilation, and possible ureteral straightening, with stent. Shawnee Torres MD, PGY-4       Staff:  I performed this procedure with the assistance of Dr. Nico Ring and Dr. Heath Watts. I agree with the documentation provided above as presented.   Bernie Mendoza MD

## 2020-03-04 NOTE — DISCHARGE INSTRUCTIONS
Patient armband removed and given to patient to take home. Patient was informed of the privacy risks if armband lost or stolen    Patient Education        Cystoscopy: What to Expect at [address] cystoscopy is a procedure that lets a doctor look inside of the bladder and the urethra. The urethra is the tube that carries urine from the bladder to outside the body. The doctor uses a thin, lighted tool called a cystoscope. Your bladder is filled with fluid. This stretches the bladder so that your doctor can look closely at the inside of your bladder. After the cystoscopy, your urethra may be sore at first, and it may burn when you urinate for the first few days after the procedure. You may feel the need to urinate more often, and your urine may be pink. These symptoms should get better in 1 or 2 days. You will probably be able to go back to most of your usual activities in 1 or 2 days. This care sheet gives you a general idea about how long it will take for you to recover. But each person recovers at a different pace. Follow the steps below to get better as quickly as possible. How can you care for yourself at home? Activity    · Rest when you feel tired. Getting enough sleep will help you recover.     · Try to walk each day. Start by walking a little more than you did the day before. Bit by bit, increase the amount you walk. Walking boosts blood flow and helps prevent pneumonia and constipation.     · Avoid strenuous activities, such as bicycle riding, jogging, weight lifting, or aerobic exercise, until your doctor says it is okay.     · Ask your doctor when you can drive again.     · Most people are able to return to work within 1 or 2 days after the procedure.     · You may shower and take baths as usual.     · Ask your doctor when it is okay for you to have sex. Diet    · You can eat your normal diet.  If your stomach is upset, try bland, low-fat foods like plain rice, broiled chicken, toast, and yogurt.     · Drink plenty of fluids (unless your doctor tells you not to). Medicines    · Take pain medicines exactly as directed. ? If the doctor gave you a prescription medicine for pain, take it as prescribed. ? If you are not taking a prescription pain medicine, ask your doctor if you can take an over-the-counter medicine.     · If you think your pain medicine is making you sick to your stomach:  ? Take your medicine after meals (unless your doctor has told you not to). ? Ask your doctor for a different pain medicine.     · If your doctor prescribed antibiotics, take them as directed. Do not stop taking them just because you feel better. You need to take the full course of antibiotics. Follow-up care is a key part of your treatment and safety. Be sure to make and go to all appointments, and call your doctor if you are having problems. It's also a good idea to know your test results and keep a list of the medicines you take. When should you call for help? Call 911 anytime you think you may need emergency care. For example, call if:    · You passed out (lost consciousness).     · You have severe trouble breathing.     · You have sudden chest pain and shortness of breath, or you cough up blood.     · You have severe belly pain.    Call your doctor now or seek immediate medical care if:    · You are sick to your stomach or cannot keep fluids down.     · Your urine is still red or you see blood clots after you have urinated several times.     · You have trouble passing urine or stool, especially if you have pain or swelling in your lower belly.     · You have signs of a blood clot, such as:  ? Pain in your calf, back of the knee, thigh, or groin. ? Redness and swelling in your leg or groin.     · You develop a fever or severe chills.     · You have pain in your back just below your rib cage.  This is called flank pain.    Watch closely for changes in your health, and be sure to contact your doctor if:    · You have pain or burning when you urinate. A burning feeling is normal for a day or two after the test, but call if it does not get better.     · You have a frequent urge to urinate but can pass only small amounts of urine.     · Your urine is pink, red, or cloudy, or smells bad. It is normal for the urine to have a pinkish color for a few days after the test, but call if it does not get better. Where can you learn more? Go to http://leandro-michael.info/. Enter X009 in the search box to learn more about \"Cystoscopy: What to Expect at Home. \"  Current as of: December 19, 2018  Content Version: 12.2  © 1214-6547 [employer]. Care instructions adapted under license by [employer] (which disclaims liability or warranty for this information). If you have questions about a medical condition or this instruction, always ask your healthcare professional. [place] [address] any warranty or liability for your use of this information. Patient Education        Lithotripsy: What to Expect at Home  Your Recovery    Lithotripsy is a way to treat kidney stones without surgery. It is also called extracorporeal shock wave lithotripsy, or ESWL. This treatment uses sound waves to break kidney stones into tiny pieces. These pieces can then pass out of the body in the urine. You may have a small amount of blood in your urine after this treatment. Your urine may be slightly pink or reddish. The blood in the urine often goes away after 2 days. You may have a plastic tube inside one of your ureters. Ureters are the tubes that connect the kidneys to the bladder. The plastic tube is called a stent. It takes urine from your kidney to your bladder. This lets the stone pass more easily. Your doctor may remove the stent in about a week or two. This care sheet gives you a general idea about how long it will take for you to recover.  But each person recovers at a different pace. Follow the steps below to feel better as quickly as possible. How can you care for yourself at home? Activity    · Rest as much as you need to after you go home.     · You may do your regular activities. But avoid hard exercise or sports for a week. Wait until there is no blood in your urine and the stent is out. Diet    · You can eat your normal diet.     · Drink plenty of fluids, enough so that your urine is light yellow or clear like water. If you have kidney, heart, or liver disease and have to limit fluids, talk with your doctor before you increase the amount of fluids you drink. Medicines    · Your doctor will tell you if and when you can restart your medicines. He or she will also give you instructions about taking any new medicines.     · If you take blood thinners, such as warfarin (Coumadin), clopidogrel (Plavix), or aspirin, be sure to talk to your doctor. He or she will tell you if and when to start taking those medicines again. Make sure that you understand exactly what your doctor wants you to do.     · Be safe with medicines. Read and follow all instructions on the label. ? If the doctor gave you a prescription medicine for pain, take it as prescribed. ? If you are not taking a prescription pain medicine, ask your doctor if you can take acetaminophen (Tylenol). Do not take ibuprofen (Advil, Motrin) or naproxen (Aleve), or similar medicines unless your doctor tells you to. ? Do not take two or more pain medicines at the same time unless the doctor told you to. Many pain medicines have acetaminophen, which is Tylenol. Too much acetaminophen (Tylenol) can be harmful. Other instructions    · Urinate through the strainer the doctor gives you. Save any stone pieces, including those that look like sand or gravel. Take these to your doctor. This will help your doctor find the cause of your stones. Follow-up care is a key part of your treatment and safety.  Be sure to make and go to all appointments, and call your doctor if you are having problems. It's also a good idea to know your test results and keep a list of the medicines you take. When should you call for help? Call 911 anytime you think you may need emergency care. For example, call if:    · You passed out (lost consciousness).     · You have chest pain, are short of breath, or cough up blood.    Call your doctor now or seek immediate medical care if:    · You have pain that does not get better after you take pain medicine.     · You have new or more blood clots in your urine. (It is normal for the urine to be pink for a few days.)     · You cannot urinate.     · You have symptoms of a urinary tract infection. These may include:  ? Pain or burning when you urinate. ? A frequent need to urinate without being able to pass much urine. ? Pain in the flank, which is just below the rib cage and above the waist on either side of the back. ? Blood in the urine. ? A fever.     · You are sick to your stomach or cannot drink fluids.     · You have signs of a blood clot in your leg (called a deep vein thrombosis), such as:  ? Pain in the calf, back of the knee, thigh, or groin. ? Redness and swelling in your leg.    Watch closely for any changes in your health, and be sure to contact your doctor if you have any problems. Where can you learn more? Go to http://leandro-michael.info/. Enter A017 in the search box to learn more about \"Lithotripsy: What to Expect at Home. \"  Current as of: October 31, 2018  Content Version: 12.2  © 6802-6463 [employer]. Care instructions adapted under license by [employer] (which disclaims liability or warranty for this information). If you have questions about a medical condition or this instruction, always ask your healthcare professional. [address] any warranty or liability for your use of this information.

## 2020-03-04 NOTE — H&P
Carol Deng  1940     ASSESSMENT:         Encounter Diagnoses       ICD-10-CM ICD-9-CM   1. Kidney stone N20.0 592.0       1. Multiple left sided nephrolithiasis, largest measuring 1.5cm by by CT abd/pelvis wo contrast 8/2015. Previously seen on CT abd/pelvis 8/2013. Seen on KUB 3/7/17, renal US 3/13/17 and CT 7/23/18   -KUB 10/18/19 revealed two calcifications in mid left kinky ureter, measuring 5-6mm each. Left upper pole 1 cm calcification and left lower pole 1cm calcification. No right renal calcifications     2. History of urolithiasis: s/p left complex ureteroscopy with laserlitho 9/2013, left ureteroscopy with laserlitho 11/2013, left ESWL 5/2015, and left ureteroscopy with laserlitho 5/2015. Stone analysis: 25% calcium oxalate, 40% calcium phosphate, and 35% calcium hydrogen phosphate. Currently on Urocit-K 30 mEq TID, chlorthalidone 25 mg TID, and spironolactone 50 mg daily     3. Gross hematuria. Cysto 8/14/18: Infammatory polyps of the urethra, and atrophic urethritis. Urine cytology 8/14/18: Negative for malignancy. CTU 7/23/18:  Multiple bilateral nonobstructing renal calculi are evident. No suspicious masses or hydronephrosis     4. 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 On Premain cream Mon and Fri.            PLAN:    - Plan to culture the urine  - Will plan left side ureteroscopy, possible retrograde pyelogram, laser lithotripsy for stone management and left ureteral stent placement at [facility]  - surgery scheduler will call to schedule  -Explained that size of stone may warrant multiple procedures  -I discussed the risks of cystoscopy, left retrograde pyelogram and leftt double J ureteral stent placement, left ureteroscopy,including bleeding, infection, injury to bladder, ureter, kidney, need for additional procedures, inability to place the stent, voiding c/o due to stent irritation including hematuria, frequency, urgency, and dysuria.  I discussed possible PCN (Percutaneous Nephrostomy Tube(s) placement if unable to pass stent past stone. All questions were answered. He verbalized understanding and desires to proceed.              Chief Complaint   Patient presents with   [address]      HISTORY OF PRESENT Malika Berrios is a 70 y.o. female who presents today in follow for urolithiasis.      History of malaise in December and was seen in the ED on 12/17/2019 at [place] [address] and placed on antibiotics for a UTI - Omnicef 300 mg BID x 10 days. Had improvement after antibiotics but was still having pain at the lower back.      Today:  Having no discomfort, no CVA tenderness, no fever, No Dysuria, no visible blood in the urine, passed a small stone a week ago and brought today for evaluation.     The CT scan on 1/07/2020 has been reviewed by Dr. Damian Goodwin and will proceed with ureteroscopic surgery to address the ureteral stone visible in the tethered ureter on the left side.     Taking no pain medication at this time, no Flomax but still taking Uricet K TID.      UA today with 2+ blood and 3+ leukocytes today        Past History:  She takes Urocit-K 30 mEq TID, chlorthalidone 25 mg TID, and spironolactone 50 mg daily. She is followed by her nephrologist, Dr. John Jaimes. Patient notes recently Dr. John Jaimes has decreased her chlorthalidone. She has an upcoming appt with him next month. She denies any stone attacks in the interim and is feeling well today. Denies any flank pain. Denies any f/c/n/v. Notes if she is going to have a procedure she would like it in the winter.            Past Medical History:   Diagnosis Date    Achilles tendinitis      Atrophic urethritis      Back pain      Bone loss      Bursitis      Cigarette smoker      Degenerative arthritis of hip      Depression      Diverticular disease      Fibrocystic breast disease      Gallbladder problem       sludge in gall bladder    Gross hematuria      Hemorrhoids      Hepatosplenomegaly      Hernia    History of kidney stones      Hypercholesteremia      Kidney stone       h/o multiple kidney stones    Left Achilles tendinitis      Lumbar facet arthropathy      Osteopenia      Urinary frequency              Past Surgical History:   Procedure Laterality Date    ABDOMEN SURGERY PROC UNLISTED   1960s/1980s     many yrs ago for kidney stones    HX CATARACT REMOVAL         LT eye, RT eye    HX COLONOSCOPY        HX HEENT         cataract surgery    HX LITHOTRIPSY   05/06/2015     DP, Dr. Valentin Garduno, Left ESWL    HX LITHOTRIPSY   2000    HX TONSILLECTOMY   1953    HX UROLOGICAL   09/2013     cysto, retrograde pyelogram and ureteroscopy, laser lithotripsy and double-J stent placement       Social History            Tobacco Use    Smoking status: Current Every Day Smoker       Packs/day: 1.00       Years: 30.00       Pack years: 30.00       Types: Cigarettes    Smokeless tobacco: Never Used    Tobacco comment: pt is now cutting down on number cig   Substance Use Topics    Alcohol use: Yes       Comment: rarely    Drug use: No            Allergies   Allergen Reactions    Sulfur Other (comments)       Wrong allergy    Sulfa (Sulfonamide Antibiotics) Rash            Family History   Problem Relation Age of Onset    Dementia Mother      Osteoporosis Mother      Alzheimer Mother      Cancer Father           Colon Cancer    Cancer Sister           Breast Cancer             Current Outpatient Medications   Medication Sig Dispense Refill    allopurinol (ZYLOPRIM) 300 mg tablet          alendronate (FOSAMAX) 35 mg tablet 35 mg.        chlorthalidone (HYGROTEN) 50 mg tablet Take  by mouth daily.        spironolactone (ALDACTONE) 25 mg tablet Take 50 mg by mouth daily.        DOCOSAHEXANOIC ACID/EPA (FISH OIL PO) Take  by mouth.        potassium citrate (UROCIT-K 10) 10 mEq TbER Take 30 mEq by mouth three (3) times daily (with meals).         fenofibrate nanocrystallized (TRICOR) 145 mg tablet Take by mouth daily.          folic acid-vit A8-QZF X97 (FOLTX) 2.5-25-2 mg tablet Take 1 Tab by mouth daily.          buPROPion SR (WELLBUTRIN SR) 150 mg SR tablet Take  by mouth daily. Indications: Smoking Cessation        simvastatin (ZOCOR) 40 mg tablet Take  by mouth nightly.          aspirin 81 mg tablet Take 81 mg by mouth daily. Per patient, stated that she already placed this medicine on hold since August 2013.          Any elements of the PMH, FH, SHx, ROS, or preliminary elements of the HPI that were entered by a medical assistant have been reviewed in full.     Review of Systems  Constitutional: Fever: No  Skin: Rash: No  HEENT: Hearing difficulty: No  Eyes: Blurred vision: No  Cardiovascular: Chest pain: No  Respiratory: Shortness of breath: No  Gastrointestinal: Nausea/vomiting: No  Musculoskeletal: Back pain: No  Neurological: Weakness: No  Psychological: Memory loss: No  Comments/additional findings:      PHYSICAL EXAM:  Visit Vitals  /68   Ht 5' 10\" (1.778 m)   Wt 206 lb (93.4 kg)   BMI 29.56 kg/m²      Constitutional: Well developed, well-nourished female in no acute distress. Respiratory: No respiratory distress or difficulties  Skin: No bruising or rashes. No petechia. Neuro/Psych:  Patient with appropriate affect.   Alert and oriented.       REVIEW OF LABS AND IMAGING:          Results for orders placed or performed in visit on 01/10/20   URINE C&S   Result Value Ref Range     FINAL REPORT Microbiology results (A)     AMB POC URINALYSIS DIP STICK AUTO W/O MICRO   Result Value Ref Range     Color (UA POC) Yellow       Clarity (UA POC) Clear       Glucose (UA POC) Negative Negative     Bilirubin (UA POC) Negative Negative     Ketones (UA POC) Negative Negative     Specific gravity (UA POC) 1.015 1.001 - 1.035     Blood (UA POC) 2+ Negative     pH (UA POC) 8.0 4.6 - 8.0     Protein (UA POC) Negative Negative     Urobilinogen (UA POC) 1 mg/dL 0.2 - 1     Nitrites (UA POC) Negative Negative   Leukocyte esterase (UA POC) 3+ Negative   AMB POC PVR, МАРИНА,POST-VOID RES,US,NON-IMAGING   Result Value Ref Range      cc         CT Scan abdomen and pelvis 1/07/2020:  IMPRESSION  1. Left mid ureter is similarly tethered at the caudal left pararenal fascia but there are new calcifications at this location. A few of the stones may be within the ureter but there is no upstream hydroureter. At least the largest calcification is suspected to be outside of the ureter and dystrophic. Bilateral nonobstructing nephrolithiasis otherwise. 2. The left kidney is dystrophic with lobulations and regions of cortical scarring and thickening, not well evaluated without IV contrast. The prior subcapsular hematoma has resolved. 3. Bilateral small hemorrhagic/proteinaceous cyst.  4. Mild colonic diverticulosis. 5. Hepatomegaly and mild steatosis. 6. Fibroid uterus with calcifications. Coarse cervical calcification. 7. Moderate atherosclerosis with likely multifocal regions of stenosis.       KUB 10/18/19  Impression:   Two calcifications in mid left kinky ureter, measuring 5-6mm each. Left upper pole 1 cm calcification and left lower pole 1cm calcification   Right hypertrophy but no obvious right renal calcifications  Fibroid uterus      CTU 7/23/18  IMPRESSION:     Multiple bilateral nonobstructing renal calculi are evident. No suspicious  masses or hydronephrosis     Calcified uterine leiomyoma     Chronic degenerative changes involving the hips, spine and SI joints     Multiple layering gallstones     Hepatic steatosis     FAHAD 7/5/2018: Impression:    Bilateral renal cysts. Bilateral non-obstructing stones. Left lobulated kidney. Non-fully distended bladder with bilateral urine jets. Volume = 46 cc's.     Renal US 3/13/2017   IMPRESSION:  1. Left renal calcification identified. No hydronephrosis is seen bilaterally.   2. Apparent asymmetric renal sizes which may be artifactual related to scanning  technique.     KUB 3/7/2017:  Impression:  Normal bowel gas pattern  16 x 4.6 mm calyceal stones in left upper pole  Left lower pole stones also seen  No ureteral stones  Calcified uterine fibroid     CT Abdomen and Pelvis 2/3/2015:   Right Kidney, Ureter: The right kidney demonstrates prominent  hydronephrosis. The right renal pelvis width is estimated at  about 6.0 cm. Additional tiny punctate intrarenal stones  identified in the right kidney. The right ureter is  asymmetrically dilated on the right down to the right distal  ureterovesical junction. The right distal ureteral vesicle  junction stone measures about 0.7 x 0.4 cm (image #79). More  proximal to the right ureterovesical junction stone is a less  distinct subtle calcific density (image #78), measuring 0.5 x 0.5  cm. This is still probably related to a distal ureteral stone  despite relatively decreased overall density compared to the more  well formed stone more inferiorly. Alternatively, precipitation  of calcium granules due to stasis of the urine may also be  considered. Left Kidney, Ureter: No evidence for left ureteral stone is  identified. The left ureter is of normal caliber. The left  renal pelvis is mildly prominent. The etiology for the renal  pelviectasis is unclear. There are multiple fairly prominent  internal stones in the left kidney the largest of the left  internal stones measures about 1.5 cm.      [facility] 8/10/2016  Urine Volume:                         4.44  SS CaOx                     6.58  Urine Calcium             424  Urine Oxalate              69  Urine Citrate                617  SS CaP                       2.17  24 Hour Urine pH        7.348  SS Uric Acid                0.02  Urine Uric Acid            0.943           Patient's BMI is out of the normal parameters.   Information about BMI was given and patient was advised to follow-up with their PCP for further management.     Raman Joya MD

## 2020-03-04 NOTE — PERIOP NOTES
Phase 2 Recovery Summary    Report received from Lorrie Barton:    03/04/20 1500 03/04/20 1505 03/04/20 1515 03/04/20 1520   BP: 120/64 113/65  105/58   Pulse: 70 71 66    Resp: 22 23 26    Temp:       SpO2: 97% 96% 96% 93%   Weight:       Height:           oriented to time, place, person and situation          Lines and Drains  Peripheral Intravenous Line:   Peripheral IV 03/04/20 Right Wrist (Active)   Site Assessment Clean, dry, & intact 3/4/2020  2:50 PM   Phlebitis Assessment 0 3/4/2020  2:50 PM   Infiltration Assessment 0 3/4/2020  2:50 PM   Dressing Status Clean, dry, & intact 3/4/2020  2:50 PM   Dressing Type Transparent 3/4/2020  2:50 PM   Hub Color/Line Status Pink; Infusing 3/4/2020  2:50 PM       Patient assisted to chair with minimal assist. Pateint tolerating liquids well. Patient's family at bedside. Discharge discussed with patient and family. No questions or concerns at this time. Patient had time to ask any questions. Pain at 0/10,    Discharge medications reviewed with patient and appropriate educational materials and side effects teaching were provided.     Patient discharged to home with       Ian Lewis RN

## 2020-03-04 NOTE — PERIOP NOTES
Pre-Op Summary    Pt arrived via car with family/friend and is oriented to time, place, person and situation. Patient with steady gait with none assistive devices. Visit Vitals  /75 (BP 1 Location: Left arm, BP Patient Position: Sitting)   Pulse 89   Temp 98.7 °F (37.1 °C)   Resp 18   Ht 5' 10\" (1.778 m)   Wt 95.8 kg (211 lb 3.2 oz)   SpO2 94%   BMI 30.30 kg/m²       Peripheral IV located on Right wrist .    Patients belongings are located with family. Patient's point of contact is her  Iram Dials  and their contact number is: [phone]. They will be in the waiting room. They are able to receive medication information. They will be their ride home.

## 2020-03-04 NOTE — ANESTHESIA POSTPROCEDURE EVALUATION
Procedure(s):  CYSTOSCOPY, left retrograde pyelogram, URETEROSCOPY, laser lithotripsy, left JJ stent placement. No value filed.     Anesthesia Post Evaluation      Multimodal analgesia: multimodal analgesia used between 6 hours prior to anesthesia start to PACU discharge  Patient location during evaluation: PACU  Patient participation: complete - patient participated  Level of consciousness: sleepy but conscious  Pain score: 3  Airway patency: patent  Cardiovascular status: acceptable  Respiratory status: acceptable  Hydration status: acceptable  Post anesthesia nausea and vomiting:  none      Vitals Value Taken Time   /62 3/4/2020  2:50 PM   Temp 36.2 °C (97.1 °F) 3/4/2020  2:50 PM   Pulse 69 3/4/2020  2:55 PM   Resp 14 3/4/2020  2:55 PM   SpO2 97 % 3/4/2020  2:55 PM

## 2020-04-20 ENCOUNTER — HOSPITAL ENCOUNTER (OUTPATIENT)
Dept: PREADMISSION TESTING | Age: 80
Discharge: HOME | End: 2020-04-20
Payer: MEDICARE

## 2020-04-20 LAB
ANION GAP SERPL CALC-SCNC: 5 MMOL/L (ref 3–18)
BUN SERPL-MCNC: 17 MG/DL (ref 7–18)
BUN/CREAT SERPL: 15 (ref 12–20)
CALCIUM SERPL-MCNC: 9.5 MG/DL (ref 8.5–10.1)
CHLORIDE SERPL-SCNC: 100 MMOL/L (ref 100–111)
CO2 SERPL-SCNC: 30 MMOL/L (ref 21–32)
CREAT SERPL-MCNC: 1.1 MG/DL (ref 0.6–1.3)
ERYTHROCYTE [DISTWIDTH] IN BLOOD BY AUTOMATED COUNT: 14.1 % (ref 11.6–14.5)
GLUCOSE SERPL-MCNC: 122 MG/DL (ref 74–99)
HCT VFR BLD AUTO: 41.6 % (ref 35–45)
HGB BLD-MCNC: 14.2 G/DL (ref 12–16)
MCH RBC QN AUTO: 34.5 PG (ref 24–34)
MCHC RBC AUTO-ENTMCNC: 34.1 G/DL (ref 31–37)
MCV RBC AUTO: 101 FL (ref 74–97)
PLATELET # BLD AUTO: 289 K/UL (ref 135–420)
PMV BLD AUTO: 9 FL (ref 9.2–11.8)
POTASSIUM SERPL-SCNC: 4.1 MMOL/L (ref 3.5–5.5)
RBC # BLD AUTO: 4.12 M/UL (ref 4.2–5.3)
SODIUM SERPL-SCNC: 135 MMOL/L (ref 136–145)
WBC # BLD AUTO: 9.1 K/UL (ref 4.6–13.2)

## 2020-04-20 PROCEDURE — 87186 SC STD MICRODIL/AGAR DIL: CPT

## 2020-04-20 PROCEDURE — 36415 COLL VENOUS BLD VENIPUNCTURE: CPT

## 2020-04-20 PROCEDURE — 87077 CULTURE AEROBIC IDENTIFY: CPT

## 2020-04-20 PROCEDURE — 80048 BASIC METABOLIC PNL TOTAL CA: CPT

## 2020-04-20 PROCEDURE — 87086 URINE CULTURE/COLONY COUNT: CPT

## 2020-04-20 PROCEDURE — 85027 COMPLETE CBC AUTOMATED: CPT

## 2020-04-23 LAB
BACTERIA SPEC CULT: ABNORMAL
CC UR VC: ABNORMAL
SERVICE CMNT-IMP: ABNORMAL

## 2020-04-29 ENCOUNTER — HOSPITAL ENCOUNTER (OUTPATIENT)
Dept: LAB | Age: 80
Discharge: HOME | End: 2020-04-29
Payer: MEDICARE

## 2020-04-29 PROCEDURE — 87086 URINE CULTURE/COLONY COUNT: CPT

## 2020-05-01 LAB
BACTERIA SPEC CULT: NORMAL
CC UR VC: NORMAL
SERVICE CMNT-IMP: NORMAL

## 2020-05-04 NOTE — PERIOP NOTES
PAT - SURGICAL PRE-ADMISSION INSTRUCTIONS    NAME:  Jonelle Silva                                                          TODAY'S DATE:  5/4/2020    SURGERY DATE:  5/6/2020                                  SURGERY ARRIVAL TIME:  TBV    1. Do NOT eat or drink anything, including candy or gum, after MIDNIGHT on 05/05/2020 , unless you have specific instructions from your Surgeon or Anesthesia Provider to do so. 2. No smoking on the day of surgery. 3. No alcohol 24 hours prior to the day of surgery. 4. No recreational drugs for one week prior to the day of surgery. 5. Leave all valuables, including money/purse, at home. 6. Remove all jewelry, nail polish, makeup (including mascara); no lotions, powders, deodorant, or perfume/cologne/after shave. 7. Glasses/Contact lenses and Dentures may be worn to the hospital.  They will be removed prior to surgery. 8. Call your doctor if symptoms of a cold or illness develop within 24 ours prior to surgery. 9. AN ADULT MUST DRIVE YOU HOME AFTER OUTPATIENT SURGERY. 10. If you are having an OUTPATIENT procedure, please make arrangements for a responsible adult to be with you for 24 hours after your surgery. 11. If you are admitted to the hospital, you will be assigned to a bed after surgery is complete. Normally a family member will not be able to see you until you are in your assigned bed. 15. Family is encouraged to accompany you to the hospital.  We ask visitors in the treatment area to be limited to ONE person at a time to ensure patient privacy. EXCEPTIONS WILL BE MADE AS NEEDED. 15. Children under 12 are discouraged from entering the treatment area and need to be supervised by an adult when in the waiting room. Special Instructions:    NONE. Patient Prep:    shower with anti-bacterial soap    These surgical instructions were reviewed with Ike Beck during the PAT phone call. Directions:   On the morning of surgery, please go to the [facility]. Enter the building from the [facility] entrance, 1st floor (next to the Emergency Room entrance). Take the elevator to the 2nd floor. Sign in at the Registration Desk.     If you have any questions and/or concerns, please do not hesitate to call:  (Prior to the day of surgery)  [facility] unit:  [phone]  (Day of surgery)  [facility] unit:  [phone]

## 2020-05-05 ENCOUNTER — ANESTHESIA EVENT (OUTPATIENT)
Dept: SURGERY | Age: 80
End: 2020-05-05
Payer: MEDICARE

## 2020-05-06 ENCOUNTER — APPOINTMENT (OUTPATIENT)
Dept: GENERAL RADIOLOGY | Age: 80
End: 2020-05-06
Attending: UROLOGY
Payer: MEDICARE

## 2020-05-06 ENCOUNTER — HOSPITAL ENCOUNTER (OUTPATIENT)
Age: 80
Setting detail: OUTPATIENT SURGERY
Discharge: HOME | End: 2020-05-06
Attending: UROLOGY | Admitting: UROLOGY
Payer: MEDICARE

## 2020-05-06 ENCOUNTER — ANESTHESIA (OUTPATIENT)
Dept: SURGERY | Age: 80
End: 2020-05-06
Payer: MEDICARE

## 2020-05-06 VITALS
DIASTOLIC BLOOD PRESSURE: 50 MMHG | BODY MASS INDEX: 30.84 KG/M2 | TEMPERATURE: 97.4 F | HEIGHT: 70 IN | HEART RATE: 71 BPM | OXYGEN SATURATION: 95 % | SYSTOLIC BLOOD PRESSURE: 105 MMHG | RESPIRATION RATE: 20 BRPM | WEIGHT: 215.4 LBS

## 2020-05-06 DIAGNOSIS — N20.0 KIDNEY STONES: Primary | ICD-10-CM

## 2020-05-06 PROCEDURE — 74011000250 HC RX REV CODE- 250: Performed by: UROLOGY

## 2020-05-06 PROCEDURE — C1726 CATH, BAL DIL, NON-VASCULAR: HCPCS | Performed by: UROLOGY

## 2020-05-06 PROCEDURE — 77030012961 HC IRR KT CYSTO/TUR ICUM -A: Performed by: UROLOGY

## 2020-05-06 PROCEDURE — 74011250636 HC RX REV CODE- 250/636: Performed by: NURSE ANESTHETIST, CERTIFIED REGISTERED

## 2020-05-06 PROCEDURE — 74011000250 HC RX REV CODE- 250: Performed by: NURSE ANESTHETIST, CERTIFIED REGISTERED

## 2020-05-06 PROCEDURE — C2617 STENT, NON-COR, TEM W/O DEL: HCPCS | Performed by: UROLOGY

## 2020-05-06 PROCEDURE — 74011636320 HC RX REV CODE- 636/320: Performed by: UROLOGY

## 2020-05-06 PROCEDURE — 74420 UROGRAPHY RTRGR +-KUB: CPT

## 2020-05-06 PROCEDURE — 76010000131 HC OR TIME 2 TO 2.5 HR: Performed by: UROLOGY

## 2020-05-06 PROCEDURE — 76210000006 HC OR PH I REC 0.5 TO 1 HR: Performed by: UROLOGY

## 2020-05-06 PROCEDURE — 74011000272 HC RX REV CODE- 272: Performed by: UROLOGY

## 2020-05-06 PROCEDURE — 77030018836 HC SOL IRR NACL ICUM -A: Performed by: UROLOGY

## 2020-05-06 PROCEDURE — 77030008683 HC TU ET CUF COVD -A: Performed by: ANESTHESIOLOGY

## 2020-05-06 PROCEDURE — C1769 GUIDE WIRE: HCPCS | Performed by: UROLOGY

## 2020-05-06 PROCEDURE — 76060000035 HC ANESTHESIA 2 TO 2.5 HR: Performed by: UROLOGY

## 2020-05-06 PROCEDURE — 74011250636 HC RX REV CODE- 250/636: Performed by: UROLOGY

## 2020-05-06 PROCEDURE — 77030018846 HC SOL IRR STRL H20 ICUM -A: Performed by: UROLOGY

## 2020-05-06 PROCEDURE — 76210000029 HC REC RM PH II 5 TO 5.5 HR: Performed by: UROLOGY

## 2020-05-06 PROCEDURE — 74011250637 HC RX REV CODE- 250/637: Performed by: UROLOGY

## 2020-05-06 PROCEDURE — C1758 CATHETER, URETERAL: HCPCS | Performed by: UROLOGY

## 2020-05-06 PROCEDURE — 77030032490 HC SLV COMPR SCD KNE COVD -B: Performed by: UROLOGY

## 2020-05-06 PROCEDURE — 77030006974 HC BSKT URET RTVR BSC -C: Performed by: UROLOGY

## 2020-05-06 PROCEDURE — 77030013079 HC BLNKT BAIR HGGR 3M -A: Performed by: ANESTHESIOLOGY

## 2020-05-06 PROCEDURE — 77030018842 HC SOL IRR SOD CL 9% BAXT -A: Performed by: UROLOGY

## 2020-05-06 DEVICE — INLAY OPTIMA URETERAL STENT W/O GUIDEWIRE
Type: IMPLANTABLE DEVICE | Site: URETER | Status: FUNCTIONAL
Brand: BARD® INLAY OPTIMA® URETERAL STENT

## 2020-05-06 RX ORDER — LIDOCAINE HYDROCHLORIDE 10 MG/ML
0.1 INJECTION, SOLUTION EPIDURAL; INFILTRATION; INTRACAUDAL; PERINEURAL AS NEEDED
Status: DISCONTINUED | OUTPATIENT
Start: 2020-05-06 | End: 2020-05-06 | Stop reason: HOSPADM

## 2020-05-06 RX ORDER — CEFAZOLIN SODIUM 2 G/50ML
2 SOLUTION INTRAVENOUS
Status: COMPLETED | OUTPATIENT
Start: 2020-05-06 | End: 2020-05-06

## 2020-05-06 RX ORDER — SODIUM CHLORIDE 0.9 G/100ML
IRRIGANT IRRIGATION AS NEEDED
Status: DISCONTINUED | OUTPATIENT
Start: 2020-05-06 | End: 2020-05-06 | Stop reason: HOSPADM

## 2020-05-06 RX ORDER — DEXAMETHASONE SODIUM PHOSPHATE 4 MG/ML
INJECTION, SOLUTION INTRA-ARTICULAR; INTRALESIONAL; INTRAMUSCULAR; INTRAVENOUS; SOFT TISSUE AS NEEDED
Status: DISCONTINUED | OUTPATIENT
Start: 2020-05-06 | End: 2020-05-06 | Stop reason: HOSPADM

## 2020-05-06 RX ORDER — ONDANSETRON 2 MG/ML
INJECTION INTRAMUSCULAR; INTRAVENOUS AS NEEDED
Status: DISCONTINUED | OUTPATIENT
Start: 2020-05-06 | End: 2020-05-06 | Stop reason: HOSPADM

## 2020-05-06 RX ORDER — LIDOCAINE HYDROCHLORIDE 20 MG/ML
INJECTION, SOLUTION EPIDURAL; INFILTRATION; INTRACAUDAL; PERINEURAL AS NEEDED
Status: DISCONTINUED | OUTPATIENT
Start: 2020-05-06 | End: 2020-05-06 | Stop reason: HOSPADM

## 2020-05-06 RX ORDER — ALBUTEROL SULFATE 0.83 MG/ML
2.5 SOLUTION RESPIRATORY (INHALATION) AS NEEDED
Status: DISCONTINUED | OUTPATIENT
Start: 2020-05-06 | End: 2020-05-06 | Stop reason: HOSPADM

## 2020-05-06 RX ORDER — VECURONIUM BROMIDE FOR INJECTION 1 MG/ML
INJECTION, POWDER, LYOPHILIZED, FOR SOLUTION INTRAVENOUS AS NEEDED
Status: DISCONTINUED | OUTPATIENT
Start: 2020-05-06 | End: 2020-05-06 | Stop reason: HOSPADM

## 2020-05-06 RX ORDER — FENTANYL CITRATE 50 UG/ML
50 INJECTION, SOLUTION INTRAMUSCULAR; INTRAVENOUS
Status: DISCONTINUED | OUTPATIENT
Start: 2020-05-06 | End: 2020-05-06 | Stop reason: HOSPADM

## 2020-05-06 RX ORDER — SODIUM CHLORIDE, SODIUM LACTATE, POTASSIUM CHLORIDE, CALCIUM CHLORIDE 600; 310; 30; 20 MG/100ML; MG/100ML; MG/100ML; MG/100ML
50 INJECTION, SOLUTION INTRAVENOUS CONTINUOUS
Status: DISCONTINUED | OUTPATIENT
Start: 2020-05-06 | End: 2020-05-06 | Stop reason: HOSPADM

## 2020-05-06 RX ORDER — DOCUSATE SODIUM 100 MG/1
100 CAPSULE, LIQUID FILLED ORAL 2 TIMES DAILY
Qty: 60 CAP | Refills: 2 | Status: SHIPPED | OUTPATIENT
Start: 2020-05-06 | End: 2020-06-05

## 2020-05-06 RX ORDER — GENTAMICIN SULFATE 40 MG/ML
INJECTION, SOLUTION INTRAMUSCULAR; INTRAVENOUS AS NEEDED
Status: DISCONTINUED | OUTPATIENT
Start: 2020-05-06 | End: 2020-05-06 | Stop reason: HOSPADM

## 2020-05-06 RX ORDER — FENTANYL CITRATE 50 UG/ML
INJECTION, SOLUTION INTRAMUSCULAR; INTRAVENOUS AS NEEDED
Status: DISCONTINUED | OUTPATIENT
Start: 2020-05-06 | End: 2020-05-06 | Stop reason: HOSPADM

## 2020-05-06 RX ORDER — OXYCODONE AND ACETAMINOPHEN 5; 325 MG/1; MG/1
1 TABLET ORAL
Status: COMPLETED | OUTPATIENT
Start: 2020-05-06 | End: 2020-05-06

## 2020-05-06 RX ORDER — SODIUM CHLORIDE, SODIUM LACTATE, POTASSIUM CHLORIDE, CALCIUM CHLORIDE 600; 310; 30; 20 MG/100ML; MG/100ML; MG/100ML; MG/100ML
75 INJECTION, SOLUTION INTRAVENOUS CONTINUOUS
Status: DISCONTINUED | OUTPATIENT
Start: 2020-05-06 | End: 2020-05-06 | Stop reason: HOSPADM

## 2020-05-06 RX ORDER — SUCCINYLCHOLINE CHLORIDE 20 MG/ML
INJECTION INTRAMUSCULAR; INTRAVENOUS AS NEEDED
Status: DISCONTINUED | OUTPATIENT
Start: 2020-05-06 | End: 2020-05-06 | Stop reason: HOSPADM

## 2020-05-06 RX ORDER — OXYCODONE AND ACETAMINOPHEN 5; 325 MG/1; MG/1
1 TABLET ORAL
Qty: 10 TAB | Refills: 0 | Status: SHIPPED | OUTPATIENT
Start: 2020-05-06 | End: 2020-05-09

## 2020-05-06 RX ORDER — NEOSTIGMINE METHYLSULFATE 1 MG/ML
INJECTION, SOLUTION INTRAVENOUS AS NEEDED
Status: DISCONTINUED | OUTPATIENT
Start: 2020-05-06 | End: 2020-05-06 | Stop reason: HOSPADM

## 2020-05-06 RX ORDER — HYDROMORPHONE HYDROCHLORIDE 1 MG/ML
0.5 INJECTION, SOLUTION INTRAMUSCULAR; INTRAVENOUS; SUBCUTANEOUS AS NEEDED
Status: DISCONTINUED | OUTPATIENT
Start: 2020-05-06 | End: 2020-05-06 | Stop reason: HOSPADM

## 2020-05-06 RX ORDER — NALOXONE HYDROCHLORIDE 0.4 MG/ML
0.04 INJECTION, SOLUTION INTRAMUSCULAR; INTRAVENOUS; SUBCUTANEOUS AS NEEDED
Status: DISCONTINUED | OUTPATIENT
Start: 2020-05-06 | End: 2020-05-06 | Stop reason: DRUGHIGH

## 2020-05-06 RX ORDER — ONDANSETRON 2 MG/ML
4 INJECTION INTRAMUSCULAR; INTRAVENOUS ONCE
Status: COMPLETED | OUTPATIENT
Start: 2020-05-06 | End: 2020-05-06

## 2020-05-06 RX ORDER — PROPOFOL 10 MG/ML
INJECTION, EMULSION INTRAVENOUS AS NEEDED
Status: DISCONTINUED | OUTPATIENT
Start: 2020-05-06 | End: 2020-05-06 | Stop reason: HOSPADM

## 2020-05-06 RX ORDER — NALOXONE HYDROCHLORIDE 0.4 MG/ML
0.4 INJECTION, SOLUTION INTRAMUSCULAR; INTRAVENOUS; SUBCUTANEOUS AS NEEDED
Status: DISCONTINUED | OUTPATIENT
Start: 2020-05-06 | End: 2020-05-06 | Stop reason: HOSPADM

## 2020-05-06 RX ORDER — GLYCOPYRROLATE 0.2 MG/ML
INJECTION INTRAMUSCULAR; INTRAVENOUS AS NEEDED
Status: DISCONTINUED | OUTPATIENT
Start: 2020-05-06 | End: 2020-05-06 | Stop reason: HOSPADM

## 2020-05-06 RX ORDER — DIPHENHYDRAMINE HYDROCHLORIDE 50 MG/ML
12.5 INJECTION, SOLUTION INTRAMUSCULAR; INTRAVENOUS
Status: DISCONTINUED | OUTPATIENT
Start: 2020-05-06 | End: 2020-05-06 | Stop reason: HOSPADM

## 2020-05-06 RX ADMIN — FENTANYL CITRATE 100 MCG: 50 INJECTION, SOLUTION INTRAMUSCULAR; INTRAVENOUS at 07:50

## 2020-05-06 RX ADMIN — ONDANSETRON 4 MG: 2 SOLUTION INTRAMUSCULAR; INTRAVENOUS at 07:46

## 2020-05-06 RX ADMIN — HYDROMORPHONE HYDROCHLORIDE 0.5 MG: 1 INJECTION, SOLUTION INTRAMUSCULAR; INTRAVENOUS; SUBCUTANEOUS at 10:15

## 2020-05-06 RX ADMIN — DEXAMETHASONE SODIUM PHOSPHATE 4 MG: 4 INJECTION, SOLUTION INTRA-ARTICULAR; INTRALESIONAL; INTRAMUSCULAR; INTRAVENOUS; SOFT TISSUE at 08:02

## 2020-05-06 RX ADMIN — GLYCOPYRROLATE 0.4 MG: 0.2 INJECTION INTRAMUSCULAR; INTRAVENOUS at 09:34

## 2020-05-06 RX ADMIN — SODIUM CHLORIDE, SODIUM LACTATE, POTASSIUM CHLORIDE, AND CALCIUM CHLORIDE 75 ML/HR: 600; 310; 30; 20 INJECTION, SOLUTION INTRAVENOUS at 06:57

## 2020-05-06 RX ADMIN — OXYCODONE HYDROCHLORIDE AND ACETAMINOPHEN 1 TABLET: 5; 325 TABLET ORAL at 12:34

## 2020-05-06 RX ADMIN — ONDANSETRON 4 MG: 2 INJECTION INTRAMUSCULAR; INTRAVENOUS at 11:05

## 2020-05-06 RX ADMIN — SUCCINYLCHOLINE CHLORIDE 180 MG: 20 INJECTION, SOLUTION INTRAMUSCULAR; INTRAVENOUS at 07:50

## 2020-05-06 RX ADMIN — VECURONIUM BROMIDE FOR INJECTION 2 MG: 1 INJECTION, POWDER, LYOPHILIZED, FOR SOLUTION INTRAVENOUS at 08:31

## 2020-05-06 RX ADMIN — FENTANYL CITRATE 50 MCG: 50 INJECTION, SOLUTION INTRAMUSCULAR; INTRAVENOUS at 09:51

## 2020-05-06 RX ADMIN — CEFAZOLIN 2 G: 10 INJECTION, POWDER, FOR SOLUTION INTRAVENOUS at 07:46

## 2020-05-06 RX ADMIN — PROPOFOL 150 MG: 10 INJECTION, EMULSION INTRAVENOUS at 07:50

## 2020-05-06 RX ADMIN — GLYCOPYRROLATE 0.2 MG: 0.2 INJECTION INTRAMUSCULAR; INTRAVENOUS at 07:46

## 2020-05-06 RX ADMIN — SODIUM CHLORIDE, SODIUM LACTATE, POTASSIUM CHLORIDE, AND CALCIUM CHLORIDE: 600; 310; 30; 20 INJECTION, SOLUTION INTRAVENOUS at 07:46

## 2020-05-06 RX ADMIN — FENTANYL CITRATE 50 MCG: 50 INJECTION, SOLUTION INTRAMUSCULAR; INTRAVENOUS at 09:44

## 2020-05-06 RX ADMIN — Medication 3 MG: at 09:35

## 2020-05-06 RX ADMIN — LIDOCAINE HYDROCHLORIDE 100 MG: 20 INJECTION, SOLUTION INTRAVENOUS at 07:50

## 2020-05-06 NOTE — DISCHARGE INSTRUCTIONS
Patient Education        Cystoscopy: What to Expect at [facility]    A cystoscopy is a procedure that lets a doctor look inside of the bladder and the urethra. The urethra is the tube that carries urine from the bladder to outside the body. The doctor uses a thin, lighted tool called a cystoscope. Your bladder is filled with fluid. This stretches the bladder so that your doctor can look closely at the inside of your bladder. After the cystoscopy, your urethra may be sore at first, and it may burn when you urinate for the first few days after the procedure. You may feel the need to urinate more often, and your urine may be pink. These symptoms should get better in 1 or 2 days. You will probably be able to go back to most of your usual activities in 1 or 2 days. This care sheet gives you a general idea about how long it will take for you to recover. But each person recovers at a different pace. Follow the steps below to get better as quickly as possible. How can you care for yourself at home? Activity    · Rest when you feel tired. Getting enough sleep will help you recover.     · Try to walk each day. Start by walking a little more than you did the day before. Bit by bit, increase the amount you walk. Walking boosts blood flow and helps prevent pneumonia and constipation.     · Avoid strenuous activities, such as bicycle riding, jogging, weight lifting, or aerobic exercise, until your doctor says it is okay.     · Ask your doctor when you can drive again.     · Most people are able to return to work within 1 or 2 days after the procedure.     · You may shower and take baths as usual.     · Ask your doctor when it is okay for you to have sex. Diet    · You can eat your normal diet. If your stomach is upset, try bland, low-fat foods like plain rice, broiled chicken, toast, and yogurt.     · Drink plenty of fluids (unless your doctor tells you not to).    Medicines    · Take pain medicines exactly as directed. ? If the doctor gave you a prescription medicine for pain, take it as prescribed. ? If you are not taking a prescription pain medicine, ask your doctor if you can take an over-the-counter medicine.     · If you think your pain medicine is making you sick to your stomach:  ? Take your medicine after meals (unless your doctor has told you not to). ? Ask your doctor for a different pain medicine.     · If your doctor prescribed antibiotics, take them as directed. Do not stop taking them just because you feel better. You need to take the full course of antibiotics. Follow-up care is a key part of your treatment and safety. Be sure to make and go to all appointments, and call your doctor if you are having problems. It's also a good idea to know your test results and keep a list of the medicines you take. When should you call for help? Call 911 anytime you think you may need emergency care. For example, call if:    · You passed out (lost consciousness).     · You have severe trouble breathing.     · You have sudden chest pain and shortness of breath, or you cough up blood.     · You have severe belly pain.    Call your doctor now or seek immediate medical care if:    · You are sick to your stomach or cannot keep fluids down.     · Your urine is still red or you see blood clots after you have urinated several times.     · You have trouble passing urine or stool, especially if you have pain or swelling in your lower belly.     · You have signs of a blood clot, such as:  ? Pain in your calf, back of the knee, thigh, or groin. ? Redness and swelling in your leg or groin.     · You develop a fever or severe chills.     · You have pain in your back just below your rib cage. This is called flank pain.    Watch closely for changes in your health, and be sure to contact your doctor if:    · You have pain or burning when you urinate.  A burning feeling is normal for a day or two after the test, but call if it does not get better.     · You have a frequent urge to urinate but can pass only small amounts of urine.     · Your urine is pink, red, or cloudy, or smells bad. It is normal for the urine to have a pinkish color for a few days after the test, but call if it does not get better. Where can you learn more? Go to http://leandro-michael.info/  Enter C842 in the search box to learn more about \"Cystoscopy: What to Expect at Home. \"  Current as of: August 21, 2019Content Version: 12.4  © 4038-6952 [employer]. Care instructions adapted under license by [employer] (which disclaims liability or warranty for this information). If you have questions about a medical condition or this instruction, always ask your healthcare professional. [address] any warranty or liability for your use of this information. DISCHARGE SUMMARY from Nurse    PATIENT INSTRUCTIONS:    After general anesthesia or intravenous sedation, for 24 hours or while taking prescription Narcotics:  · Limit your activities  · Do not drive and operate hazardous machinery  · Do not make important personal or business decisions  · Do  not drink alcoholic beverages  · If you have not urinated within 8 hours after discharge, please contact your surgeon on call. Report the following to your surgeon:  · Excessive pain, swelling, redness or odor of or around the surgical area  · Temperature over 100.5  · Nausea and vomiting lasting longer than 4 hours or if unable to take medications  · Any signs of decreased circulation or nerve impairment to extremity: change in color, persistent  numbness, tingling, coldness or increase pain  · Any questions        The discharge information has been reviewed with the patient. The patient verbalized understanding.   Discharge medications reviewed with the patient and appropriate educational materials and side effects teaching were provided. ___________________________________________________________________________________________________________________________________    Patient armband removed and shredded  Patient Education   Learning About Coronavirus [phone])  Coronavirus [phone]): Overview  What is coronavirus (WOPFE-44)? The coronavirus disease (COVID-19) is caused by a virus. It is an illness that was first found in [place], [place], in December 2019. It has since spread worldwide. The virus can cause fever, cough, and trouble breathing. In severe cases, it can cause pneumonia and make it hard to breathe without help. It can cause death. Coronaviruses are a large group of viruses. They cause the common cold. They also cause more serious illnesses like Middle East respiratory syndrome (MERS) and severe acute respiratory syndrome (SARS). COVID-19 is caused by a novel coronavirus. That means it's a new type that has not been seen in people before. This virus spreads person-to-person through droplets from coughing and sneezing. It can also spread when you are close to someone who is infected. And it can spread when you touch something that has the virus on it, such as a doorknob or a tabletop. What can you do to protect yourself from coronavirus (COVID-19)? The best way to protect yourself from getting sick is to:  · Avoid areas where there is an outbreak. · Avoid contact with people who may be infected. · Wash your hands often with soap or alcohol-based hand sanitizers. · Avoid crowds and try to stay at least 6 feet away from other people. · Wash your hands often, especially after you cough or sneeze. Use soap and water, and scrub for at least 20 seconds. If soap and water aren't available, use an alcohol-based hand . · Avoid touching your mouth, nose, and eyes. What can you do to avoid spreading the virus to others?   To help avoid spreading the virus to others:  · Cover your mouth with a tissue when you cough or sneeze. Then throw the tissue in the trash. · Use a disinfectant to clean things that you touch often. · Stay home if you are sick or have been exposed to the virus. Don't go to school, work, or public areas. And don't use public transportation. · If you are sick:  ? Leave your home only if you need to get medical care. But call the doctor's office first so they know you're coming. And wear a face mask, if you have one.  ? If you have a face mask, wear it whenever you're around other people. It can help stop the spread of the virus when you cough or sneeze. ? Clean and disinfect your home every day. Use household  and disinfectant wipes or sprays. Take special care to clean things that you grab with your hands. These include doorknobs, remote controls, phones, and handles on your refrigerator and microwave. And don't forget countertops, tabletops, bathrooms, and computer keyboards. When to call for help  Call 911 anytime you think you may need emergency care. For example, call if:  · You have severe trouble breathing. (You can't talk at all.)  · You have constant chest pain or pressure. · You are severely dizzy or lightheaded. · You are confused or can't think clearly. · Your face and lips have a blue color. · You pass out (lose consciousness) or are very hard to wake up. Call your doctor now if you develop symptoms such as:  · Shortness of breath. · Fever. · Cough. If you need to get care, call ahead to the doctor's office for instructions before you go. Make sure you wear a face mask, if you have one, to prevent exposing other people to the virus. Where can you get the latest information? The following health organizations are tracking and studying this virus. Their websites contain the most up-to-date information. Chayo Chastity also learn what to do if you think you may have been exposed to the virus. · U.S. Centers for Disease Control and Prevention (CDC):  The CDC provides updated news about the disease and travel advice. The website also tells you how to prevent the spread of infection. www.cdc.gov  · World Health Organization [facility]): WHO offers information about the virus outbreaks. WHO also has travel advice. www.who.int  Current as of: April 1, 2020               Content Version: 12.4  © 0324-2398 [employer]. Care instructions adapted under license by your healthcare professional. If you have questions about a medical condition or this instruction, always ask your healthcare professional. [address] any warranty or liability for your use of this information.

## 2020-05-06 NOTE — ANESTHESIA POSTPROCEDURE EVALUATION
Procedure(s):  CYSTOSCOPY, left  URETEROSCOPY, left stent exchange.     general    Anesthesia Post Evaluation      Multimodal analgesia: multimodal analgesia used between 6 hours prior to anesthesia start to PACU discharge  Patient location during evaluation: bedside  Patient participation: complete - patient participated  Level of consciousness: awake  Pain management: adequate  Airway patency: patent  Anesthetic complications: no  Cardiovascular status: stable  Respiratory status: acceptable  Hydration status: acceptable  Post anesthesia nausea and vomiting:  controlled      Vitals Value Taken Time   /58 5/6/2020 10:25 AM   Temp 36.3 °C (97.4 °F) 5/6/2020  9:55 AM   Pulse 75 5/6/2020 10:34 AM   Resp 20 5/6/2020 10:34 AM   SpO2 96 % 5/6/2020 10:34 AM

## 2020-05-06 NOTE — PROGRESS NOTES
conducted a pre-surgery visit with Shon Diana, who is a 70 y.o.,female. The  provided the following Interventions:  Initiated a relationship of care and support. Offered prayer and assurance of continued prayers on patient's behalf. Plan:  Chaplains will continue to follow and will provide pastoral care on an as needed/requested basis.  recommends bedside caregivers page  on duty if patient shows signs of acute spiritual or emotional distress.     [facility]   Spiritual Care   [phone]

## 2020-05-06 NOTE — H&P
Dacia Dose  1940        ASSESSMENT:   No diagnosis found.       1. Multiple left sided nephrolithiasis and ureterolithiasis              -Largest measuring 1.5cm by by CT abd/pelvis wo contrast 8/2015.               -Previously seen on CT abd/pelvis 8/2013.                  -Seen on KUB 3/7/17, renal US 3/13/17 and CT 7/23/18               -KUB 10/18/19 revealed two calcifications in mid left kinky ureter, measuring 5-6mm each. Left upper pole 1 cm calcification and left lower pole 1cm calcification. No right renal calcifications     2. History of urolithiasis              -s/p left complex ureteroscopy with laserlitho 9/2013              -Left ureteroscopy with laserlitho 11/2013              -Left ESWL 5/2015, and left ureteroscopy with laserlitho 5/2015.               Shawna Wills analysis: 25% calcium oxalate, 40% calcium phosphate, and 35% calcium hydrogen phosphate.               -Currently on Urocit-K 30 mEq TID, chlorthalidone 25 mg TID, and spironolactone 50 mg daily     3. Gross hematuria              -Cysto 8/14/18: Infammatory polyps of the urethra, and atrophic urethritis. -Urine cytology 8/14/18: Negative for malignancy. -CTU 7/23/18:  Multiple bilateral nonobstructing renal calculi are evident. No suspicious masses or hydronephrosis     4. Atrophic urethritis/Urge incontinence:              -Seen on cysto 8/14/18.  On Premain cream Mon and Fri.            PLAN:    · Will plan left side ureteroscopy, possible retrograde pyelogram, laser lithotripsy for stone management and left ureteral stent placement at             [facility] - will get repeat culture and keep on antibiotics until procedure in early May  ·               Chief Complaint   Patient presents with    [facility] Kidney Stone       stent still in       HISTORY OF PRESENT Leno Elizondo is a 70 y.o. female who presents today in follow for urolithiasis.      History of malaise in December and was seen in the ED on 12/17/2019 at [place] [address] and placed on antibiotics for a UTI - Omnicef 300 mg BID x 10 days. Had improvement after antibiotics but was still having pain at the lower back.      Presents today for a hospital FU for left side ureteroscopy, possible retrograde pyelogram, laser lithotripsy for stone management and left ureteral stent placement at [facility]. She was able to confirm her appointment and is ready for the procedure. She had a stent placed last month for obstructing stones and alex pus coming out of the left ureteral orifice after decompression.        Prior  History:  Having no discomfort, no CVA tenderness, no fever, No Dysuria, no visible blood in the urine, passed a small stone a week ago and brought today for evaluation.     The CT scan on 1/07/2020 has been reviewed by Dr. Dominique Palmer and will proceed with ureteroscopic surgery to address the ureteral stone visible in the tethered ureter on the left side.     Taking no pain medication at this time, no Flomax but still taking Uricet K TID.      UA today with 2+ blood and 3+ leukocytes     Past History:  She takes Urocit-K 30 mEq TID, chlorthalidone 25 mg TID, and spironolactone 50 mg daily. She is followed by her nephrologist, Dr. Fidel Luna. Patient notes recently Dr. Fidel Luna has decreased her chlorthalidone. She has an upcoming appt with him next month. She denies any stone attacks in the interim and is feeling well today. Denies any flank pain. Denies any f/c/n/v. Notes if she is going to have a procedure she would like it in the winter.            Past Medical History:   Diagnosis Date    Achilles tendinitis      Atrophic urethritis      Back pain      Bone loss      Bursitis      Cigarette smoker      Degenerative arthritis of hip      Diverticular disease      Fibrocystic breast disease      Gallbladder problem       sludge in gall bladder    Gross hematuria      Hemorrhoids      Hernia      History of kidney stones    Hypercholesteremia      Left Achilles tendinitis      Lumbar facet arthropathy      Osteopenia      Urinary frequency              Past Surgical History:   Procedure Laterality Date    ABDOMEN SURGERY PROC UNLISTED   1960s/1980s     many yrs ago for kidney stones    HX CATARACT REMOVAL         LT eye, RT eye    HX COLONOSCOPY        HX HEENT         cataract surgery    HX LITHOTRIPSY   05/06/2015     DP, Dr. Grady Alaniz, Left ESWL    HX LITHOTRIPSY   2000    HX LITHOTRIPSY   2016    HX TONSILLECTOMY   1953    HX UROLOGICAL   09/2013     cysto, retrograde pyelogram and ureteroscopy, laser lithotripsy and double-J stent placement       Social History            Tobacco Use    Smoking status: Current Every Day Smoker       Packs/day: 0.50       Years: 30.00       Pack years: 15.00       Types: Cigarettes    Smokeless tobacco: Never Used   Substance Use Topics    Alcohol use: Yes       Comment: rarely    Drug use: No            Allergies   Allergen Reactions    Sulfur Other (comments)       Wrong allergy    Sulfa (Sulfonamide Antibiotics) Rash            Family History   Problem Relation Age of Onset    Dementia Mother      Osteoporosis Mother      Alzheimer Mother      Cancer Father           Colon Cancer    Cancer Sister           Breast Cancer             Current Outpatient Medications   Medication Sig Dispense Refill    cephALEXin (Keflex) 500 mg capsule Take 1 Cap by mouth three (3) times daily for 10 days. 30 Cap 0    tamsulosin (FLOMAX) 0.4 mg capsule Take 1 Cap by mouth daily.  Indications: stones in the urinary tract 30 Cap 0    folic acid-vit W2-IUZ W61 (FOLBIC) 2.5-25-2 mg tablet TAKE 1 TABLET BY MOUTH EVERY DAY        spironolactone (ALDACTONE) 100 mg tablet Take 100 mg by mouth daily.        chlorthalidone (HYGROTEN) 50 mg tablet Take 100 mg by mouth daily.        allopurinol (ZYLOPRIM) 300 mg tablet          alendronate (FOSAMAX) 35 mg tablet Take 35 mg by mouth every seven (7) days.        DOCOSAHEXANOIC ACID/EPA (FISH OIL PO) Take  by mouth.        potassium citrate (UROCIT-K 10) 10 mEq TbER Take 20 mEq by mouth three (3) times daily (with meals).        fenofibrate nanocrystallized (TRICOR) 145 mg tablet Take  by mouth daily.          buPROPion SR (WELLBUTRIN SR) 150 mg SR tablet Take  by mouth daily. Indications: Smoking Cessation        simvastatin (ZOCOR) 40 mg tablet Take  by mouth nightly.          aspirin 81 mg tablet Take 81 mg by mouth daily. Per patient, stated that she already placed this medicine on hold since August 2013.          Any elements of the PMH, FH, SHx, ROS, or preliminary elements of the HPI that were entered by a medical assistant have been reviewed in full.     Review of Systems  Constitutional: Fever: No  Skin: Rash: No  HEENT: Hearing difficulty: No  Eyes: Blurred vision: No  Cardiovascular: Chest pain: No  Respiratory: Shortness of breath: No  Gastrointestinal: Nausea/vomiting: No  Musculoskeletal: Back pain: No  Neurological: Weakness: No  Psychological: Memory loss: No  Comments/additional findings:      PHYSICAL EXAM: (last office Visit)  Visit Vitals  Ht 5' 10\" (1.778 m)   Wt 210 lb (95.3 kg)   BMI 30.13 kg/m²      Constitutional: Well developed, well-nourished female in no acute distress. Respiratory: No respiratory distress or difficulties, CTAB, Heart:  RRR  Skin: No bruising or rashes. No petechia. Neuro/Psych:  Patient with appropriate affect. Alert and oriented.       REVIEW OF LABS AND IMAGING:    No results found for any visits on 04/23/20.     CT Scan abdomen and pelvis 1/07/2020:  IMPRESSION  1. Left mid ureter is similarly tethered at the caudal left pararenal fascia but there are new calcifications at this location. A few of the stones may be within the ureter but there is no upstream hydroureter. At least the largest calcification is suspected to be outside of the ureter and dystrophic.  Bilateral nonobstructing nephrolithiasis otherwise. 2. The left kidney is dystrophic with lobulations and regions of cortical scarring and thickening, not well evaluated without IV contrast. The prior subcapsular hematoma has resolved. 3. Bilateral small hemorrhagic/proteinaceous cyst.  4. Mild colonic diverticulosis. 5. Hepatomegaly and mild steatosis. 6. Fibroid uterus with calcifications. Coarse cervical calcification. 7. Moderate atherosclerosis with likely multifocal regions of stenosis.       KUB 10/18/19  Impression:   Two calcifications in mid left kinky ureter, measuring 5-6mm each. Left upper pole 1 cm calcification and left lower pole 1cm calcification   Right hypertrophy but no obvious right renal calcifications  Fibroid uterus      CTU 7/23/18  IMPRESSION:     Multiple bilateral nonobstructing renal calculi are evident. No suspicious  masses or hydronephrosis     Calcified uterine leiomyoma     Chronic degenerative changes involving the hips, spine and SI joints     Multiple layering gallstones     Hepatic steatosis     FAHAD 7/5/2018: Impression:    Bilateral renal cysts. Bilateral non-obstructing stones. Left lobulated kidney. Non-fully distended bladder with bilateral urine jets. Volume = 46 cc's.     Renal US 3/13/2017   IMPRESSION:  1. Left renal calcification identified. No hydronephrosis is seen bilaterally. 2. Apparent asymmetric renal sizes which may be artifactual related to scanning  technique.     KUB 3/7/2017:  Impression:  Normal bowel gas pattern  16 x 4.6 mm calyceal stones in left upper pole  Left lower pole stones also seen  No ureteral stones  Calcified uterine fibroid     CT Abdomen and Pelvis 2/3/2015:   Right Kidney, Ureter: The right kidney demonstrates prominent  hydronephrosis. The right renal pelvis width is estimated at  about 6.0 cm. Additional tiny punctate intrarenal stones  identified in the right kidney.  The right ureter is  asymmetrically dilated on the right down to the right distal  ureterovesical junction. The right distal ureteral vesicle  junction stone measures about 0.7 x 0.4 cm (image #79). More  proximal to the right ureterovesical junction stone is a less  distinct subtle calcific density (image #78), measuring 0.5 x 0.5  cm. This is still probably related to a distal ureteral stone  despite relatively decreased overall density compared to the more  well formed stone more inferiorly. Alternatively, precipitation  of calcium granules due to stasis of the urine may also be  considered. Left Kidney, Ureter: No evidence for left ureteral stone is  identified. The left ureter is of normal caliber. The left  renal pelvis is mildly prominent. The etiology for the renal  pelviectasis is unclear. There are multiple fairly prominent  internal stones in the left kidney the largest of the left  internal stones measures about 1.5 cm.      [facility] 8/10/2016  Urine Volume:                         4.44  SS CaOx                     6.58  Urine Calcium             424  Urine Oxalate              69  Urine Citrate                617  SS CaP                       2.17  24 Hour Urine pH        7.348  SS Uric Acid                0.02  Urine Uric Acid            0.943           Patient's BMI is out of the normal parameters. Information about BMI was given and patient was advised to follow-up with their PCP for further management.        Cc: MD Tom Duggan MD  Urology of [address]. [facility]  [phone] (office)      Patient seen and examined in pre-op  No changes to H&P  Urine culture: NG  Images reviewed: yes  Consent signed  Patient marked on LEFT  All questions answered  Ok to proceed    Cordell Turner MD     Staff:  I have interviewed and examined this patient. I agree with the assessment and plans as outlined above.     Tom Ferguson MD

## 2020-05-06 NOTE — PERIOP NOTES
Bladder scan completed x 2. !st result: 494 mls. 2nd result with a different bladder scan borrowed from the floor: 664 mls. Patient stated that she does not want a catheter. Patient up to the bathroom with assist to attempt to void. Will continue to follow up.

## 2020-05-06 NOTE — PROCEDURES
5/6/2020  Eleonora Pearce  1940  [id number]     PREOPERATIVE DIAGNOSIS:  Left mid ureteral stone, left renal stones     POSTOPERATIVE DIAGNOSIS:  Same as above     SURGEON:  Celine Camp MD     ASSISTANTS:   Tara Link, PGY-4     OPERATION PERFORMED:  Cystoscopy, left retrograde pyelogram, left ureteroscopy, and left stent exchange with intra-op interpretation of fluoroscopy < 1h.      ANESTHESIA:  General.     ESTIMATED BLOOD LOSS:  Minimal.     COMPLICATIONS:  None.     INDICATIONS FOR PROCEDURE:  Eleonora Pearce is a 70 y.o.  female, who had a  left mid ureteral stone and multiple left renal stones found on CT scan, along with significant J hooking of mid ureter, deviating the ureter laterally. She had an attempt of definitive stone management on 3/5/2020 with no success due to deviation of the ureter and inability to target the stone. The patient is now brought back today for definitive treatment of the stones with cystoscopy,  ureteroscopy, laser lithotripsy. The patient understands risks, benefits, detailed procedure, and possible injury to the urethra, bladder, ureter, and kidney. Also possible need for additional surgery.     FINDINGS:  1. Cystocele, unremarkable urethra, bladder without lesions concerning for malignancy  2.  fluoroscopy with radio-opaque 8mm mid ureteral stone, additional renal stones approx 1cm  3. Left retrograde pyelogram revealing J-hook at mid/proximal ureter with lateral course and acute angle at level of the stone  4. Ureteroscopy with stone noted at level slightly proximal to region of angulation in ureter. Edema noted surrounding stone. Inability to obtain window to laser stone with laser fiber in flexible ureteroscope given significant angulation of ureter. 5. Final retrograde without extrav.  Final stent placement with proximal coil in renal pelvis and distal coil in bladder.      SPECIMENS:  None     DRAINS:  A 6-Persian, 28-cm double J stent        DESCRIPTION OF OPERATION:  After informed consent was obtained from the patient, the patient was identified and the patient was taken to the operating room on 5/6/2020 and placed in the supine position. General anesthesia was administered as well as perioperative IV antibiotics. Sequential compression devices were applied to the lower extremities at the beginning of the case for DVT prophylaxis. The patient was then placed in the dorsal lithotomy position, prepped and draped in the usual sterile fashion. At the beginning of the case, a time-out was performed to properly identify the patient, surgery to be performed, and surgical site/side.      We passed the 21-Guamanian rigid cystoscope through the urethra and into the bladder under vision without any difficulty, noting a normal urethra. A systematic evaluation of the bladder revealed no evidence of any suspicious bladder lesions. Ureteral orifices were in normal position with stent seen protruding from left ureter. We grasped the distal end of the stent and removed to the level of the urethral meatus. We were unable to pass a sensor wire through the stent due to some encrustation. Therefore, we passed a sensor wire alongside the stent after re-introducing the cystoscope into the bladder. We then passed a semi-rigid ureteroscope alongside the wire and passed to the level of the mid-ureter where we encountered significant angulation. With this scope, we were unable to visualize the stone despite some torquing on the ureter and inability to pass beyond the acute angulation. Therefore, we passed a glide wire to the renal pelvis and then cannulated this with a 5-Guamanian open-ended ureteral catheter and gentle retrograde pyelogram was performed, noting a J-hook at mid/proximal ureter. The ureter had a lateral course twisting medially at an acute angle at level of the stone.  There was noted mild hydronephrosis, and there was a 8 mm filling defect in the mid/proximal ureter, corresponding to the stone. We then passed a 0.038 Glidewire up to the level of the renal pelvis. The ureteral catheter and cystoscope were then removed, leaving the Glidewire up the ureter. At this point, we tried to use a digital flexible ureteroscope; however, after a prolonged attempt and troubleshooting the software, we were unable to obtain an image with this scope. Therefore, we used an analog flex-X and passed the flexible ureteroscope up the ureter, to the level just proximal to the stone. A 272 micron laser fiber was passed up the scope. With the acute angle of the ureter it was not possible to find a safe angle to laser the stone. Despite many attempts to change the angle of the scope, the deflection of the scope and the edema surrounding the stone would not permit a safe window to laser without endangering the mucosa. We were only able to see a small area of the stone with the laser fiber in place given the acute angulation of the ureter. We tried several times with re-passing a glidewire through the scope to attempt to better identify the stone; however, this was unable to be achieved. Contrast injected through the scope revealed the persisting J-hook angle and no extravasation.      We withdrew the ureteroscope back down the ureter, noting no evidence of any stones along the course of the ureter and no ureteral damage. The safety wire was backloaded through the scope. A 6Fr x28cm JJ stent was placed under fluoroscopic and visual guidance, making sure that the proximal and distal ends coil within the kidney and bladder respectively.     The cystoscope was used to drain the bladder and then removed. The patient tolerated the procedure well and there was no immediate complication. She was awoken from anesthesia and taken to the recovery room in stable condition. Dr. Kat Miner was present and scrubbed throughout entire procedure.      DISPOSITION: To the PACU in good condition. Discharge home.  Follow up to discuss ESWL vs PCNL and antegrade ureteroscopy in 2 weeks. Cherrie Oconnell, PGY-4     staff:  I performed this procedure with the assistance of Dr. Katrin Carter. I agree with the above documentation of the procedure as presented.     Erendira Webster MD

## 2020-05-06 NOTE — PERIOP NOTES
Pre-Op Summary    Pt arrived via car with family/friend and is oriented to time, place, person and situation. Patient with steady gait with none assistive devices. Visit Vitals  Ht 5' 10\" (1.778 m)   Wt 95.7 kg (211 lb)   BMI 30.28 kg/m²           Patients belongings are located [facility]. Patient's point of contact is  Kisha Galan and their contact number is: [phone]. They will be leaving and coming back. They are able to receive medication information. They will be their ride home.

## 2020-05-06 NOTE — ANESTHESIA PREPROCEDURE EVALUATION
Relevant Problems   No relevant active problems       Anesthetic History   No history of anesthetic complications            Review of Systems / Medical History  Patient summary reviewed and pertinent labs reviewed    Pulmonary          Smoker         Neuro/Psych   Within defined limits           Cardiovascular  Within defined limits                     GI/Hepatic/Renal         Renal disease: stones       Endo/Other        Arthritis     Other Findings              Physical Exam    Airway  Mallampati: III  TM Distance: 4 - 6 cm  Neck ROM: decreased range of motion   Mouth opening: Normal     Cardiovascular    Rhythm: regular  Rate: normal         Dental    Dentition: Poor dentition     Pulmonary  Breath sounds clear to auscultation               Abdominal  GI exam deferred       Other Findings            Anesthetic Plan    ASA: 2  Anesthesia type: general          Induction: Intravenous  Anesthetic plan and risks discussed with: Patient

## 2020-07-17 ENCOUNTER — HOSPITAL ENCOUNTER (OUTPATIENT)
Dept: PREADMISSION TESTING | Age: 80
Discharge: HOME | End: 2020-07-17
Payer: MEDICARE

## 2020-07-17 ENCOUNTER — HOSPITAL ENCOUNTER (OUTPATIENT)
Dept: LAB | Age: 80
Discharge: HOME | End: 2020-07-17
Payer: MEDICARE

## 2020-07-17 DIAGNOSIS — Z01.818 PRE-OP TESTING: ICD-10-CM

## 2020-07-17 DIAGNOSIS — N20.0 KIDNEY STONE: ICD-10-CM

## 2020-07-17 LAB
ANION GAP SERPL CALC-SCNC: 7 MMOL/L (ref 3–18)
APTT PPP: 33.1 SEC (ref 23–36.4)
ATRIAL RATE: 80 BPM
BUN SERPL-MCNC: 22 MG/DL (ref 7–18)
BUN/CREAT SERPL: 16 (ref 12–20)
CALCIUM SERPL-MCNC: 9.1 MG/DL (ref 8.5–10.1)
CALCULATED P AXIS, ECG09: 92 DEGREES
CALCULATED R AXIS, ECG10: -29 DEGREES
CALCULATED T AXIS, ECG11: 76 DEGREES
CHLORIDE SERPL-SCNC: 98 MMOL/L (ref 100–111)
CO2 SERPL-SCNC: 29 MMOL/L (ref 21–32)
CREAT SERPL-MCNC: 1.34 MG/DL (ref 0.6–1.3)
DIAGNOSIS, 93000: NORMAL
ERYTHROCYTE [DISTWIDTH] IN BLOOD BY AUTOMATED COUNT: 14.2 % (ref 11.6–14.5)
GLUCOSE SERPL-MCNC: 118 MG/DL (ref 74–99)
HCT VFR BLD AUTO: 41.5 % (ref 35–45)
HGB BLD-MCNC: 14.2 G/DL (ref 12–16)
INR PPP: 1.1 (ref 0.8–1.2)
MCH RBC QN AUTO: 34.4 PG (ref 24–34)
MCHC RBC AUTO-ENTMCNC: 34.2 G/DL (ref 31–37)
MCV RBC AUTO: 100.5 FL (ref 74–97)
P-R INTERVAL, ECG05: 178 MS
PLATELET # BLD AUTO: 254 K/UL (ref 135–420)
PMV BLD AUTO: 9.4 FL (ref 9.2–11.8)
POTASSIUM SERPL-SCNC: 3.7 MMOL/L (ref 3.5–5.5)
PROTHROMBIN TIME: 14 SEC (ref 11.5–15.2)
Q-T INTERVAL, ECG07: 374 MS
QRS DURATION, ECG06: 92 MS
QTC CALCULATION (BEZET), ECG08: 431 MS
RBC # BLD AUTO: 4.13 M/UL (ref 4.2–5.3)
SODIUM SERPL-SCNC: 134 MMOL/L (ref 136–145)
VENTRICULAR RATE, ECG03: 80 BPM
WBC # BLD AUTO: 7.9 K/UL (ref 4.6–13.2)

## 2020-07-17 PROCEDURE — 93005 ELECTROCARDIOGRAM TRACING: CPT

## 2020-07-17 PROCEDURE — 36415 COLL VENOUS BLD VENIPUNCTURE: CPT

## 2020-07-17 PROCEDURE — 85610 PROTHROMBIN TIME: CPT

## 2020-07-17 PROCEDURE — 80048 BASIC METABOLIC PNL TOTAL CA: CPT

## 2020-07-17 PROCEDURE — 87635 SARS-COV-2 COVID-19 AMP PRB: CPT

## 2020-07-17 PROCEDURE — 85027 COMPLETE CBC AUTOMATED: CPT

## 2020-07-17 PROCEDURE — 85730 THROMBOPLASTIN TIME PARTIAL: CPT

## 2020-07-17 PROCEDURE — 87086 URINE CULTURE/COLONY COUNT: CPT

## 2020-07-18 LAB
BACTERIA SPEC CULT: NORMAL
SERVICE CMNT-IMP: NORMAL

## 2020-07-20 LAB — SARS-COV-2, COV2NT: NOT DETECTED

## 2020-07-21 NOTE — PERIOP NOTES
PAT - SURGICAL PRE-ADMISSION INSTRUCTIONS    NAME:  Michael Serrano                                                          TODAY'S DATE:  7/21/2020    SURGERY DATE:  7/23/2020                                  SURGERY ARRIVAL TIME:   1030 TBV    1. Do NOT eat or drink anything, including candy or gum, after MIDNIGHT on 7/22/20 , unless you have specific instructions from your Surgeon or Anesthesia Provider to do so. 2. No smoking on the day of surgery. 3. No alcohol 24 hours prior to the day of surgery. 4. No recreational drugs for one week prior to the day of surgery. 5. Leave all valuables, including money/purse, at home. 6. Remove all jewelry, nail polish, makeup (including mascara); no lotions, powders, deodorant, or perfume/cologne/after shave. 7. Glasses/Contact lenses and Dentures may be worn to the hospital.  They will be removed prior to surgery. 8. Call your doctor if symptoms of a cold or illness develop within 24 ours prior to surgery. 9. AN ADULT MUST DRIVE YOU HOME AFTER OUTPATIENT SURGERY. 10. If you are having an OUTPATIENT procedure, please make arrangements for a responsible adult to be with you for 24 hours after your surgery. 11. If you are admitted to the hospital, you will be assigned to a bed after surgery is complete. Normally a family member will not be able to see you until you are in your assigned bed. 15. Family is encouraged to accompany you to the hospital.  We ask visitors in the treatment area to be limited to ONE person at a time to ensure patient privacy. EXCEPTIONS WILL BE MADE AS NEEDED. 15. Children under 12 are discouraged from entering the treatment area and need to be supervised by an adult when in the waiting room. Special Instructions:     Take these medications the morning of surgery with a sip of water:  Smita Chester, STOP anticoagulants AT LEAST 1 WEEK PRIOR to your surgery or, follow other MD instructions:  ASPIRIN    Patient Prep:    shower with anti-bacterial soap    These surgical instructions were reviewed with PATIENT during the PAT PHONE CALL. Directions: On the morning of surgery, please go to the MAIN ENTRANCE. Sign in at the Registration Desk.     If you have any questions and/or concerns, please do not hesitate to call:  (Prior to the day of surgery)  [facility] unit:  [phone]  (Day of surgery)  [facility] unit:  [phone]

## 2020-07-23 ENCOUNTER — ANESTHESIA (OUTPATIENT)
Dept: SURGERY | Age: 80
End: 2020-07-23
Payer: MEDICARE

## 2020-07-23 ENCOUNTER — HOSPITAL ENCOUNTER (OUTPATIENT)
Age: 80
Setting detail: OUTPATIENT SURGERY
Discharge: HOME | End: 2020-07-23
Attending: UROLOGY | Admitting: UROLOGY
Payer: MEDICARE

## 2020-07-23 ENCOUNTER — ANESTHESIA EVENT (OUTPATIENT)
Dept: SURGERY | Age: 80
End: 2020-07-23
Payer: MEDICARE

## 2020-07-23 ENCOUNTER — APPOINTMENT (OUTPATIENT)
Dept: GENERAL RADIOLOGY | Age: 80
End: 2020-07-23
Attending: UROLOGY
Payer: MEDICARE

## 2020-07-23 VITALS
DIASTOLIC BLOOD PRESSURE: 51 MMHG | RESPIRATION RATE: 24 BRPM | HEIGHT: 70 IN | WEIGHT: 216 LBS | BODY MASS INDEX: 30.92 KG/M2 | TEMPERATURE: 97.5 F | SYSTOLIC BLOOD PRESSURE: 95 MMHG | OXYGEN SATURATION: 94 % | HEART RATE: 75 BPM

## 2020-07-23 DIAGNOSIS — N20.0 KIDNEY STONES: Primary | ICD-10-CM

## 2020-07-23 LAB — GLUCOSE BLD STRIP.AUTO-MCNC: 123 MG/DL (ref 70–110)

## 2020-07-23 PROCEDURE — 77030032490 HC SLV COMPR SCD KNE COVD -B: Performed by: UROLOGY

## 2020-07-23 PROCEDURE — C1758 CATHETER, URETERAL: HCPCS | Performed by: UROLOGY

## 2020-07-23 PROCEDURE — 74011636320 HC RX REV CODE- 636/320: Performed by: UROLOGY

## 2020-07-23 PROCEDURE — 82962 GLUCOSE BLOOD TEST: CPT

## 2020-07-23 PROCEDURE — 74011250636 HC RX REV CODE- 250/636: Performed by: ANESTHESIOLOGY

## 2020-07-23 PROCEDURE — C1769 GUIDE WIRE: HCPCS | Performed by: UROLOGY

## 2020-07-23 PROCEDURE — 74011000250 HC RX REV CODE- 250: Performed by: NURSE ANESTHETIST, CERTIFIED REGISTERED

## 2020-07-23 PROCEDURE — 77030007851 HC IRR CYSTO/TUR BSC -B: Performed by: UROLOGY

## 2020-07-23 PROCEDURE — 76060000035 HC ANESTHESIA 2 TO 2.5 HR: Performed by: UROLOGY

## 2020-07-23 PROCEDURE — 76210000006 HC OR PH I REC 0.5 TO 1 HR: Performed by: UROLOGY

## 2020-07-23 PROCEDURE — 74011250636 HC RX REV CODE- 250/636: Performed by: NURSE ANESTHETIST, CERTIFIED REGISTERED

## 2020-07-23 PROCEDURE — 77030013079 HC BLNKT BAIR HGGR 3M -A: Performed by: ANESTHESIOLOGY

## 2020-07-23 PROCEDURE — 74420 UROGRAPHY RTRGR +-KUB: CPT

## 2020-07-23 PROCEDURE — C2617 STENT, NON-COR, TEM W/O DEL: HCPCS | Performed by: UROLOGY

## 2020-07-23 PROCEDURE — C1894 INTRO/SHEATH, NON-LASER: HCPCS | Performed by: UROLOGY

## 2020-07-23 PROCEDURE — 74011250636 HC RX REV CODE- 250/636: Performed by: UROLOGY

## 2020-07-23 PROCEDURE — 76010000131 HC OR TIME 2 TO 2.5 HR: Performed by: UROLOGY

## 2020-07-23 PROCEDURE — 74011000258 HC RX REV CODE- 258: Performed by: UROLOGY

## 2020-07-23 PROCEDURE — 82360 CALCULUS ASSAY QUANT: CPT

## 2020-07-23 PROCEDURE — 77030008683 HC TU ET CUF COVD -A: Performed by: ANESTHESIOLOGY

## 2020-07-23 PROCEDURE — 77030018836 HC SOL IRR NACL ICUM -A: Performed by: UROLOGY

## 2020-07-23 PROCEDURE — 77030030430 HC EXTRCT STN COOK -C: Performed by: UROLOGY

## 2020-07-23 PROCEDURE — 76210000026 HC REC RM PH II 1 TO 1.5 HR: Performed by: UROLOGY

## 2020-07-23 RX ORDER — SODIUM CHLORIDE, SODIUM LACTATE, POTASSIUM CHLORIDE, CALCIUM CHLORIDE 600; 310; 30; 20 MG/100ML; MG/100ML; MG/100ML; MG/100ML
75 INJECTION, SOLUTION INTRAVENOUS CONTINUOUS
Status: DISCONTINUED | OUTPATIENT
Start: 2020-07-23 | End: 2020-07-23 | Stop reason: HOSPADM

## 2020-07-23 RX ORDER — SODIUM CHLORIDE 0.9 % (FLUSH) 0.9 %
5-40 SYRINGE (ML) INJECTION AS NEEDED
Status: DISCONTINUED | OUTPATIENT
Start: 2020-07-23 | End: 2020-07-23 | Stop reason: HOSPADM

## 2020-07-23 RX ORDER — FENTANYL CITRATE 50 UG/ML
50 INJECTION, SOLUTION INTRAMUSCULAR; INTRAVENOUS
Status: DISCONTINUED | OUTPATIENT
Start: 2020-07-23 | End: 2020-07-23 | Stop reason: SDUPTHER

## 2020-07-23 RX ORDER — ONDANSETRON 2 MG/ML
4 INJECTION INTRAMUSCULAR; INTRAVENOUS ONCE
Status: DISCONTINUED | OUTPATIENT
Start: 2020-07-23 | End: 2020-07-23 | Stop reason: SDUPTHER

## 2020-07-23 RX ORDER — FENTANYL CITRATE 50 UG/ML
50 INJECTION, SOLUTION INTRAMUSCULAR; INTRAVENOUS AS NEEDED
Status: DISCONTINUED | OUTPATIENT
Start: 2020-07-23 | End: 2020-07-23 | Stop reason: HOSPADM

## 2020-07-23 RX ORDER — SODIUM CHLORIDE 0.9 % (FLUSH) 0.9 %
5-40 SYRINGE (ML) INJECTION EVERY 8 HOURS
Status: DISCONTINUED | OUTPATIENT
Start: 2020-07-23 | End: 2020-07-23 | Stop reason: HOSPADM

## 2020-07-23 RX ORDER — NALOXONE HYDROCHLORIDE 0.4 MG/ML
0.4 INJECTION, SOLUTION INTRAMUSCULAR; INTRAVENOUS; SUBCUTANEOUS AS NEEDED
Status: DISCONTINUED | OUTPATIENT
Start: 2020-07-23 | End: 2020-07-23 | Stop reason: HOSPADM

## 2020-07-23 RX ORDER — MAGNESIUM SULFATE 100 %
4 CRYSTALS MISCELLANEOUS AS NEEDED
Status: DISCONTINUED | OUTPATIENT
Start: 2020-07-23 | End: 2020-07-23 | Stop reason: HOSPADM

## 2020-07-23 RX ORDER — ROCURONIUM BROMIDE 10 MG/ML
INJECTION, SOLUTION INTRAVENOUS AS NEEDED
Status: DISCONTINUED | OUTPATIENT
Start: 2020-07-23 | End: 2020-07-23 | Stop reason: HOSPADM

## 2020-07-23 RX ORDER — LIDOCAINE HYDROCHLORIDE 20 MG/ML
INJECTION, SOLUTION EPIDURAL; INFILTRATION; INTRACAUDAL; PERINEURAL AS NEEDED
Status: DISCONTINUED | OUTPATIENT
Start: 2020-07-23 | End: 2020-07-23 | Stop reason: HOSPADM

## 2020-07-23 RX ORDER — OXYBUTYNIN CHLORIDE 5 MG/1
5 TABLET ORAL
Qty: 30 TAB | Refills: 0 | Status: SHIPPED | OUTPATIENT
Start: 2020-07-23 | End: 2021-06-17

## 2020-07-23 RX ORDER — DEXAMETHASONE SODIUM PHOSPHATE 4 MG/ML
INJECTION, SOLUTION INTRA-ARTICULAR; INTRALESIONAL; INTRAMUSCULAR; INTRAVENOUS; SOFT TISSUE AS NEEDED
Status: DISCONTINUED | OUTPATIENT
Start: 2020-07-23 | End: 2020-07-23 | Stop reason: HOSPADM

## 2020-07-23 RX ORDER — ONDANSETRON 2 MG/ML
INJECTION INTRAMUSCULAR; INTRAVENOUS AS NEEDED
Status: DISCONTINUED | OUTPATIENT
Start: 2020-07-23 | End: 2020-07-23 | Stop reason: HOSPADM

## 2020-07-23 RX ORDER — SUCCINYLCHOLINE CHLORIDE 100 MG/5ML
SYRINGE (ML) INTRAVENOUS AS NEEDED
Status: DISCONTINUED | OUTPATIENT
Start: 2020-07-23 | End: 2020-07-23 | Stop reason: HOSPADM

## 2020-07-23 RX ORDER — PROPOFOL 10 MG/ML
INJECTION, EMULSION INTRAVENOUS AS NEEDED
Status: DISCONTINUED | OUTPATIENT
Start: 2020-07-23 | End: 2020-07-23 | Stop reason: HOSPADM

## 2020-07-23 RX ORDER — FENTANYL CITRATE 50 UG/ML
INJECTION, SOLUTION INTRAMUSCULAR; INTRAVENOUS AS NEEDED
Status: DISCONTINUED | OUTPATIENT
Start: 2020-07-23 | End: 2020-07-23 | Stop reason: HOSPADM

## 2020-07-23 RX ORDER — OXYCODONE AND ACETAMINOPHEN 5; 325 MG/1; MG/1
1 TABLET ORAL
Qty: 10 TAB | Refills: 0 | Status: SHIPPED | OUTPATIENT
Start: 2020-07-23 | End: 2020-07-26

## 2020-07-23 RX ORDER — ONDANSETRON 2 MG/ML
4 INJECTION INTRAMUSCULAR; INTRAVENOUS
Status: DISCONTINUED | OUTPATIENT
Start: 2020-07-23 | End: 2020-07-23 | Stop reason: HOSPADM

## 2020-07-23 RX ORDER — TAMSULOSIN HYDROCHLORIDE 0.4 MG/1
0.4 CAPSULE ORAL
Qty: 30 CAP | Refills: 0 | Status: SHIPPED | OUTPATIENT
Start: 2020-07-23 | End: 2021-06-17

## 2020-07-23 RX ORDER — SODIUM CHLORIDE, SODIUM LACTATE, POTASSIUM CHLORIDE, CALCIUM CHLORIDE 600; 310; 30; 20 MG/100ML; MG/100ML; MG/100ML; MG/100ML
25 INJECTION, SOLUTION INTRAVENOUS CONTINUOUS
Status: DISCONTINUED | OUTPATIENT
Start: 2020-07-23 | End: 2020-07-23 | Stop reason: HOSPADM

## 2020-07-23 RX ORDER — HYDROMORPHONE HYDROCHLORIDE 1 MG/ML
0.5 INJECTION, SOLUTION INTRAMUSCULAR; INTRAVENOUS; SUBCUTANEOUS
Status: DISCONTINUED | OUTPATIENT
Start: 2020-07-23 | End: 2020-07-23 | Stop reason: HOSPADM

## 2020-07-23 RX ADMIN — FENTANYL CITRATE 50 MCG: 50 INJECTION, SOLUTION INTRAMUSCULAR; INTRAVENOUS at 16:04

## 2020-07-23 RX ADMIN — GENTAMICIN SULFATE 396 MG: 40 INJECTION, SOLUTION INTRAMUSCULAR; INTRAVENOUS at 13:36

## 2020-07-23 RX ADMIN — LIDOCAINE HYDROCHLORIDE 80 MG: 20 INJECTION, SOLUTION INTRAVENOUS at 13:32

## 2020-07-23 RX ADMIN — ROCURONIUM BROMIDE 10 MG: 10 SOLUTION INTRAVENOUS at 15:20

## 2020-07-23 RX ADMIN — FENTANYL CITRATE 50 MCG: 50 INJECTION, SOLUTION INTRAMUSCULAR; INTRAVENOUS at 13:25

## 2020-07-23 RX ADMIN — ROCURONIUM BROMIDE 10 MG: 10 SOLUTION INTRAVENOUS at 14:32

## 2020-07-23 RX ADMIN — FENTANYL CITRATE 50 MCG: 50 INJECTION, SOLUTION INTRAMUSCULAR; INTRAVENOUS at 13:32

## 2020-07-23 RX ADMIN — SUGAMMADEX 196 MG: 100 INJECTION, SOLUTION INTRAVENOUS at 15:31

## 2020-07-23 RX ADMIN — ROCURONIUM BROMIDE 30 MG: 10 SOLUTION INTRAVENOUS at 13:45

## 2020-07-23 RX ADMIN — DEXAMETHASONE SODIUM PHOSPHATE 8 MG: 4 INJECTION, SOLUTION INTRA-ARTICULAR; INTRALESIONAL; INTRAMUSCULAR; INTRAVENOUS; SOFT TISSUE at 13:39

## 2020-07-23 RX ADMIN — SODIUM CHLORIDE, SODIUM LACTATE, POTASSIUM CHLORIDE, AND CALCIUM CHLORIDE 25 ML/HR: 600; 310; 30; 20 INJECTION, SOLUTION INTRAVENOUS at 15:55

## 2020-07-23 RX ADMIN — SODIUM CHLORIDE, SODIUM LACTATE, POTASSIUM CHLORIDE, AND CALCIUM CHLORIDE 25 ML/HR: 600; 310; 30; 20 INJECTION, SOLUTION INTRAVENOUS at 11:42

## 2020-07-23 RX ADMIN — ONDANSETRON 4 MG: 2 SOLUTION INTRAMUSCULAR; INTRAVENOUS at 15:26

## 2020-07-23 RX ADMIN — ROCURONIUM BROMIDE 10 MG: 10 SOLUTION INTRAVENOUS at 15:06

## 2020-07-23 RX ADMIN — Medication 100 MG: at 13:32

## 2020-07-23 RX ADMIN — PROPOFOL 120 MG: 10 INJECTION, EMULSION INTRAVENOUS at 13:32

## 2020-07-23 NOTE — H&P
ASSESSMENT:   1. Kidney stone    2. Retained ureteral stent                1. Multiple left sided ureteral and renal stones              Largest measuring 1.5cm by by CT abd/pelvis wo contrast 8/2015.               Previously seen on CT abd/pelvis 8/2013.                   Seen on KUB 3/7/17, renal US 3/13/17 and CT 7/23/18               KUB 10/18/19 revealed two calcifications in mid left kinky ureter, measuring 5-6mm each. Left upper pole 1 cm calcification and left lower pole 1cm calcification. No right renal calcifications              CT A/P 1/7/20: Mulitple calcifications in left kidney inlcuding 1.3cm stone in upper pole and 0.8cm in interpolar kidney. Few left ureteral calcifications largest measuring 8mm.             S/p attempted left ureteroscopies 3/4/20 and 5/6/20 with no success due to deviation of ureter and inability to target stone.                S/p left ESWL by Dr. Katya Allen 5/20/20 of left ureteral stone and upper manjinder left renal stone              KUB 6/16/20: left renal and ureteral stones still remain, possible single fragment dislodged further along distal ureter.               To OR for Cysto, left ureteroscopy, laser lithotripsy, stone extraction, left ureteral stent exchange     2. Right nephrolithiasis              CT A/P 1/7/20: Largest calcificatios in right kidney 0.5cm in upperpole and 0.4cm interpolar kidney.      3. History of urolithiasis              s/p left complex ureteroscopy with laserlitho 9/2013              Left ureteroscopy with laserlitho 11/2013              Left ESWL 5/2015, and left ureteroscopy with laserlitho 5/2015.               Stone analysis: 25% calcium oxalate, 40% calcium phosphate, and 35% calcium hydrogen phosphate.               Currently on Urocit-K 30 mEq TID, chlorthalidone 25 mg TID, and spironolactone 50 mg daily     4. Gross hematuria               Cysto 8/14/18: Infammatory polyps of the urethra, and atrophic urethritis.             Urine cytology 8/14/18: Negative for malignancy.             WHG 7/23/18:  Multiple bilateral nonobstructing renal calculi are evident. No suspicious masses or hydronephrosis     5. Atrophic urethritis/Urge incontinence:              Seen on cysto 8/14/18. On Premain cream Mon and Fri.         PLAN:    · Reviewed recent KUB images and report  · Reviewed CT Abd Pelv images- discussed that stone appears to be outside the ureter, possible stricture   · Recommend repeat CT Abd Pelv without contrast place  · Will schedule pt for Cysto, left ureteroscopy, possible laser lithotripsy, stone extraction, left ureteral stent exchange on 7/23/20. Reviewed risks and benefits. Will review CT scan at time of ureteroscopy  · Discussed the potential need for PCN tube  · Urine culture today     Risks and benefits of ureteroscopy were reviewed including but not limited to infection, bleeding, pain, ureteral injury, persistent stone disease, requirement for staged procedure, possible stent, and global anesthesia risks. Patient expressed understanding and desires to proceed with ureteroscopy.                  Chief Complaint   Patient presents with    Kidney Stone        HISTORY OF PRESENT ILLNESS:  Rika Kathleen is a 70 y.o. female who present for pre-op evaluation. She will be scheduled for Cysto, left ureteroscopy, laser lithotripsy, stone extraction, left ureteral stent exchange on 7/23/20.      Pt with left mid ureteral stone and multiple left renal stones found on CT scan, along with significant J hooking of mid ureter, deviating the ureter laterally. She had an attempt of definitive stone management on 3/5/2020 and 5/6/20 with no success due to deviation of the ureter and inability to target the stone. S/p left ESWL by Dr. Mohamud Leo 5/20/20 of left ureteral stone and upper manjinder left renal stone.     Pt is doing well today with ureteral stent in place.   Patient is asymptomatic pain 0 out of 10     She denies abdominal surgery but did have 2 open kidney stone surgeries in the 62s and 80s.    She takes Urocit-K 30 mEq TID, chlorthalidone 25 mg TID, and spironolactone 50 mg daily. She is followed by her nephrologist, Dr. Axel Pike.        No flowsheet data found.             Past Medical History:   Diagnosis Date    Achilles tendinitis      Atrophic urethritis      Back pain      Bone loss      Bursitis      Cigarette smoker      Degenerative arthritis of hip      Diverticular disease      Fibrocystic breast disease      Gallbladder problem       sludge in gall bladder    Gross hematuria      Hemorrhoids      Hernia      History of kidney stones      Hypercholesteremia      Left Achilles tendinitis      Lumbar facet arthropathy      Osteopenia      Urinary frequency                Past Surgical History:   Procedure Laterality Date    ABDOMEN SURGERY PROC UNLISTED   1960s/1980s     many yrs ago for kidney stones    CYSTOSCOPY,INSERT URETERAL STENT   05/2020    CYSTOSCOPY,INSERT URETERAL STENT   03/2020    HX CATARACT REMOVAL         LT eye, RT eye    HX COLONOSCOPY        HX HEENT         cataract surgery    HX LITHOTRIPSY   05/06/2015     DP, Dr. Morris Elizabeth, Left ESWL    HX LITHOTRIPSY   2000    HX LITHOTRIPSY   2016    HX TONSILLECTOMY   1953    HX UROLOGICAL   09/2013     cysto, retrograde pyelogram and ureteroscopy, laser lithotripsy and double-J stent placement         Social History            Tobacco Use    Smoking status: Current Every Day Smoker       Packs/day: 0.50       Years: 30.00       Pack years: 15.00       Types: Cigarettes    Smokeless tobacco: Never Used   Substance Use Topics    Alcohol use: Yes       Comment: rarely    Drug use:  No              Allergies   Allergen Reactions    Sulfur Other (comments)       Wrong allergy    Sulfa (Sulfonamide Antibiotics) Rash              Family History   Problem Relation Age of Onset    Dementia Mother      Osteoporosis Mother      Alzheimer Mother      Cancer Father           Colon Cancer    Cancer Sister           Breast Cancer               Current Outpatient Medications   Medication Sig Dispense Refill    acetaminophen (Tylenol Extra Strength) 500 mg tablet Take 650 mg by mouth every six (6) hours as needed for Pain.        folic acid-vit G3-VCI W12 (FOLBIC) 2.5-25-2 mg tablet TAKE 1 TABLET BY MOUTH EVERY DAY        spironolactone (ALDACTONE) 100 mg tablet Take 100 mg by mouth daily.        chlorthalidone (HYGROTEN) 50 mg tablet Take 100 mg by mouth daily.        allopurinol (ZYLOPRIM) 300 mg tablet daily.        alendronate (FOSAMAX) 35 mg tablet Take 35 mg by mouth every seven (7) days.        DOCOSAHEXANOIC ACID/EPA (FISH OIL PO) Take  by mouth daily.        potassium citrate (UROCIT-K 10) 10 mEq TbER Take 20 mEq by mouth three (3) times daily (with meals).        fenofibrate nanocrystallized (TRICOR) 145 mg tablet Take  by mouth daily.          buPROPion SR (WELLBUTRIN SR) 150 mg SR tablet Take  by mouth daily. Indications: Smoking Cessation        simvastatin (ZOCOR) 40 mg tablet Take  by mouth nightly.          aspirin 81 mg tablet Take 81 mg by mouth daily.            Review of Systems     Constitutional: Fever:  negative  Skin: Rash:  negative  HEENT: Hearing difficulty:  negative  Eyes: Blurred vision:  negative  Cardiovascular: Chest pain:  negative  Respiratory: Shortness of breath:  negative  Gastrointestinal: Nausea/vomiting:  negative  Musculoskeletal: Back pain:  negative  Neurological: Weakness:  negative  Psychological: Memory loss:  negative  Comments/additional findings:  negative        PHYSICAL EXAMINATION:   Visit Vitals  Ht 5' 10\" (1.778 m)   Wt 218 lb (98.9 kg)   BMI 31.28 kg/m²     Constitutional: WDWN, Pleasant and appropriate affect, No acute distress. CV:  No peripheral swelling noted  Respiratory: No respiratory distress or difficulties  Abdomen:  No abdominal masses or tenderness.   No inguinal hernias noted.  Female:  No CVA tenderness. Large flank incision on the right   Skin: No jaundice. Neuro/Psych:  Alert and oriented x 3, affect appropriate. Lymphatic:   No enlarged inguinal lymph nodes.          REVIEW OF LABS AND IMAGING:             Lab Results   Component Value Date/Time     Sodium 135 (L) 04/20/2020 04:23 PM     Potassium 4.1 04/20/2020 04:23 PM     Chloride 100 04/20/2020 04:23 PM     CO2 30 04/20/2020 04:23 PM     Anion gap 5 04/20/2020 04:23 PM     Glucose 122 (H) 04/20/2020 04:23 PM     BUN 17 04/20/2020 04:23 PM     Creatinine 1.10 04/20/2020 04:23 PM     BUN/Creatinine ratio 15 04/20/2020 04:23 PM     GFR est AA 58 (L) 04/20/2020 04:23 PM     GFR est non-AA 48 (L) 04/20/2020 04:23 PM     Calcium 9.5 04/20/2020 04:23 PM              Lab Results   Component Value Date/Time     WBC 9.1 04/20/2020 04:23 PM     Hemoglobin, POC 10.9 (L) 12/17/2019 08:51 PM     HGB 14.2 04/20/2020 04:23 PM     Hematocrit, POC 32 (L) 12/17/2019 08:51 PM     HCT 41.6 04/20/2020 04:23 PM     PLATELET 903 29/34/8667 04:23 PM     .0 (H) 04/20/2020 04:23 PM        KUB 6/16/20  Impression:   Left renal and ureteral stones still remain, possible single fragment dislodged further along distal ureter, about 1mm          CT Scan abdomen and pelvis 1/07/2020:  IMPRESSION  1. Left mid ureter is similarly tethered at the caudal left pararenal fascia but there are new calcifications at this location. A few of the stones may be within the ureter but there is no upstream hydroureter. At least the largest calcification is suspected to be outside of the ureter and dystrophic. Bilateral nonobstructing nephrolithiasis otherwise. 2. The left kidney is dystrophic with lobulations and regions of cortical scarring and thickening, not well evaluated without IV contrast. The prior subcapsular hematoma has resolved. 3. Bilateral small hemorrhagic/proteinaceous cyst.  4. Mild colonic diverticulosis.   5. Hepatomegaly and mild steatosis. 6. Fibroid uterus with calcifications. Coarse cervical calcification. 7. Moderate atherosclerosis with likely multifocal regions of stenosis.       KUB 10/18/19  Impression:   Two calcifications in mid left kinky ureter, measuring 5-6mm each. Left upper pole 1 cm calcification and left lower pole 1cm calcification   Right hypertrophy but no obvious right renal calcifications  Fibroid uterus      CTU 7/23/18  IMPRESSION:     Multiple bilateral nonobstructing renal calculi are evident. No suspicious  masses or hydronephrosis     Calcified uterine leiomyoma     Chronic degenerative changes involving the hips, spine and SI joints     Multiple layering gallstones     Hepatic steatosis     FAHAD 7/5/2018: Impression:    Bilateral renal cysts. Bilateral non-obstructing stones. Left lobulated kidney. Non-fully distended bladder with bilateral urine jets. Volume = 46 cc's.     Renal US 3/13/2017   IMPRESSION:  1. Left renal calcification identified. No hydronephrosis is seen bilaterally. 2. Apparent asymmetric renal sizes which may be artifactual related to scanning  technique.     KUB 3/7/2017:  Impression:  Normal bowel gas pattern  16 x 4.6 mm calyceal stones in left upper pole  Left lower pole stones also seen  No ureteral stones  Calcified uterine fibroid     CT Abdomen and Pelvis 2/3/2015:   Right Kidney, Ureter: The right kidney demonstrates prominent  hydronephrosis. The right renal pelvis width is estimated at  about 6.0 cm. Additional tiny punctate intrarenal stones  identified in the right kidney. The right ureter is  asymmetrically dilated on the right down to the right distal  ureterovesical junction. The right distal ureteral vesicle  junction stone measures about 0.7 x 0.4 cm (image #79).  More  proximal to the right ureterovesical junction stone is a less  distinct subtle calcific density (image #78), measuring 0.5 x 0.5  cm.  This is still probably related to a distal ureteral stone  despite relatively decreased overall density compared to the more  well formed stone more inferiorly. Alternatively, precipitation  of calcium granules due to stasis of the urine may also be  considered. Left Kidney, Ureter: No evidence for left ureteral stone is  identified. The left ureter is of normal caliber. The left  renal pelvis is mildly prominent. The etiology for the renal  pelviectasis is unclear. There are multiple fairly prominent  internal stones in the left kidney the largest of the left  internal stones measures about 1.5 cm.      [facility] 8/10/2016  Urine Volume:                         4.44  SS CaOx                     6.58  Urine Calcium             424  Urine Oxalate              69  Urine Citrate                617  SS CaP                       2.17  24 Hour Urine pH        7.348  SS Uric Acid                0.02  Urine Uric Acid            0.943           A copy of today's office visit with all pertinent imaging results and labs were sent to the referring physician.          Nini Keen MD      History and physical review. The patient has been examined. There have been no significant clinical changes.     NAD, CTAB, RRR    Left Side marked  [facility] On call to [address] [place] [address] to proceed with Cysto, L RPG, LURS, laser litho, left stent    Nini Keen MD

## 2020-07-23 NOTE — PROCEDURES
Operative Note      Patient: Junior Strauss               Sex: female             MRN: [id number]      YOB: 1940      Age:  70 y.o. Preoperative Diagnosis: Kidney Stone N20.0    Postoperative Diagnosis:  Kidney Stone N20.0    Surgeon: Rebecca Tsang MD    Assistant : Home Ramos MD, RES    Anesthesia:  General    Procedure:    1) Cystoscopy  2) left Ureteroscopy  3) Laser lithotripsy with Basket removal of stone  4) Left retrograde pyelogram  5) left ureteral stent exchange   6) < 1 hour physician fluoroscopy imaging interpretation time    Estimated Blood Loss: minimal           Implants:   Implant Name Type Inv. Item Serial No.  Lot No. LRB No. Used Action   BARD INLAY OPTIMA  [id number]    BARD [id number] Left 1 Implanted     Specimens:   ID Type Source Tests Collected by Time Destination   1 : LEFT KIDNEY STONE   STONE ANALYSIS Ezequiel Deleon MD 7/23/2020  3:24 PM Other (See Notes)      Drains: None           Complications:  None          Counts: Sponge and needle counts were correct times two. Operative Indication: This is a 70 y.o. female with a history of left urolithiasis. Her stone burden included one 6mm mid ureteral stones and multiple left renal stones. She is s/p multiple URS and ESWL. After the risks, benefits, alternatives, and complications were discussed they present now for operative management. Findings:   1. Capacious and edematous left ureter. Left 6mm ureteral stone in mid ureter in redundant pouch of mucosa. 2. Large collecting system with multiple stones in upper and lower pole, largest 8-10mm. Fragmented. 3. Retrograde pyelogram delineating collecting system after laser fragmentation, no extravasation. 4. Final fluoro with stent in appropriate position with proximal coil in renal pelvis and distal coil in bladder. Procedure in Detail: The patient was brought to the operative suite.   Anesthesia was induced and preoperative antibiotics were administered. They were then placed in the dorsal lithotomy position and their external genitalia was prepped and draped in the usual fashion. A surgical timeout was performed confirming the patient's name, date of birth, laterality, and antibiotics. All were in agreement. A 22 [place] cystourethroscope was then inserted into the patients bladder. The urethra revealed no abnormalities. No bladder abnormalities were noted on cystoscopy. The left stent was in place and was slightly encrusted. A sensor wire was then passed up the left ureteral orifice and up the kidney using fluoroscopic guidance along side the indwelling stent. The stent was removed. The senor wire was secured as a safety wire. A semirigid ureteroscope was then passed up the left ureteral orifice and into the ureter. The ureter was then examined up to the renal pelvis. The ureter was capacious and edematous. A 6mm ureteral stone was then encountered in the mid ureter in a redundant segment. A 272 micron laser fiber was introduced and the stone was fragmented into small pieces. A zero tip nittinol basket was used to removed all the fragments from the ureter. In order to get a second wire up to the renal pelvis the ureteroscope was removed and an open ended catheter was used to exchange the sensor for a super stiff wire. A dual lumen was used to introduce a second wire using fluoroscopic guidance. A 12/14 access sheath was then passed over the superstiff wire and up to the proximal ureter using fluroscopic guidance. The inner stylet and wire were removed. A flexible ureteroscope was passed up to the proximal ureter. Thorough pyeloscopy was performed. Stone burden was noted in the upper pole and lower pole, multiple stones with largest up to approximately 8-10mm. The laser was introduced into the kidney and the stone burden was laser fragmented. A basket was then used to remove the stone burden.  The remainder of fragments were quite small and irrigated out of the upper pole calyx. Contrast was injected in a retrograde fashion through the scope to map the calyces. The ureteroscope was used to examine the ureter in a retrograde fashion as the access sheath was removed. There was no evidence of ureteral perforation. A [id number] stent was then placed in the standard fashion over our sensor wire. Excellent coil was noted in the kidney and bladder on fluoroscopy. Wire was left attached to the stent and secured to the inner thigh. The patient was then awoken and transferred to the recovery room in stable condition. Dr. Blanca Inman was present and scrubbed for entire procedure. Plan: She will remove the stent on sting in 5 days.        Sarah Mishra MD  7/23/2020

## 2020-07-23 NOTE — DISCHARGE INSTRUCTIONS
Patient Education        Laser Lithotripsy: What to Expect at [address] lithotripsy is a way to treat kidney stones. This treatment uses a laser to break kidney stones into tiny pieces. For several hours after the procedure you may have a burning feeling when you urinate. You may feel the urge to go even if you don't need to. This feeling should go away within a day. Drinking a lot of water can help. Your doctor also may advise you to take medicine that numbs the burning. This medicine is called phenazopyridine. It is available by prescription and over the counter. Brand names include Pyridium and Uristat. Your doctor may prescribe an antibiotic. This will help prevent an infection. You may have some blood in your urine for 2 or 3 days. Your doctor may have placed a small tube inside one of your ureters. Ureters are the tubes that connect the kidneys to the bladder. The small tube the doctor may have placed is called a stent. It may help the stone fragments pass through your body. Your doctor may remove the stent in a few weeks. Most stone fragments that are not removed pass out of the body within 24 hours. But sometimes it can take many weeks. If you have a large stone, you may need to come back for more treatments. This care sheet gives you a general idea about how long it will take for you to recover. But each person recovers at a different pace. Follow the steps below to feel better as quickly as possible. How can you care for yourself at home? Activity  · Rest as much as you need to after you go home. · You may do your regular activities. But avoid hard exercise or sports for about a week or until there is no blood in your urine. Diet  · You can eat your normal diet after lithotripsy. · Continue to drink plenty of fluids, enough so that your urine is light yellow or clear like water.  If you have kidney, heart, or liver disease and have to limit fluids, talk with your doctor before you increase the amount of fluids you drink. Medicines  · Your doctor will tell you if and when you can restart your medicines. He or she will also give you instructions about taking any new medicines. · If you take aspirin or some other blood thinner, ask your doctor if and when to start taking it again. Make sure that you understand exactly what your doctor wants you to do. · If you take medicine to stop the burning when you urinate, take it exactly as recommended. Call your doctor if you think you are having a problem with your medicine. This medicine may color your urine orange or red. This is normal. You will get more details on the specific medicine your doctor recommends. · If your doctor prescribed antibiotics, take them as directed. Do not stop taking them just because you feel better. You need to take the full course of antibiotics. · Be safe with medicines. Read and follow all instructions on the label. ? If the doctor gave you a prescription medicine for pain, take it as prescribed. ? If you are not taking a prescription pain medicine, ask your doctor if you can take acetaminophen (Tylenol). Do not take ibuprofen (Advil, Motrin) or naproxen (Aleve) or similar medicines unless your doctor tells you to. ? Do not take two or more pain medicines at the same time unless the doctor told you to. Many pain medicines have acetaminophen, which is Tylenol. Too much acetaminophen (Tylenol) can be harmful. Heat  · Take a warm bath. This may soothe the burning. Other instructions  · Urinate through the strainer the doctor gives you. Save any stone pieces, including those that look like sand or gravel. Take these to your doctor. This will help your doctor find the cause of your stones. Follow-up care is a key part of your treatment and safety. Be sure to make and go to all appointments, and call your doctor if you are having problems.  It's also a good idea to know your test results and keep a list of the medicines you take. When should you call for help? [id number] anytime you think you may need emergency care. For example, call if:  · You passed out (lost consciousness). · You have chest pain, are short of breath, or cough up blood. Call your doctor now or seek immediate medical care if:  · You have pain that does not get better after you take pain medicine. · You have new or more blood clots in your urine. (It is normal for the urine to be pink for a few days.)  · You cannot urinate. · You have symptoms of a urinary tract infection. These may include:  ? Pain or burning when you urinate. ? A frequent need to urinate without being able to pass much urine. ? Pain in the flank, which is just below the rib cage and above the waist on either side of the back. ? Blood in the urine. ? A fever. · You are sick to your stomach or cannot drink fluids. · You have signs of a blood clot in your leg (called a deep vein thrombosis), such as:  ? Pain in the calf, back of the knee, thigh, or groin. ? Redness and swelling in your leg. Watch closely for any changes in your health, and be sure to contact your doctor if you have any problems. Where can you learn more? Go to http://leandro-michael.info/  Enter Q239 in the search box to learn more about \"Laser Lithotripsy: What to Expect at Home. \"  Current as of: August 12, 2019               Content Version: 12.5  © 3367-4944 [employer] Care instructions adapted under license by [employer] (which disclaims liability or warranty for this information). If you have questions about a medical condition or this instruction, always ask your healthcare professional. [place] [address] any warranty or liability for your use of this information. Patient Education        Cystoscopy: What to Expect at [address]     A cystoscopy is a procedure that lets a doctor look inside of the bladder and the urethra.  The urethra is the tube that carries urine from the bladder to outside the body. The doctor uses a thin, lighted tool called a cystoscope. Your bladder is filled with fluid. This stretches the bladder so that your doctor can look closely at the inside of your bladder. After the cystoscopy, your urethra may be sore at first, and it may burn when you urinate for the first few days after the procedure. You may feel the need to urinate more often, and your urine may be pink. These symptoms should get better in 1 or 2 days. You will probably be able to go back to most of your usual activities in 1 or 2 days. This care sheet gives you a general idea about how long it will take for you to recover. But each person recovers at a different pace. Follow the steps below to get better as quickly as possible. How can you care for yourself at home? Activity  · Rest when you feel tired. Getting enough sleep will help you recover. · Try to walk each day. Start by walking a little more than you did the day before. Bit by bit, increase the amount you walk. Walking boosts blood flow and helps prevent pneumonia and constipation. · Avoid strenuous activities, such as bicycle riding, jogging, weight lifting, or aerobic exercise, until your doctor says it is okay. · Ask your doctor when you can drive again. · Most people are able to return to work within 1 or 2 days after the procedure. · You may shower and take baths as usual.  · Ask your doctor when it is okay for you to have sex. Diet  · You can eat your normal diet. If your stomach is upset, try bland, low-fat foods like plain rice, broiled chicken, toast, and yogurt. · Drink plenty of fluids (unless your doctor tells you not to). Medicines  · Take pain medicines exactly as directed. ? If the doctor gave you a prescription medicine for pain, take it as prescribed.   ? If you are not taking a prescription pain medicine, ask your doctor if you can take an over-the-counter medicine. · If you think your pain medicine is making you sick to your stomach:  ? Take your medicine after meals (unless your doctor has told you not to). ? Ask your doctor for a different pain medicine. · If your doctor prescribed antibiotics, take them as directed. Do not stop taking them just because you feel better. You need to take the full course of antibiotics. Follow-up care is a key part of your treatment and safety. Be sure to make and go to all appointments, and call your doctor if you are having problems. It's also a good idea to know your test results and keep a list of the medicines you take. When should you call for help? [id number] anytime you think you may need emergency care. For example, call if:  · You passed out (lost consciousness). · You have severe trouble breathing. · You have sudden chest pain and shortness of breath, or you cough up blood. · You have severe belly pain. Call your doctor now or seek immediate medical care if:  · You are sick to your stomach or cannot keep fluids down. · Your urine is still red or you see blood clots after you have urinated several times. · You have trouble passing urine or stool, especially if you have pain or swelling in your lower belly. · You have signs of a blood clot, such as:  ? Pain in your calf, back of the knee, thigh, or groin. ? Redness and swelling in your leg or groin. · You develop a fever or severe chills. · You have pain in your back just below your rib cage. This is called flank pain. Watch closely for changes in your health, and be sure to contact your doctor if:  · You have pain or burning when you urinate. A burning feeling is normal for a day or two after the test, but call if it does not get better. · You have a frequent urge to urinate but can pass only small amounts of urine. · Your urine is pink, red, or cloudy, or smells bad.  It is normal for the urine to have a pinkish color for a few days after the test, but call if it does not get better. Where can you learn more? Go to http://leandro-michael.info/  Enter C842 in the search box to learn more about \"Cystoscopy: What to Expect at Home. \"  Current as of: August 22, 2019               Content Version: 12.5  © 2397-2081 [employer]. Care instructions adapted under license by [employer] (which disclaims liability or warranty for this information). If you have questions about a medical condition or this instruction, always ask your healthcare professional. [address] any warranty or liability for your use of this information. Patient Education        Learning About Coronavirus [phone])  Coronavirus [phone]): Overview  What is coronavirus ([id number])? The coronavirus disease (COVID-19) is caused by a virus. It is an illness that was first found in [place], [place], in December 2019. It has since spread worldwide. The virus can cause fever, cough, and trouble breathing. In severe cases, it can cause pneumonia and make it hard to breathe without help. It can cause death. Coronaviruses are a large group of viruses. They cause the common cold. They also cause more serious illnesses like Middle East respiratory syndrome (MERS) and severe acute respiratory syndrome (SARS). COVID-19 is caused by a novel coronavirus. That means it's a new type that has not been seen in people before. This virus spreads person-to-person through droplets from coughing and sneezing. It can also spread when you are close to someone who is infected. And it can spread when you touch something that has the virus on it, such as a doorknob or a tabletop. What can you do to protect yourself from coronavirus (COVID-19)? The best way to protect yourself from getting sick is to:  · Avoid areas where there is an outbreak. · Avoid contact with people who may be infected.   · Wash your hands often with soap or alcohol-based hand sanitizers. · Avoid crowds and try to stay at least 6 feet away from other people. · Wash your hands often, especially after you cough or sneeze. Use soap and water, and scrub for at least 20 seconds. If soap and water aren't available, use an alcohol-based hand . · Avoid touching your mouth, nose, and eyes. What can you do to avoid spreading the virus to others? To help avoid spreading the virus to others:  · Cover your mouth with a tissue when you cough or sneeze. Then throw the tissue in the trash. · Use a disinfectant to clean things that you touch often. · Wear a cloth face cover if you have to go to public areas. · Stay home if you are sick or have been exposed to the virus. Don't go to school, work, or public areas. And don't use public transportation, ride-shares, or taxis unless you have no choice. · If you are sick:  ? Leave your home only if you need to get medical care. But call the doctor's office first so they know you're coming. And wear a face cover. ? Wear the face cover whenever you're around other people. It can help stop the spread of the virus when you cough or sneeze. ? Clean and disinfect your home every day. Use household  and disinfectant wipes or sprays. Take special care to clean things that you grab with your hands. These include doorknobs, remote controls, phones, and handles on your refrigerator and microwave. And don't forget countertops, tabletops, bathrooms, and computer keyboards. When to call for help  [id number] anytime you think you may need emergency care. For example, call if:  · You have severe trouble breathing. (You can't talk at all.)  · You have constant chest pain or pressure. · You are severely dizzy or lightheaded. · You are confused or can't think clearly. · Your face and lips have a blue color. · You pass out (lose consciousness) or are very hard to wake up.   Call your doctor now if you develop symptoms such as:  · Shortness of breath. · Fever. · Cough. If you need to get care, call ahead to the doctor's office for instructions before you go. Make sure you wear a face cover to prevent exposing other people to the virus. Where can you get the latest information? The following health organizations are tracking and studying this virus. Their websites contain the most up-to-date information. Andie Lay also learn what to do if you think you may have been exposed to the virus. · U.S. Centers for Disease Control and Prevention (CDC): The CDC provides updated news about the disease and travel advice. The website also tells you how to prevent the spread of infection. www.cdc.gov  · World Health Organization [facility]): WHO offers information about the virus outbreaks. WHO also has travel advice. www.who.int  Current as of: May 8, 2020               Content Version: 12.5  © 2401-8470 [employer]. Care instructions adapted under license by [employer] (which disclaims liability or warranty for this information). If you have questions about a medical condition or this instruction, always ask your healthcare professional. [address] any warranty or liability for your use of this information.

## 2020-07-23 NOTE — PERIOP NOTES
Patient tracked to Phase 2. Vitals signs monitoring initiated. Report  received from Tessa Hector    Patient oriented to Person , Place, Time, Situation. Patient denies complaints of nausea and dizziness. Patient tolerated PO fluids. Patient ambulated from stretcher to chair with minimal assistance. IV removed. Patient dressed with home clothes. Reviewed discharge instructions. Point of contact   given verbal instructions via phone due to COVID procedures. Patient transported to vehicle via wheel chair.

## 2020-07-23 NOTE — ANESTHESIA POSTPROCEDURE EVALUATION
Procedure(s):  1) Cystoscopy 2) left Ureteroscopy 3) Laser lithotripsy with Basket removal of stone 4) Left retrograde pyelogram 5) left ureteral stent exchange  6) < 1 hour physician fluoroscopy imaging interpretation time . general    Anesthesia Post Evaluation      Multimodal analgesia: multimodal analgesia used between 6 hours prior to anesthesia start to PACU discharge  Patient location during evaluation: bedside  Patient participation: complete - patient participated  Level of consciousness: awake  Pain management: adequate  Airway patency: patent  Anesthetic complications: no  Cardiovascular status: stable  Respiratory status: acceptable  Hydration status: acceptable  Post anesthesia nausea and vomiting:  controlled      INITIAL Post-op Vital signs:   Vitals Value Taken Time   BP 92/50 7/23/2020  4:16 PM   Temp 36.6 °C (97.9 °F) 7/23/2020  3:43 PM   Pulse 75 7/23/2020  4:26 PM   Resp 20 7/23/2020  4:26 PM   SpO2 94 % 7/23/2020  4:26 PM   Vitals shown include unvalidated device data.

## 2020-07-23 NOTE — ANESTHESIA PREPROCEDURE EVALUATION
Relevant Problems   No relevant active problems       Anesthetic History   No history of anesthetic complications            Review of Systems / Medical History  Patient summary reviewed and pertinent labs reviewed    Pulmonary  Within defined limits                 Neuro/Psych   Within defined limits           Cardiovascular                  Exercise tolerance: >4 METS     GI/Hepatic/Renal         Renal disease: stones       Endo/Other        Arthritis     Other Findings              Physical Exam    Airway  Mallampati: III  TM Distance: 4 - 6 cm  Neck ROM: decreased range of motion   Mouth opening: Diminished (comment)     Cardiovascular    Rhythm: regular           Dental    Dentition: Poor dentition     Pulmonary  Breath sounds clear to auscultation               Abdominal         Other Findings            Anesthetic Plan    ASA: 2  Anesthesia type: general          Induction: Intravenous  Anesthetic plan and risks discussed with: Patient

## 2020-08-07 LAB
CALCIUM OXALATE DIHYDRATE MFR STONE IR: 10 %
CARBONATE APATITE: 10 %
COLOR STONE: NORMAL
COM MFR STONE: 15 %
COMMENT, 519994: NORMAL
COMPOSITION, 120440: NORMAL
DISCLAIMER, STO32L: NORMAL
HYDROXYAPATITE: 65 %
PHOTO, 120675: NORMAL
PLEASE NOTE, 130422: NORMAL
SIZE STONE: NORMAL MM
SPECIMEN SOURCE: NORMAL
STONE ANALYSIS-IMP: NORMAL
WT STONE: 69 MG

## 2023-08-10 ENCOUNTER — HOSPITAL ENCOUNTER (OUTPATIENT)
Facility: HOSPITAL | Age: 83
Setting detail: RECURRING SERIES
Discharge: HOME | End: 2023-08-13
Payer: MEDICARE

## 2023-08-10 PROCEDURE — 97161 PT EVAL LOW COMPLEX 20 MIN: CPT

## 2023-08-10 PROCEDURE — 97530 THERAPEUTIC ACTIVITIES: CPT

## 2023-08-10 NOTE — PROGRESS NOTES
PT DAILY TREATMENT NOTE/COMBO EVAL       Patient Name: Coleman Becerra    Date: 8/10/2023    : 1940  Insurance: Payor: MEDICARE / Plan: MEDICARE PART A AND B / Product Type: *No Product type* /      Patient  verified yes     Visit #   Current / Total 1 10   Time   In / Out 3:05 3:50   Pain   In / Out 7 5   Subjective Functional Status/Changes: See POC   Changes to:  Meds, Allergies, Med Hx, Sx Hx? If yes, update Summary List yes, see POC     Treatment Area: Right hip pain [M25.551]  Neck pain [M54.2]  Chronic pain of right upper extremity [M79.601, G89.29]    SUBJECTIVE    CC: neck pain, right UE pain, impaired gait/mobility  History/Mechanism of Injury: insidious onset  Current Symptoms/Complaints: neck pain extending into right UE- can feel numbness/tingling into right- inc pain with combing hair, putting on eye pencil  Right lower back pain- 7/10, aching sharp pain- lateral aspect of hip  Difficulty walking  Pain-  Current: 7/10     Worst: 10/10   Best: 0/10  Aggravated By: prolonged standing  Alleviated By: ice, arthritis tylenols, rest  PMHx/Surgical Hx: hx of kidney stones, arthritis  Living Situation: lives in [facility] alone ( recently passed away in January)  1 story condo with elevator  Hobbies: walking around her condo  PLOF: functionally independent  Limitations to PLOF: difficulty feeling safe during gait  Pt Goals: improve comfort when walking, improve balance    OBJECTIVE/EXAMINATION    30 min [x]Eval  - untimed                 Therapeutic Procedures: Tx Min Billable or 1:1 Min (if diff from Tx Min) Procedure, Rationale, Specifics   15 78 18930 Therapeutic Activity (timed):  use of dynamic activities replicating functional movements to increase ROM, strength, coordination, balance, and proprioception in order to improve patient's ability to progress to PLOF and address remaining functional goals.   (see flow sheet as applicable)     Details if applicable:  Patient education on
tests and measures addressin body structure, function, activity limitation and / or participation in recreation  ;Presentation:  LOW Complexity : Stable, uncomplicated  ;Clinical Decision Making:  MEDIUM Complexity : FOTO score of 26-74 FOTO score = an established functional score where 100 = no disability  Overall Complexity Rating: LOW   Problem List: pain affecting function, decrease ROM, decrease strength, impaired gait/balance, decrease ADL/functional abilities, decrease activity tolerance, decrease flexibility/joint mobility, and decrease transfer abilities    Treatment Plan may include any combination of the followin Therapeutic Exercise, 29314 Neuromuscular Re-Education, 28971 Manual Therapy, 14208 Therapeutic Activity, 17039 Self Care/Home Management, 71236 Electrical Stim unattended, 66664 Gait Training, and 00603 Mechanical Traction If patient is receiving VASO: Vasopnuematic compression justification:  Per bilateral girth measures taken and listed above the edema is considered significant and having an impact on the patient's strength, balance, gait, transfers, self care, and ADL's  Patient / Family readiness to learn indicated by: asking questions and interest  Persons(s) to be included in education: patient (P)  Barriers to Learning/Limitations: none   Measures taken if barriers to learning present: N/A  Patient Goal (s): improve balance  Patient Self Reported Health Status: good  Rehabilitation Potential: good    Goals:  Short Term Goals: To be accomplished in 4 weeks  - Goal: Pt to be compliant with initial HEP to improve ability to independently address symptoms and functional deficits. Status at last note/certification: Established and reviewed with Pt  - Goal: Pt to demo B cervical rotation AROM of at least 50 deg to improve ease with driving.   Status at last note/certification: right 38 deg, left 34 deg  - Goal: Pt to demo right shoulder flexion AROM of at least 140 deg to improve ease

## 2023-08-16 ENCOUNTER — HOSPITAL ENCOUNTER (OUTPATIENT)
Facility: HOSPITAL | Age: 83
Setting detail: RECURRING SERIES
Discharge: HOME | End: 2023-08-19
Payer: MEDICARE

## 2023-08-16 PROCEDURE — 97112 NEUROMUSCULAR REEDUCATION: CPT

## 2023-08-16 PROCEDURE — 97110 THERAPEUTIC EXERCISES: CPT

## 2023-08-16 NOTE — PROGRESS NOTES
PHYSICAL / OCCUPATIONAL THERAPY - DAILY TREATMENT NOTE (updated )    Patient Name: Yolis Terrell    Date: 2023    : 1940  Insurance: Payor: MEDICARE / Plan: MEDICARE PART A AND B / Product Type: *No Product type* /      Patient  verified yes     Visit #   Current / Total 2 10 Total Time   Time   In / Out 2:45 3:26 41   Pain   In / Out 0 0    Subjective Functional Status/Changes: I had some food poisoning in the past few days so I was not able to work on my exercises each day. TREATMENT AREA =  Right hip pain [M25.551]  Neck pain [M54.2]  Chronic pain of right upper extremity [M79.601, G89.29]    OBJECTIVE    Therapeutic Procedures:  41  Total 41  Total [facility] Totals Reminder: bill using total billable min of TIMED therapeutic procedures (example: do not include dry needle or estim unattended, both untimed codes, in totals to left)  8-22 min = 1 unit; 23-37 min = 2 units; 38-52 min = 3 units; 53-67 min = 4 units; 68-82 min = 5 units   Tx Min Billable or 1:1 Min (if diff from Tx Min) Procedure, Rationale, Specifics   15 15 10736 Therapeutic Exercise (timed):  increase ROM, strength, coordination, balance, and proprioception to improve patient's ability to progress to PLOF and address remaining functional goals. (see flow sheet as applicable)   Details if applicable:        59825 Neuromuscular Re-Education (timed):  improve balance, coordination, kinesthetic sense, posture, core stability and proprioception to improve patient's ability to develop conscious control of individual muscles and awareness of position of extremities in order to progress to PLOF and address remaining functional goals.  (see flow sheet as applicable)     Details if applicable:         [x]  Patient Education billed concurrently with other procedures   [x] Review HEP    [] Progressed/Changed HEP, detail:    [] Other detail:       Objective Information/Functional Measures/Assessment    - Focus of today's session on

## 2023-08-18 ENCOUNTER — HOSPITAL ENCOUNTER (OUTPATIENT)
Facility: HOSPITAL | Age: 83
Setting detail: RECURRING SERIES
Discharge: HOME | End: 2023-08-21
Payer: MEDICARE

## 2023-08-18 PROCEDURE — 97110 THERAPEUTIC EXERCISES: CPT

## 2023-08-18 PROCEDURE — 97530 THERAPEUTIC ACTIVITIES: CPT

## 2023-08-18 PROCEDURE — 97112 NEUROMUSCULAR REEDUCATION: CPT

## 2023-08-18 NOTE — PROGRESS NOTES
PHYSICAL / OCCUPATIONAL THERAPY - DAILY TREATMENT NOTE (updated )    Patient Name: Tuan Iverson    Date: 2023    : 1940  Insurance: Payor: MEDICARE / Plan: MEDICARE PART A AND B / Product Type: *No Product type* /      Patient  verified yes     Visit #   Current / Total 3 10 Total Time   Time   In / Out 1:42 2:23 41   Pain   In / Out 4-5 0    Subjective Functional Status/Changes: I had some tingling in my right arm this morning. TREATMENT AREA =  Right hip pain [M25.551]  Neck pain [M54.2]  Chronic pain of right upper extremity [M79.601, G89.29]    OBJECTIVE      Therapeutic Procedures:  41  Total 40  Total [facility] Totals Reminder: bill using total billable min of TIMED therapeutic procedures (example: do not include dry needle or estim unattended, both untimed codes, in totals to left)  8-22 min = 1 unit; 23-37 min = 2 units; 38-52 min = 3 units; 53-67 min = 4 units; 68-82 min = 5 units   Tx Min Billable or 1:1 Min (if diff from Tx Min) Procedure, Rationale, Specifics   15 14 16527 Therapeutic Exercise (timed):  increase ROM, strength, coordination, balance, and proprioception to improve patient's ability to progress to PLOF and address remaining functional goals. (see flow sheet as applicable)   Details if applicable:       10 10 29866 Therapeutic Activity (timed):  use of dynamic activities replicating functional movements to increase ROM, strength, coordination, balance, and proprioception in order to improve patient's ability to progress to PLOF and address remaining functional goals.   (see flow sheet as applicable)   Details if applicable:  sit<>stand training     16 16 96722 Neuromuscular Re-Education (timed):  improve balance, coordination, kinesthetic sense, posture, core stability and proprioception to improve patient's ability to develop conscious control of individual muscles and awareness of position of extremities in order to progress to PLOF and address remaining functional

## 2023-08-21 ENCOUNTER — HOSPITAL ENCOUNTER (OUTPATIENT)
Facility: HOSPITAL | Age: 83
Setting detail: RECURRING SERIES
Discharge: HOME | End: 2023-08-24
Payer: MEDICARE

## 2023-08-21 PROCEDURE — 97530 THERAPEUTIC ACTIVITIES: CPT | Performed by: PHYSICAL THERAPIST

## 2023-08-21 PROCEDURE — 97110 THERAPEUTIC EXERCISES: CPT | Performed by: PHYSICAL THERAPIST

## 2023-08-21 PROCEDURE — 97112 NEUROMUSCULAR REEDUCATION: CPT | Performed by: PHYSICAL THERAPIST

## 2023-08-21 NOTE — PROGRESS NOTES
PHYSICAL / OCCUPATIONAL THERAPY - DAILY TREATMENT NOTE (updated )    Patient Name: Radha Argueta    Date: 2023    : 1940  Insurance: Payor: MEDICARE / Plan: MEDICARE PART A AND B / Product Type: *No Product type* /      Patient  verified yes     Visit #   Current / Total 4 10 Total Time   Time   In / Out 1222pm 107pm 45   Pain   In / Out 6 4    Subjective Functional Status/Changes: This morning my R hand was numb//tingling for about 25min. TREATMENT AREA =  Right hip pain [M25.551]  Neck pain [M54.2]  Chronic pain of right upper extremity [M79.601, G89.29]    OBJECTIVE      Therapeutic Procedures:  45  Total 40  Total  [facility] Totals Reminder: bill using total billable min of TIMED therapeutic procedures (example: do not include dry needle or estim unattended, both untimed codes, in totals to left)  8-22 min = 1 unit; 23-37 min = 2 units; 38-52 min = 3 units; 53-67 min = 4 units; 68-82 min = 5 units   Tx Min Billable or 1:1 Min (if diff from Tx Min) Procedure, Rationale, Specifics   15 10 24742 Therapeutic Exercise (timed):  increase ROM, strength, coordination, balance, and proprioception to improve patient's ability to progress to PLOF and address remaining functional goals. (see flow sheet as applicable)   Details if applicable:       10 10 55308 Therapeutic Activity (timed):  use of dynamic activities replicating functional movements to increase ROM, strength, coordination, balance, and proprioception in order to improve patient's ability to progress to PLOF and address remaining functional goals.   (see flow sheet as applicable)   Details if applicable:  sit<>stand training     20 20 69330 Neuromuscular Re-Education (timed):  improve balance, coordination, kinesthetic sense, posture, core stability and proprioception to improve patient's ability to develop conscious control of individual muscles and awareness of position of extremities in order to progress to PLOF and address remaining

## 2023-08-24 ENCOUNTER — HOSPITAL ENCOUNTER (OUTPATIENT)
Facility: HOSPITAL | Age: 83
Setting detail: RECURRING SERIES
Discharge: HOME | End: 2023-08-27
Payer: MEDICARE

## 2023-08-24 PROCEDURE — 97110 THERAPEUTIC EXERCISES: CPT

## 2023-08-24 PROCEDURE — 97530 THERAPEUTIC ACTIVITIES: CPT

## 2023-08-24 PROCEDURE — 97535 SELF CARE MNGMENT TRAINING: CPT

## 2023-08-24 PROCEDURE — 97112 NEUROMUSCULAR REEDUCATION: CPT

## 2023-08-24 NOTE — PROGRESS NOTES
Daniel Pugh [facility]   10/5/2023  2:20 PM D[facility]ane Goltz[facility]

## 2023-08-28 ENCOUNTER — HOSPITAL ENCOUNTER (OUTPATIENT)
Facility: HOSPITAL | Age: 83
Setting detail: RECURRING SERIES
End: 2023-08-28
Payer: MEDICARE

## 2023-08-30 ENCOUNTER — HOSPITAL ENCOUNTER (OUTPATIENT)
Facility: HOSPITAL | Age: 83
Setting detail: RECURRING SERIES
Discharge: HOME | End: 2023-09-02
Payer: MEDICARE

## 2023-08-30 PROCEDURE — 97530 THERAPEUTIC ACTIVITIES: CPT

## 2023-08-30 PROCEDURE — 97112 NEUROMUSCULAR REEDUCATION: CPT

## 2023-08-30 PROCEDURE — 97110 THERAPEUTIC EXERCISES: CPT

## 2023-08-30 NOTE — PROGRESS NOTES
progress to PLOF and address remaining functional goals. (see flow sheet as applicable)     Details if applicable: Cervical rotation w/ ball on wall to improve cervical proprioception         [x]  Patient Education billed concurrently with other procedures   [x] Review HEP    [] Progressed/Changed HEP, detail:    [] Other detail:       Objective Information/Functional Measures/Assessment    Pt seen for PT today addressing neck pain and mobility deficits, hip pain/strength, and balance. Pt demonstrates improved balance on unsteady surface due to demonstrating less lateral sway and using unilateral support twice during 30\" rep. Incorporated additional balance interventions such as, tandem stance and lawson taps to improve narrow LUCINA and single leg stance. Pt demonstrated improvements in cervical rotation with right increasing to 41 degrees and left increasing to 40 degrees. Patient will continue to benefit from skilled PT / OT services to modify and progress therapeutic interventions, analyze and address functional mobility deficits, analyze and address ROM deficits, analyze and address strength deficits, analyze and address soft tissue restrictions, analyze and cue for proper movement patterns, analyze and modify for postural abnormalities, analyze and address imbalance/dizziness, and instruct in home and community integration to address functional deficits and attain remaining goals. Progress toward goals / Updated goals:  []  See Progress Note/Recertification    Short Term Goals: To be accomplished in 4 weeks  - Goal: Pt to be compliant with initial HEP to improve ability to independently address symptoms and functional deficits. Status at last note/certification: Established and reviewed with Pt  Current: progressing, intermittent compliance reported (8/24/2023)   - Goal: Pt to demo B cervical rotation AROM of at least 50 deg to improve ease with driving.   Status at last note/certification: right 38 deg,

## 2023-09-05 ENCOUNTER — HOSPITAL ENCOUNTER (OUTPATIENT)
Facility: HOSPITAL | Age: 83
Setting detail: RECURRING SERIES
Discharge: HOME | End: 2023-09-08
Payer: MEDICARE

## 2023-09-05 PROCEDURE — 97112 NEUROMUSCULAR REEDUCATION: CPT

## 2023-09-05 NOTE — PROGRESS NOTES
PHYSICAL / OCCUPATIONAL THERAPY - DAILY TREATMENT NOTE (updated )    Patient Name: Adelso Pitt    Date: 2023    : 1940  Insurance: Payor: MEDICARE / Plan: MEDICARE PART A AND B / Product Type: *No Product type* /      Patient  verified yes     Visit #   Current / Total 7 10 Total Time   Time   In / Out 3:40 4:35 55   Pain   In / Out 5 2    Subjective Functional Status/Changes: Pt reports she feels her balance is better. TREATMENT AREA =  Right hip pain [M25.551]  Neck pain [M54.2]  Chronic pain of right upper extremity [M79.601, G89.29]    OBJECTIVE    Modalities Rationale:     decrease pain and increase tissue extensibility to improve patient's ability to progress to PLOF and address remaining functional goals. 10 min  unbill []  Ice     [x]  Heat    location/position: Right shoulder/UT; reclined   Skin assessment post-treatment (if applicable):    [x]  intact    []  redness- no adverse reaction                 []redness - adverse reaction:          Therapeutic Procedures: Tx Min Billable or 1:1 Min (if diff from Tx Min) Procedure, Rationale, Specifics   5 0 25664 Therapeutic Exercise (timed):  increase ROM, strength, coordination, balance, and proprioception to improve patient's ability to progress to PLOF and address remaining functional goals. (see flow sheet as applicable)     Details if applicable:       40 40 59842 Neuromuscular Re-Education (timed):  improve balance, coordination, kinesthetic sense, posture, core stability and proprioception to improve patient's ability to develop conscious control of individual muscles and awareness of position of extremities in order to progress to PLOF and address remaining functional goals.  (see flow sheet as applicable)     Details if applicable:       [facility] [address] [facility] Reminder: bill using total billable min of TIMED therapeutic procedures (example: do not include dry needle or estim unattended, both untimed codes, in totals to

## 2023-09-08 ENCOUNTER — HOSPITAL ENCOUNTER (OUTPATIENT)
Facility: HOSPITAL | Age: 83
Setting detail: RECURRING SERIES
Discharge: HOME | End: 2023-09-11
Payer: MEDICARE

## 2023-09-08 PROCEDURE — 97112 NEUROMUSCULAR REEDUCATION: CPT

## 2023-09-08 PROCEDURE — 97110 THERAPEUTIC EXERCISES: CPT

## 2023-09-08 PROCEDURE — 97530 THERAPEUTIC ACTIVITIES: CPT

## 2023-09-08 NOTE — PROGRESS NOTES
PHYSICAL / OCCUPATIONAL THERAPY - DAILY TREATMENT NOTE (updated )    Patient Name: Jodi Humphries    Date: 2023    : 1940  Insurance: Payor: MEDICARE / Plan: MEDICARE PART A AND B / Product Type: *No Product type* /      Patient  verified yes     Visit #   Current / Total 8 10 Total Time   Time   In / Out 1:04 PM 2:10 PM 66   Pain   In / Out 0/10 0/10    Subjective Functional Status/Changes: \"I am having a lot of stiffness. Things with PT seem to be going great. \"      TREATMENT AREA =  Right hip pain [M25.551]  Neck pain [M54.2]  Chronic pain of right upper extremity [M79.601, G89.29]    OBJECTIVE    Modalities Rationale:     decrease pain and increase tissue extensibility to improve patient's ability to progress to PLOF and address remaining functional goals. 10 min  unbill []  Ice     [x]  Heat    location/position: Right shoulder/UT; reclined   Skin assessment post-treatment (if applicable):    [x]  intact    []  redness- no adverse reaction                 []redness - adverse reaction:          Therapeutic Procedures: Tx Min  56 Billable or 1:1 Min (if diff from Tx Min) Procedure, Rationale, Specifics   23 23 00025 Therapeutic Exercise (timed):  increase ROM, strength, coordination, balance, and proprioception to improve patient's ability to progress to PLOF and address remaining functional goals. (see flow sheet as applicable)     Details if applicable:  UT stretch, LS stretch, doorway stretch, cervical AROM, shoulder rolls, OH AAROM flexion, bridges, SLR      17 17 80777 Neuromuscular Re-Education (timed):  improve balance, coordination, kinesthetic sense, posture, core stability and proprioception to improve patient's ability to develop conscious control of individual muscles and awareness of position of extremities in order to progress to PLOF and address remaining functional goals.  (see flow sheet as applicable)     Details if applicable:  balance interventions (tandem, SLS, march
characterized by decreased B step length, impaired heel strike B, reciprocal pattern, symmetrical stride length. Unsteadiness upon standing      Functional Squat: 30\"STS= 10 repetitions from standard chair with airex pad prop due to inability to achieve upright standing position without assist; Excessive BUE assist on thighs   Squat mechanics: hesitancy noted; 71 degree depth     Balance:   SLS RLE EO: 11 seconds; LLE EO: 12 seconds   Romberg EO: >30 seconds  Romberg EC: 30 seconds     Patient will continue to benefit from skilled PT / OT services to modify and progress therapeutic interventions, analyze and address functional mobility deficits, analyze and address ROM deficits, analyze and address strength deficits, analyze and address soft tissue restrictions, analyze and cue for proper movement patterns, analyze and modify for postural abnormalities, analyze and address imbalance/dizziness, and instruct in home and community integration to address functional deficits and attain remaining goals. Progress toward goals / Updated goals:  []  See Progress Note/Recertification  Short Term Goals: To be accomplished in 4 weeks  - Goal: Pt to be compliant with initial HEP to improve ability to independently address symptoms and functional deficits. Status at last note/certification: Established and reviewed with Pt  Current: intermittent compliance (9/08/2023)  - Goal: Pt to demo B cervical rotation AROM of at least 50 deg to improve ease with driving. Status at last note/certification: right 38 deg, left 34 deg  Current: progressing, right 45 deg, left 52 deg (9/08/2023) after stretching/AROM  - Goal: Pt to demo right shoulder flexion AROM of at least 140 deg to improve ease with grooming. Status at last note/certification: 786 deg  Current: addressing with exercises 9/5/2023  - Goal: Pt to demo B hip extension MMT of at least 4/5 to improve ease with transfers.   Status at last note/certification: 3/5 bilateral
Propranolol Pregnancy And Lactation Text: This medication is Pregnancy Category C and it isn't known if it is safe during pregnancy. It is excreted in breast milk.

## 2023-09-11 ENCOUNTER — HOSPITAL ENCOUNTER (OUTPATIENT)
Facility: HOSPITAL | Age: 83
Setting detail: RECURRING SERIES
Discharge: HOME | End: 2023-09-14
Payer: MEDICARE

## 2023-09-11 PROCEDURE — 97112 NEUROMUSCULAR REEDUCATION: CPT

## 2023-09-11 NOTE — PROGRESS NOTES
improved control. Continue POC as tolerated to improve balance and pain/symptoms. Patient will continue to benefit from skilled PT / OT services to modify and progress therapeutic interventions, analyze and address functional mobility deficits, analyze and address ROM deficits, analyze and address strength deficits, analyze and address soft tissue restrictions, analyze and cue for proper movement patterns, analyze and modify for postural abnormalities, analyze and address imbalance/dizziness, and instruct in home and community integration to address functional deficits and attain remaining goals. Progress toward goals / Updated goals:  []  See Progress Note/Recertification  Short Term Goals: To be accomplished in 4 weeks  - Goal: Pt to be compliant with initial HEP to improve ability to independently address symptoms and functional deficits. Status at last note/certification: Established and reviewed with Pt  Current: intermittent compliance (9/08/2023)  - Goal: Pt to demo B cervical rotation AROM of at least 50 deg to improve ease with driving. Status at last note/certification: right 38 deg, left 34 deg  Current: progressing, right 45 deg, left 52 deg (9/08/2023) after stretching/AROM  - Goal: Pt to demo right shoulder flexion AROM of at least 140 deg to improve ease with grooming. Status at last note/certification: 199 deg  Current: addressing with exercises 9/5/2023  - Goal: Pt to demo B hip extension MMT of at least 4/5 to improve ease with transfers. Status at last note/certification: 3/5 bilateral     Long Term Goals: To be accomplished in 8-12 weeks  - Goal: Pt to complete 4 Square Step Test in 20 seconds or less to demo improved stepping strategy. Status at last note/certification: 35 seconds  - Goal: Pt to report resolution of right UE symptoms to improve ease with cooking/cleaning tasks.   Status at last note/certification: right UE paresthesia present  Current: UE numbness this morning

## 2023-09-15 ENCOUNTER — HOSPITAL ENCOUNTER (OUTPATIENT)
Facility: HOSPITAL | Age: 83
Setting detail: RECURRING SERIES
Discharge: HOME | End: 2023-09-18
Payer: MEDICARE

## 2023-09-15 PROCEDURE — 97112 NEUROMUSCULAR REEDUCATION: CPT

## 2023-09-15 PROCEDURE — 97530 THERAPEUTIC ACTIVITIES: CPT

## 2023-09-15 PROCEDURE — 97110 THERAPEUTIC EXERCISES: CPT

## 2023-09-15 NOTE — PROGRESS NOTES
PHYSICAL / OCCUPATIONAL THERAPY - DAILY TREATMENT NOTE (updated )    Patient Name: Álvaro Dowling    Date: 9/15/2023    : 1940  Insurance: Payor: MEDICARE / Plan: MEDICARE PART A AND B / Product Type: *No Product type* /      Patient  verified yes     Visit #   Current / Total 2 10 Total Time   Time   In / Out 11:04 AM 12:12 PM 68   Pain   In / Out 0 0    Subjective Functional Status/Changes: \"Amazingly, I did not wake up with numbness tingling today. \"      TREATMENT AREA =  Right hip pain [M25.551]  Neck pain [M54.2]  Chronic pain of right upper extremity [M79.601, G89.29]    OBJECTIVE    Modalities Rationale:     decrease pain and increase tissue extensibility to improve patient's ability to progress to PLOF and address remaining functional goals. 10 min [] Ice Pack        [x]  Heat Pack  Location/Details: cervical; supported upright position          min [] Estim Unattended Location/Details:                                     []  w/ice    []  w/heat         min []  Mechanical Traction Location/Details:                  []  pro   []  sup   []  int   []  cont    []  before manual    []  after manual         min []  Vasopneumatic Device Location/Details:     If using vaso (only need to measure limb vaso being performed on)      pre-treatment girth :       post-treatment girth :       measured at (landmark location) :     Skin assessment post-treatment (if applicable):    [x]  Intact- no adverse reaction  []  redness- no adverse reaction                 []redness - adverse reaction:       Therapeutic Procedures: Tx Min  58   Billable or 1:1 Min (if diff from Tx Min)  53 Procedure, Rationale, Specifics   30 43 57297 Therapeutic Exercise (timed):  increase ROM, strength, coordination, balance, and proprioception to improve patient's ability to progress to PLOF and address remaining functional goals.  (see flow sheet as applicable)      Details if applicable:  step one, cervical AROM, bridges,

## 2023-09-18 ENCOUNTER — HOSPITAL ENCOUNTER (OUTPATIENT)
Facility: HOSPITAL | Age: 83
Setting detail: RECURRING SERIES
Discharge: HOME | End: 2023-09-21
Payer: MEDICARE

## 2023-09-18 PROCEDURE — 97112 NEUROMUSCULAR REEDUCATION: CPT

## 2023-09-18 PROCEDURE — 97110 THERAPEUTIC EXERCISES: CPT

## 2023-09-18 NOTE — PROGRESS NOTES
improve patient's ability to develop conscious control of individual muscles and awareness of position of extremities in order to progress to PLOF and address remaining functional goals. (see flow sheet as applicable)     Details if applicable: dynamic balance interventions (marches & tandem walking), static interventions on foam, chin tucks         [x]  Patient Education billed concurrently with other procedures   [x] Review HEP    [] Progressed/Changed HEP, detail:    [] Other detail:       Objective Information/Functional Measures/Assessment    Pt reports increased neck pain/stiffness which was addressed with self cervical stretches leading reduced symptoms. Challenged pt's balance with dynamic balance interventions such as walking marches and tandem walking (provided CGA-Irene). Pt demonstrated occasional loss of balance with the additional dynamic balance. Pt continues to fatigue quickly and require frequent rest breaks throughout session. Patient will continue to benefit from skilled PT / OT services to modify and progress therapeutic interventions, analyze and address functional mobility deficits, analyze and address ROM deficits, analyze and address strength deficits, analyze and address soft tissue restrictions, analyze and cue for proper movement patterns, analyze and modify for postural abnormalities, analyze and address imbalance/dizziness, and instruct in home and community integration to address functional deficits and attain remaining goals. Progress toward goals / Updated goals:  []  See Progress Note/Recertification    Short Term Goals: To be accomplished in 4 weeks  - Goal: Pt to be compliant with initial HEP to improve ability to independently address symptoms and functional deficits.   Status at last note/certification: Established and reviewed with Pt  Current: intermittent compliance (9/08/2023)  - Goal: Pt to demo B cervical rotation AROM of at least 50 deg to improve ease with

## 2023-09-20 ENCOUNTER — APPOINTMENT (OUTPATIENT)
Facility: HOSPITAL | Age: 83
End: 2023-09-20
Payer: MEDICARE

## 2023-09-21 ENCOUNTER — HOSPITAL ENCOUNTER (OUTPATIENT)
Facility: HOSPITAL | Age: 83
Setting detail: RECURRING SERIES
Discharge: HOME | End: 2023-09-24
Payer: MEDICARE

## 2023-09-21 PROCEDURE — 97112 NEUROMUSCULAR REEDUCATION: CPT

## 2023-09-21 PROCEDURE — 97110 THERAPEUTIC EXERCISES: CPT

## 2023-09-21 NOTE — PROGRESS NOTES
PHYSICAL / OCCUPATIONAL THERAPY - DAILY TREATMENT NOTE (updated )    Patient Name: Evaristo Litten    Date: 2023    : 1940  Insurance: Payor: MEDICARE / Plan: MEDICARE PART A AND B / Product Type: *No Product type* /      Patient  verified yes     Visit #   Current / Total 4 10 Total Time   Time   In / Out 12:59 pm 1:54 pm 55   Pain   In / Out 7/10 in neck 0/10    Subjective Functional Status/Changes: Patient notes attempting to walk more at home without her cane, but feels more comfortable using her cane. TREATMENT AREA =  Right hip pain [M25.551]  Neck pain [M54.2]  Chronic pain of right upper extremity [M79.601, G89.29]    OBJECTIVE    Modalities:  Modalities Rationale:     decrease pain and improve tissue extensibility to improve patient's ability to progress to PLOF and address remaining functional goals. -  5 min   []  Ice     [x]  Heat    location/position: Heat to right shoulder/neck, reclined position    Skin assessment post-treatment (if applicable):    [x]  intact    [x]  redness- no adverse reaction                 []redness - adverse reaction:           Therapeutic Procedures:  50    Total 40    Total  BC Totals Reminder: bill using total billable min of TIMED therapeutic procedures (example: do not include dry needle or estim unattended, both untimed codes, in totals to left)  8-22 min = 1 unit; 23-37 min = 2 units; 38-52 min = 3 units; 53-67 min = 4 units; 68-82 min = 5 units   Tx Min Billable or 1:1 Min (if diff from Tx Min) Procedure, Rationale, Specifics   25 64 [id number] Therapeutic Exercise (timed):  increase ROM, strength, coordination, balance, and proprioception to improve patient's ability to progress to PLOF and address remaining functional goals.  (see flow sheet as applicable)     Details if applicable:  stretching, strengthening     25 86 [id number] Neuromuscular Re-Education (timed):  improve balance, coordination, kinesthetic sense, posture, core stability

## 2023-09-25 ENCOUNTER — HOSPITAL ENCOUNTER (OUTPATIENT)
Facility: HOSPITAL | Age: 83
Setting detail: RECURRING SERIES
Discharge: HOME | End: 2023-09-28
Payer: MEDICARE

## 2023-09-25 PROCEDURE — 97110 THERAPEUTIC EXERCISES: CPT

## 2023-09-25 PROCEDURE — 97116 GAIT TRAINING THERAPY: CPT

## 2023-09-25 PROCEDURE — 97112 NEUROMUSCULAR REEDUCATION: CPT

## 2023-09-25 NOTE — PROGRESS NOTES
PHYSICAL / OCCUPATIONAL THERAPY - DAILY TREATMENT NOTE (updated )    Patient Name: Inder Cleveland    Date: 2023    : 1940  Insurance: Payor: MEDICARE / Plan: MEDICARE PART A AND B / Product Type: *No Product type* /      Patient  verified yes     Visit #   Current / Total 5 10 Total Time   Time   In / Out 2:23 3:05 42   Pain   In / Out 7 6    Subjective Functional Status/Changes: Pt tired today due to cleaning up the closet this morning. TREATMENT AREA =  Right hip pain [M25.551]  Neck pain [M54.2]  Chronic pain of right upper extremity [M79.601, G89.29]    OBJECTIVE  Therapeutic Procedures:  42  Total 39  Total [facility] Totals Reminder: bill using total billable min of TIMED therapeutic procedures (example: do not include dry needle or estim unattended, both untimed codes, in totals to left)  8-22 min = 1 unit; 23-37 min = 2 units; 38-52 min = 3 units; 53-67 min = 4 units; 68-82 min = 5 units   Tx Min Billable or 1:1 Min (if diff from Tx Min) Procedure, Rationale, Specifics   10 8 00859 Therapeutic Exercise (timed):  increase ROM, strength, coordination, balance, and proprioception to improve patient's ability to progress to PLOF and address remaining functional goals. (see flow sheet as applicable)   Details if applicable:       18 17 02988 Neuromuscular Re-Education (timed):  improve balance, coordination, kinesthetic sense, posture, core stability and proprioception to improve patient's ability to develop conscious control of individual muscles and awareness of position of extremities in order to progress to PLOF and address remaining functional goals. (see flow sheet as applicable)     Details if applicable: chin tucks, chin tucks with rotation, static balance interventions       14 14 73442 Gait Training (timed):    40 feet with over solid surfaces with SBA-CGA level of assist. Cuing for slowing down to increase SLS time.   To improve safety and dynamic movement with household/community

## 2023-09-27 ENCOUNTER — APPOINTMENT (OUTPATIENT)
Facility: HOSPITAL | Age: 83
End: 2023-09-27
Payer: MEDICARE

## 2023-09-28 ENCOUNTER — HOSPITAL ENCOUNTER (OUTPATIENT)
Facility: HOSPITAL | Age: 83
Setting detail: RECURRING SERIES
End: 2023-09-28
Payer: MEDICARE

## 2023-09-28 PROCEDURE — 97140 MANUAL THERAPY 1/> REGIONS: CPT

## 2023-09-28 PROCEDURE — 97110 THERAPEUTIC EXERCISES: CPT

## 2023-09-28 PROCEDURE — 97530 THERAPEUTIC ACTIVITIES: CPT

## 2023-09-28 NOTE — PROGRESS NOTES
PHYSICAL / OCCUPATIONAL THERAPY - DAILY TREATMENT NOTE (updated )    Patient Name: Charla Miller    Date: 2023    : 1940  Insurance: Payor: MEDICARE / Plan: MEDICARE PART A AND B / Product Type: *No Product type* /      Patient  verified yes     Visit #   Current / Total 6 10 Total Time   Time   In / Out 5:02 PM 6:04 PM 62   Pain   In / Out 6-7/10 3-4/10    Subjective Functional Status/Changes: \"My closet fell apart so I think that is the reason my neck and shoulder have been aching more. But I got a good  who has helped. \"      TREATMENT AREA =  Right hip pain [M25.551]  Neck pain [M54.2]  Chronic pain of right upper extremity [M79.601, G89.29]    OBJECTIVE    Modalities Rationale:     decrease inflammation, decrease pain, and increase tissue extensibility to improve patient's ability to progress to PLOF and address remaining functional goals. 10 min [] Ice Pack        [x]  Heat Pack  Location/Details: supported hooklying; cervical spine         min [] Estim Unattended Location/Details:                                     []  w/ice    []  w/heat         min []  Mechanical Traction Location/Details:                  []  pro   []  sup   []  int   []  cont    []  before manual    []  after manual         min []  Vasopneumatic Device Location/Details:     If using vaso (only need to measure limb vaso being performed on)      pre-treatment girth :       post-treatment girth :       measured at (landmark location) :     Skin assessment post-treatment (if applicable):    []  Intact- no adverse reaction  []  redness- no adverse reaction                 []redness - adverse reaction:       Therapeutic Procedures:     Total  52   Total  42 [facility] Totals Reminder: bill using total billable min of TIMED therapeutic procedures (example: do not include dry needle or estim unattended, both untimed codes, in totals to left)  8-22 min = 1 unit; 23-37 min = 2 units; 38-52 min = 3 units; 53-67 min = 4

## 2023-10-03 ENCOUNTER — HOSPITAL ENCOUNTER (OUTPATIENT)
Facility: HOSPITAL | Age: 83
Setting detail: RECURRING SERIES
Discharge: HOME | End: 2023-10-06
Payer: MEDICARE

## 2023-10-03 PROCEDURE — 97530 THERAPEUTIC ACTIVITIES: CPT

## 2023-10-03 PROCEDURE — 97112 NEUROMUSCULAR REEDUCATION: CPT

## 2023-10-03 NOTE — PROGRESS NOTES
[address] [facility] [facility]   [address] [facility] [address] [facility] [address] [place], [address]  Phone: [phone] Fax: [phone]  PROGRESS NOTE  Patient Name: George Anderson : 1940   Treatment/Medical Diagnosis: Right hip pain [M25.551]  Neck pain [M54.2]  Chronic pain of right upper extremity [M79.601, G89.29]   Referral Source: Neal Max Payor: Payor: MEDICARE / Plan: MEDICARE PART A AND B / Product Type: *No Product type* /    Date of Initial Visit: 8/10/23 Attended Visits: 15 Missed Visits: 0     SUMMARY OF TREATMENT  Patient is a 82year old female who has been treated in PT for 8 visits for complaints of neck pain, right hip pain, and unsteadiness on feet. Patient is making fair progress in therapy thus far. She subjectively reports decreased pain and improving range of motion. PT reassessment reveals improving transfer technique, increased cervical and UE AROM, and improving balance. Patient would continue to benefit from skilled PT to address gait mechanics, optimize static/dynamic balance to decrease fall risk, restore sit to stand transfer technique to improve functional mobility, and improve strength/ROM for ability to complete ADL's.     CURRENT STATUS  Patient has attended PT for 15 sessions for the treatment of neck pain, right hip pain, and impaired gait/balance. The patient demonstrates slow but steady progress at this time. She demonstrates improved shoulder range of motion and slight improvement in right shoulder strength since most recent PN. She demonstrates slow gains in cervical AROM but continues to note intermittent paresthesias into her right UE. She demonstrates vast improvements in static standing balance but dynamic gains have been more modest. Community ambulation remains limited by dyspnea and fatigue leading to dec stability.  Additional assessment includes:  Subjective Gains: improved balance, improved shoulder mobility, walking in the

## 2023-10-03 NOTE — PROGRESS NOTES
PHYSICAL / OCCUPATIONAL THERAPY - DAILY TREATMENT NOTE (updated )    Patient Name: Genie Leos    Date: 10/3/2023    : 1940  Insurance: Payor: MEDICARE / Plan: MEDICARE PART A AND B / Product Type: *No Product type* /      Patient  verified yes     Visit #   Current / Total 7 10 Total Time   Time   In / Out 2:23 3:22 59   Pain   In / Out 6 5-6    Subjective Functional Status/Changes: I feel like I am making good progress. TREATMENT AREA =  Right hip pain [M25.551]  Neck pain [M54.2]  Chronic pain of right upper extremity [M79.601, G89.29]    OBJECTIVE      Therapeutic Procedures:  61  Total 59  Total  BC Totals Reminder: bill using total billable min of TIMED therapeutic procedures (example: do not include dry needle or estim unattended, both untimed codes, in totals to left)  8-22 min = 1 unit; 23-37 min = 2 units; 38-52 min = 3 units; 53-67 min = 4 units; 68-82 min = 5 units   Tx Min Billable or 1:1 Min (if diff from Tx Min) Procedure, Rationale, Specifics   34 34 52828 Therapeutic Activity (timed):  use of dynamic activities replicating functional movements to increase ROM, strength, coordination, balance, and proprioception in order to improve patient's ability to progress to PLOF and address remaining functional goals. (see flow sheet as applicable)   Details if applicable:       25 25 08254 Neuromuscular Re-Education (timed):  improve balance, coordination, kinesthetic sense, posture, core stability and proprioception to improve patient's ability to develop conscious control of individual muscles and awareness of position of extremities in order to progress to PLOF and address remaining functional goals.  (see flow sheet as applicable)     Details if applicable:         [x]  Patient Education billed concurrently with other procedures   [x] Review HEP    [] Progressed/Changed HEP, detail:    [] Other detail:       Objective Information/Functional Measures/Assessment    Patient has

## 2023-10-05 ENCOUNTER — APPOINTMENT (OUTPATIENT)
Facility: HOSPITAL | Age: 83
End: 2023-10-05
Payer: MEDICARE

## 2023-10-06 ENCOUNTER — HOSPITAL ENCOUNTER (OUTPATIENT)
Facility: HOSPITAL | Age: 83
Setting detail: RECURRING SERIES
Discharge: HOME | End: 2023-10-09
Payer: MEDICARE

## 2023-10-06 PROCEDURE — 97110 THERAPEUTIC EXERCISES: CPT

## 2023-10-06 PROCEDURE — 97112 NEUROMUSCULAR REEDUCATION: CPT

## 2023-10-06 PROCEDURE — 97530 THERAPEUTIC ACTIVITIES: CPT

## 2023-10-06 NOTE — PROGRESS NOTES
PHYSICAL / OCCUPATIONAL THERAPY - DAILY TREATMENT NOTE (updated )    Patient Name: Radha Ernandez    Date: 10/6/2023    : 1940  Insurance: Payor: MEDICARE / Plan: MEDICARE PART A AND B / Product Type: *No Product type* /      Patient  verified yes     Visit #   Current / Total 1 10 Total Time   Time   In / Out 1:03 PM 2:06 PM  63   Pain   In / Out 7 2    Subjective Functional Status/Changes: \"I had the appointment with the doctor for my neck. He diagnosed me with a pinched nerve. My neck is hurting today but it helped when we did the massage\"     TREATMENT AREA =  Right hip pain [M25.551]  Neck pain [M54.2]  Chronic pain of right upper extremity [M79.601, G89.29]    OBJECTIVE    Modalities Rationale:     decrease pain and increase tissue extensibility to improve patient's ability to progress to PLOF and address remaining functional goals. 10 min [] Ice Pack        [x]  Heat Pack  Location/Details: cervical/right shoulder in supported sitting position          min [] Estim Unattended Location/Details:                                     []  w/ice    []  w/heat         min []  Mechanical Traction Location/Details:                  []  pro   []  sup   []  int   []  cont    []  before manual    []  after manual         min []  Vasopneumatic Device Location/Details:     If using vaso (only need to measure limb vaso being performed on)      pre-treatment girth :       post-treatment girth :       measured at (landmark location) :     Skin assessment post-treatment (if applicable):    []  Intact- no adverse reaction  []  redness- no adverse reaction                 []redness - adverse reaction:       Therapeutic Procedures:     Total  53   Total  45 [facility] Totals Reminder: bill using total billable min of TIMED therapeutic procedures (example: do not include dry needle or estim unattended, both untimed codes, in totals to left)  8-22 min = 1 unit; 23-37 min = 2 units; 38-52 min = 3 units; 53-67 min = 4

## 2023-10-10 ENCOUNTER — HOSPITAL ENCOUNTER (OUTPATIENT)
Facility: HOSPITAL | Age: 83
Setting detail: RECURRING SERIES
Discharge: HOME | End: 2023-10-13
Payer: MEDICARE

## 2023-10-10 PROCEDURE — 97535 SELF CARE MNGMENT TRAINING: CPT

## 2023-10-10 PROCEDURE — 97110 THERAPEUTIC EXERCISES: CPT

## 2023-10-10 PROCEDURE — 97140 MANUAL THERAPY 1/> REGIONS: CPT

## 2023-10-10 NOTE — PROGRESS NOTES
services to modify and progress therapeutic interventions, analyze and address functional mobility deficits, analyze and address ROM deficits, analyze and address strength deficits, analyze and address soft tissue restrictions, analyze and cue for proper movement patterns, analyze and modify for postural abnormalities, analyze and address imbalance, and instruct in home and community integration to address functional deficits and attain remaining goals. Progress toward goals / Updated goals:  []  See Progress Note/Recertification    Short Term Goals: To be accomplished in 4 weeks  - Goal: Pt to be compliant with initial HEP to improve ability to independently address symptoms and functional deficits. Status at last note/certification: Established and reviewed with Pt  Current: intermittent compliance (9/8/23)  - Goal: Pt to demo B cervical rotation AROM of at least 50 deg to improve ease with driving. Status at last note/certification: right 38 deg, left 34 deg  Current: progressing, right 45 deg, left 46 deg (10/3/23)  - Goal: Pt to demo right shoulder flexion AROM of at least 140 deg to improve ease with grooming. Status at last note/certification: 977 deg  Current: met, 142 deg (10/3/23)  - Goal: Pt to demo B hip extension MMT of at least 4/5 to improve ease with transfers. Status at last note/certification: 3/5 bilateral  Current: met, 4/5 B (10/3/23)     Long Term Goals: To be accomplished in 8-12 weeks  - Goal: Pt to complete 4 Square Step Test in 20 seconds or less to demo improved stepping strategy. Status at last note/certification: 35 seconds  Current: met, 15\" (10/3/23)  - Goal: Pt to report resolution of right UE symptoms to improve ease with cooking/cleaning tasks.   Status at last note/certification: right UE paresthesia present  Current: UE paresthesia centralized with cervical interventions (9/25/23)  - Goal: Pt will perform at least 10 sit<>stand repetitions in 30 seconds without UE assist to

## 2023-10-13 ENCOUNTER — HOSPITAL ENCOUNTER (OUTPATIENT)
Facility: HOSPITAL | Age: 83
Setting detail: RECURRING SERIES
Discharge: HOME | End: 2023-10-16
Payer: MEDICARE

## 2023-10-13 PROCEDURE — 97140 MANUAL THERAPY 1/> REGIONS: CPT

## 2023-10-13 PROCEDURE — 97530 THERAPEUTIC ACTIVITIES: CPT

## 2023-10-13 PROCEDURE — 97110 THERAPEUTIC EXERCISES: CPT

## 2023-10-13 NOTE — PROGRESS NOTES
PHYSICAL / OCCUPATIONAL THERAPY - DAILY TREATMENT NOTE (updated )    Patient Name: Evaristo Litten    Date: 10/13/2023    : 1940  Insurance: Payor: MEDICARE / Plan: MEDICARE PART A AND B / Product Type: *No Product type* /      Patient  verified yes     Visit #   Current / Total 3 10 Total Time   Time   In / Out 12:23 1:18 55   Pain   In / Out 7 2    Subjective Functional Status/Changes: \"My neck is not feeling great, but better\"     TREATMENT AREA =  Right hip pain [M25.551]  Neck pain [M54.2]  Chronic pain of right upper extremity [M79.601, G89.29]    OBJECTIVE    Modalities Rationale:     decrease pain and improve tissue extensibility to improve patient's ability to progress to PLOF and address remaining functional goals. -  10 min   []  Ice     [x]  Heat    location/position: cervical, Supine with wedge under LEs    Skin assessment post-treatment (if applicable):    [x]  intact    [x]  redness- no adverse reaction                 []redness - adverse reaction:           Therapeutic Procedures:  39  Total 40  Total [facility] Totals Reminder: bill using total billable min of TIMED therapeutic procedures (example: do not include dry needle or estim unattended, both untimed codes, in totals to left)  8-22 min = 1 unit; 23-37 min = 2 units; 38-52 min = 3 units; 53-67 min = 4 units; 68-82 min = 5 units   Tx Min Billable or 1:1 Min (if diff from Tx Min) Procedure, Rationale, Specifics   53 73 [id number] Therapeutic Exercise (timed):  increase ROM, strength, coordination, balance, and proprioception to improve patient's ability to progress to PLOF and address remaining functional goals.  (see flow sheet as applicable)   Details if applicable:       10 10 52357 Therapeutic Activity (timed):  use of dynamic activities replicating functional movements to increase ROM, strength, coordination, balance, and proprioception in order to improve patient's ability to progress to PLOF and address remaining

## 2023-10-17 ENCOUNTER — HOSPITAL ENCOUNTER (OUTPATIENT)
Facility: HOSPITAL | Age: 83
Setting detail: RECURRING SERIES
Discharge: HOME | End: 2023-10-20
Payer: MEDICARE

## 2023-10-17 PROCEDURE — 97530 THERAPEUTIC ACTIVITIES: CPT

## 2023-10-17 PROCEDURE — 97112 NEUROMUSCULAR REEDUCATION: CPT

## 2023-10-17 PROCEDURE — 97110 THERAPEUTIC EXERCISES: CPT

## 2023-10-17 NOTE — PROGRESS NOTES
flexion AROM of at least 140 deg to improve ease with grooming. Status at last note/certification: 017 deg  Current: met, 142 deg (10/3/23)  - Goal: Pt to demo B hip extension MMT of at least 4/5 to improve ease with transfers. Status at last note/certification: 3/5 bilateral  Current: met, 4/5 B (10/3/23)     Long Term Goals: To be accomplished in 8-12 weeks  - Goal: Pt to complete 4 Square Step Test in 20 seconds or less to demo improved stepping strategy. Status at last note/certification: 35 seconds  Current: met, 15\" (10/3/23)  - Goal: Pt to report resolution of right UE symptoms to improve ease with cooking/cleaning tasks. Status at last note/certification: right UE paresthesia present  Current: patient reported no UE paresthesia throughout interventions today (10/13/23)  - Goal: Pt will perform at least 10 sit<>stand repetitions in 30 seconds without UE assist to improve ease of transfers and demo improved functional LE strength. Status at last note/certification: progressing. 9 repetitions without UE assist from 21\" height (10/3/23)  Current: progressing. 9 repetitions without UE assist from chair + 1 airex (10/17/23)  - Goal: Patient will increase FOTO score to at least 56 pts to demonstrate increased functional mobility.   Status at last note/certification: FOTO 46 pts   Current: progressing, 55 pts (10/3/23)    PLAN  [x] Continue plan of care  [x]  Upgrade activities as tolerated  []  Discharge due to :  []  Other:    Emma Blanton, FUNMI    10/17/2023    10:57 AM  Sherry Moses, PT    Future Appointments   Date Time Provider [address]   10/17/2023  3:00 PM Sherry Moses, PT [facility]   10/20/2023  1:00 PM Sherry Moses, PT [facility]   10/24/2023  2:20 PM Sherry Moses, PT [facility]   10/31/2023  2:20 PM Sherry Moses, PT [facility]   11/3/2023  1:00 PM Sherry Mood, PT [facility]   11/7/2023  2:20 PM Sherry Mood, PT [facility]   11/10/2023  1:00 PM Sherry Mood, PT

## 2023-10-20 ENCOUNTER — HOSPITAL ENCOUNTER (OUTPATIENT)
Facility: HOSPITAL | Age: 83
Setting detail: RECURRING SERIES
Discharge: HOME | End: 2023-10-23
Payer: MEDICARE

## 2023-10-20 PROCEDURE — 97112 NEUROMUSCULAR REEDUCATION: CPT

## 2023-10-20 PROCEDURE — 97140 MANUAL THERAPY 1/> REGIONS: CPT

## 2023-10-20 NOTE — PROGRESS NOTES
PHYSICAL / OCCUPATIONAL THERAPY - DAILY TREATMENT NOTE (updated )    Patient Name: James Martinez    Date: 10/20/2023    : 1940  Insurance: Payor: MEDICARE / Plan: MEDICARE PART A AND B / Product Type: *No Product type* /      Patient  verified yes     Visit #   Current / Total 5 10 Total Time   Time   In / Out 1:02 1:49 47   Pain   In / Out 7 0    Subjective Functional Status/Changes: Pt reports increased neck pain with radicular symptoms down into right hand. Pt reports feeling unsteady today possibly due to new medication (gabapentin) she has been taken. TREATMENT AREA =  Right hip pain [M25.551]  Neck pain [M54.2]  Chronic pain of right upper extremity [M79.601, G89.29]    OBJECTIVE    Therapeutic Procedures:  47  Total 24  Total [facility] Totals Reminder: bill using total billable min of TIMED therapeutic procedures (example: do not include dry needle or estim unattended, both untimed codes, in totals to left)  8-22 min = 1 unit; 23-37 min = 2 units; 38-52 min = 3 units; 53-67 min = 4 units; 68-82 min = 5 units   Tx Min Billable or 1:1 Min (if diff from Tx Min) Procedure, Rationale, Specifics   17 [phone] [id number] Therapeutic Exercise (timed):  increase ROM, strength, coordination, balance, and proprioception to improve patient's ability to progress to PLOF and address remaining functional goals. (see flow sheet as applicable)   Details if applicable:        14 07525 Neuromuscular Re-Education (timed):  improve balance, coordination, kinesthetic sense, posture, core stability and proprioception to improve patient's ability to develop conscious control of individual muscles and awareness of position of extremities in order to progress to PLOF and address remaining functional goals.  (see flow sheet as applicable)     Details if applicable: scapular re-ed, balance, chin tucks       10 10 01547 Manual Therapy (timed):  decrease pain, increase ROM, increase tissue extensibility, decrease trigger points,

## 2023-10-24 ENCOUNTER — APPOINTMENT (OUTPATIENT)
Facility: HOSPITAL | Age: 83
End: 2023-10-24
Payer: MEDICARE

## 2023-10-31 ENCOUNTER — HOSPITAL ENCOUNTER (OUTPATIENT)
Facility: HOSPITAL | Age: 83
Setting detail: RECURRING SERIES
Discharge: HOME | End: 2023-11-03
Payer: MEDICARE

## 2023-10-31 PROCEDURE — 97110 THERAPEUTIC EXERCISES: CPT

## 2023-10-31 PROCEDURE — 97530 THERAPEUTIC ACTIVITIES: CPT

## 2023-10-31 PROCEDURE — 97112 NEUROMUSCULAR REEDUCATION: CPT

## 2023-10-31 NOTE — PROGRESS NOTES
Date Time Provider [address]   10/31/2023  2:20 PM Ash Hillman, PT [facility]   11/3/2023  1:00 PM Ash Hillman, PT [facility]   11/7/2023  2:20 PM Ash Hillman, STEFF [facility]   11/10/2023  1:00 PM Ash Hillman PT [facility]

## 2023-11-03 ENCOUNTER — HOSPITAL ENCOUNTER (OUTPATIENT)
Facility: HOSPITAL | Age: 83
Setting detail: RECURRING SERIES
Discharge: HOME | End: 2023-11-06
Payer: MEDICARE

## 2023-11-03 PROCEDURE — 97530 THERAPEUTIC ACTIVITIES: CPT

## 2023-11-03 PROCEDURE — 97110 THERAPEUTIC EXERCISES: CPT

## 2023-11-06 NOTE — PROGRESS NOTES
PHYSICAL / OCCUPATIONAL THERAPY - DAILY TREATMENT NOTE (updated )    Patient Name: George Anderson    Date: 11/3/2023    : 1940  Insurance: Payor: MEDICARE / Plan: MEDICARE PART A AND B / Product Type: *No Product type* /      Patient  verified yes     Visit #   Current / Total 7 10 Total Time   Time   In / Out 1:00 2:12 72   Pain   In / Out 5 5    Subjective Functional Status/Changes: Pt reports she had burning in her right upper arm when she would of this morning     TREATMENT AREA =  Right hip pain [M25.551]  Neck pain [M54.2]  Chronic pain of right upper extremity [M79.601, G89.29]    OBJECTIVE    Modalities Rationale:     decrease pain and improve tissue extensibility to improve patient's ability to progress to PLOF and address remaining functional goals. -  10 min   []  Ice     [x]  Heat    location/position: cervical, Reclined     Skin assessment post-treatment (if applicable):    [x]  intact    [x]  redness- no adverse reaction                 []redness - adverse reaction:           Therapeutic Procedures:  58  Total 42  Total [facility] Totals Reminder: bill using total billable min of TIMED therapeutic procedures (example: do not include dry needle or estim unattended, both untimed codes, in totals to left)  8-22 min = 1 unit; 23-37 min = 2 units; 38-52 min = 3 units; 53-67 min = 4 units; 68-82 min = 5 units   Tx Min Billable or 1:1 Min (if diff from Tx Min) Procedure, Rationale, Specifics   25 16 [id number] Therapeutic Exercise (timed):  increase ROM, strength, coordination, balance, and proprioception to improve patient's ability to progress to PLOF and address remaining functional goals.  (see flow sheet as applicable)   Details if applicable:       37 43 [id number] Therapeutic Activity (timed):  use of dynamic activities replicating functional movements to increase ROM, strength, coordination, balance, and proprioception in order to improve patient's ability to progress to PLOF and address
symptoms with positional traction to improve ease with cooking/cleaning tasks. Status at last note/certification: right UE paresthesia present  - Goal: Pt will perform at least 10 sit<>stand repetitions in 30 seconds without UE assist to improve ease of transfers and demo improved functional LE strength. Status at last note/certification: 5 reps without UE support and from regular height chair   - Goal: Patient will increase FOTO score to at least 56 pts to demonstrate increased functional mobility. Status at last note/certification: 55 pts    Frequency / Duration:   Patient to be seen   2   times per week for   8-12    weeks:    Assessments/Recommendations: Continue skilled therapy 2x/week 1) to address continued neck pain/right UE pain limiting ease of household chores and 2) to address LE/balance deficits impairing safety with standing household chores. If you have any questions/comments please contact us directly at [phone]. Thank you for allowing us to assist in the care of your patient. Certification Period: 11/4/23-2/2/24  Reporting Period (date from last assessment to current assessment): 10/4/23-11/3/23    Cristy Aguayo, PT       11/6/2023       10:40 AM      ___ I have read the above report and request that my patient continue as recommended.   ___ I have read the above report and request that my patient continue therapy with the following changes/special instructions: ________________________________________________   ___ I have read the above report and request that my patient be discharged from therapy. Physician's Signature:_________________________   DATE:_________   TIME:________                           Alonzo Pock    ** Signature, Date and Time must be completed for valid certification **  [facility] or you may fax the signed copy to [phone]  Thank you.

## 2023-11-07 ENCOUNTER — HOSPITAL ENCOUNTER (OUTPATIENT)
Facility: HOSPITAL | Age: 83
Setting detail: RECURRING SERIES
Discharge: HOME | End: 2023-11-10
Payer: MEDICARE

## 2023-11-07 PROCEDURE — 97112 NEUROMUSCULAR REEDUCATION: CPT

## 2023-11-07 PROCEDURE — 97530 THERAPEUTIC ACTIVITIES: CPT

## 2023-11-07 NOTE — PROGRESS NOTES
least 56 pts to demonstrate increased functional mobility.   Status at last note/certification: 55 pts    PLAN  [x] Continue plan of care  [x]  Upgrade activities as tolerated  []  Discharge due to :  []  Other:    Lui Coats, SPT    11/7/2023    2:08 PM  Sanjuanita Gaucher, PT    Future Appointments   Date Time Provider [id number]  46[facility]   11/7/2023  2:20 PM Sanjuanita Gaucher, PT [facility]   11/10/2023  1:00 PM Sanjuanita Gaucher, PT [facility] no

## 2023-11-10 ENCOUNTER — HOSPITAL ENCOUNTER (OUTPATIENT)
Facility: HOSPITAL | Age: 83
Setting detail: RECURRING SERIES
Discharge: HOME | End: 2023-11-13
Payer: MEDICARE

## 2023-11-10 PROCEDURE — 97116 GAIT TRAINING THERAPY: CPT

## 2023-11-10 PROCEDURE — 97110 THERAPEUTIC EXERCISES: CPT

## 2023-11-10 PROCEDURE — 97530 THERAPEUTIC ACTIVITIES: CPT

## 2023-11-10 NOTE — PROGRESS NOTES
PHYSICAL / OCCUPATIONAL THERAPY - DAILY TREATMENT NOTE (updated )    Patient Name: Jorge Luis Cheung    Date: 11/10/2023    : 1940  Insurance: Payor: MEDICARE / Plan: MEDICARE PART A AND B / Product Type: *No Product type* /      Patient  verified yes     Visit #   Current / Total 2 10 Total Time   Time   In / Out 1:00 1:54 54   Pain   In / Out 0 0    Subjective Functional Status/Changes: \"Im exhausted today, I am doing too much at home\"     TREATMENT AREA =  Right hip pain [M25.551]  Neck pain [M54.2]  Chronic pain of right upper extremity [M79.601, G89.29]    OBJECTIVE    Modalities Rationale:     decrease pain and improve tissue extensibility to improve patient's ability to progress to PLOF and address remaining functional goals. -  10 min   []  Ice     [x]  Heat    location/position: Right shoulder/cervical, Reclined with wedge under LEs    Skin assessment post-treatment (if applicable):    [x]  intact    [x]  redness- no adverse reaction                 []redness - adverse reaction:           Therapeutic Procedures:  40  Total 40  Total [facility] Totals Reminder: bill using total billable min of TIMED therapeutic procedures (example: do not include dry needle or estim unattended, both untimed codes, in totals to left)  8-22 min = 1 unit; 23-37 min = 2 units; 38-52 min = 3 units; 53-67 min = 4 units; 68-82 min = 5 units   Tx Min Billable or 1:1 Min (if diff from Tx Min) Procedure, Rationale, Specifics   15 13 34169 Therapeutic Exercise (timed):  increase ROM, strength, coordination, balance, and proprioception to improve patient's ability to progress to PLOF and address remaining functional goals.  (see flow sheet as applicable)   Details if applicable:       9 9 58114 Therapeutic Activity (timed):  use of dynamic activities replicating functional movements to increase ROM, strength, coordination, balance, and proprioception in order to improve patient's ability to progress to PLOF and

## 2023-11-13 ENCOUNTER — HOSPITAL ENCOUNTER (OUTPATIENT)
Facility: HOSPITAL | Age: 83
Setting detail: RECURRING SERIES
Discharge: HOME | End: 2023-11-16
Payer: MEDICARE

## 2023-11-13 PROCEDURE — 97110 THERAPEUTIC EXERCISES: CPT

## 2023-11-13 PROCEDURE — 97116 GAIT TRAINING THERAPY: CPT

## 2023-11-13 PROCEDURE — 97530 THERAPEUTIC ACTIVITIES: CPT

## 2023-11-13 NOTE — PROGRESS NOTES
PHYSICAL / OCCUPATIONAL THERAPY - DAILY TREATMENT NOTE (updated )    Patient Name: James Martinez    Date: 2023    : 1940  Insurance: Payor: MEDICARE / Plan: MEDICARE PART A AND B / Product Type: *No Product type* /      Patient  verified yes     Visit #   Current / Total 3 10 Total Time   Time   In / Out 10:20 AM 11:14 AM 54   Pain   In / Out 5/10  010    Subjective Functional Status/Changes: \"I have been keeping up with stretches at home. \"      TREATMENT AREA =  Right hip pain [M25.551]  Neck pain [M54.2]  Chronic pain of right upper extremity [M79.601, G89.29]    OBJECTIVE    Modalities Rationale:     decrease pain and improve tissue extensibility to improve patient's ability to progress to PLOF and address remaining functional goals. -  10 min   []  Ice     [x]  Heat    location/position: Right shoulder/cervical, supine with wedge under LEs    Skin assessment post-treatment (if applicable):    [x]  intact    [x]  redness- no adverse reaction                 []redness - adverse reaction:           Therapeutic Procedures: Total  44   Total  42 [facility] Totals Reminder: bill using total billable min of TIMED therapeutic procedures (example: do not include dry needle or estim unattended, both untimed codes, in totals to left)  8-22 min = 1 unit; 23-37 min = 2 units; 38-52 min = 3 units; 53-67 min = 4 units; 68-82 min = 5 units   Tx Min Billable or 1:1 Min (if diff from Tx Min) Procedure, Rationale, Specifics   10 8 93438 Therapeutic Exercise (timed):  increase ROM, strength, coordination, balance, and proprioception to improve patient's ability to progress to PLOF and address remaining functional goals.  (see flow sheet as applicable)     Details if applicable:  nu-step, stretches      18  37466 Therapeutic Activity (timed):  use of dynamic activities replicating functional movements to increase ROM, strength, coordination, balance, and proprioception in order to improve

## 2023-11-17 ENCOUNTER — HOSPITAL ENCOUNTER (OUTPATIENT)
Facility: HOSPITAL | Age: 83
Setting detail: RECURRING SERIES
Discharge: HOME | End: 2023-11-20
Payer: MEDICARE

## 2023-11-17 PROCEDURE — 97110 THERAPEUTIC EXERCISES: CPT

## 2023-11-17 PROCEDURE — 97116 GAIT TRAINING THERAPY: CPT

## 2023-11-17 PROCEDURE — 97530 THERAPEUTIC ACTIVITIES: CPT

## 2023-11-17 NOTE — PROGRESS NOTES
PHYSICAL / OCCUPATIONAL THERAPY - DAILY TREATMENT NOTE (updated )    Patient Name: Nicolas Sheets    Date: 2023    : 1940  Insurance: Payor: MEDICARE / Plan: MEDICARE PART A AND B / Product Type: *No Product type* /      Patient  verified yes     Visit #   Current / Total 4 10 Total Time   Time   In / Out 10:22 11:12 50   Pain   In / Out 6 0    Subjective Functional Status/Changes: \"My balance is not as good in the morning. \"     TREATMENT AREA =  Right hip pain [M25.551]  Neck pain [M54.2]  Chronic pain of right upper extremity [M79.601, G89.29]    OBJECTIVE    Modalities Rationale:     decrease pain and improve tissue extensibility to improve patient's ability to progress to PLOF and address remaining functional goals. -  10 min   []  Ice     [x]  Heat    location/position: Right shoulder/cervical, supine with wedge under LEs    Skin assessment post-treatment (if applicable):    [x]  intact    [x]  redness- no adverse reaction                 []redness - adverse reaction:           Therapeutic Procedures: Total  40   Total  40 [facility] Totals Reminder: bill using total billable min of TIMED therapeutic procedures (example: do not include dry needle or estim unattended, both untimed codes, in totals to left)  8-22 min = 1 unit; 23-37 min = 2 units; 38-52 min = 3 units; 53-67 min = 4 units; 68-82 min = 5 units   Tx Min Billable or 1:1 Min (if diff from Tx Min) Procedure, Rationale, Specifics   12  75551 Therapeutic Exercise (timed):  increase ROM, strength, coordination, balance, and proprioception to improve patient's ability to progress to PLOF and address remaining functional goals.  (see flow sheet as applicable)     Details if applicable:  LE strengthening      90662 Therapeutic Activity (timed):  use of dynamic activities replicating functional movements to increase ROM, strength, coordination, balance, and proprioception in order to improve patient's ability to progress

## 2023-11-22 ENCOUNTER — HOSPITAL ENCOUNTER (OUTPATIENT)
Facility: HOSPITAL | Age: 83
Setting detail: RECURRING SERIES
Discharge: HOME | End: 2023-11-25
Payer: MEDICARE

## 2023-11-22 PROCEDURE — 97110 THERAPEUTIC EXERCISES: CPT

## 2023-11-22 PROCEDURE — 97530 THERAPEUTIC ACTIVITIES: CPT

## 2023-11-22 PROCEDURE — 97116 GAIT TRAINING THERAPY: CPT

## 2023-11-22 NOTE — PROGRESS NOTES
PHYSICAL / OCCUPATIONAL THERAPY - DAILY TREATMENT NOTE (updated )    Patient Name: Barry Strange    Date: 2023    : 1940  Insurance: Payor: MEDICARE / Plan: MEDICARE PART A AND B / Product Type: *No Product type* /      Patient  verified yes     Visit #   Current / Total 5 10 Total Time   Time   In / Out 1:43 pm 2:37 pm 54   Pain   In / Out 6/10 in right hip  Stiffness in neck 0/10    Subjective Functional Status/Changes: \"I bought a mat that you can put on the floor and it stretches your body in ways that you don't normally stretch. \"     TREATMENT AREA =  Right hip pain [M25.551]  Neck pain [M54.2]  Chronic pain of right upper extremity [M79.601, G89.29]    OBJECTIVE    Modalities Rationale:     decrease pain and improve tissue extensibility to improve patient's ability to progress to PLOF and address remaining functional goals. -  10 min   []  Ice     [x]  Heat    location/position: Right shoulder/cervical, supine with wedge under LEs    Skin assessment post-treatment (if applicable):    [x]  intact    [x]  redness- no adverse reaction                 []redness - adverse reaction:           Therapeutic Procedures: Total  44   Total  39 [facility] Totals Reminder: bill using total billable min of TIMED therapeutic procedures (example: do not include dry needle or estim unattended, both untimed codes, in totals to left)  8-22 min = 1 unit; 23-37 min = 2 units; 38-52 min = 3 units; 53-67 min = 4 units; 68-82 min = 5 units   Tx Min Billable or 1:1 Min (if diff from Tx Min) Procedure, Rationale, Specifics   14 9 39700 Therapeutic Exercise (timed):  increase ROM, strength, coordination, balance, and proprioception to improve patient's ability to progress to PLOF and address remaining functional goals.  (see flow sheet as applicable)     Details if applicable:  LE strengthening      84781 Therapeutic Activity (timed):  use of dynamic activities replicating functional movements to

## 2023-11-28 ENCOUNTER — HOSPITAL ENCOUNTER (OUTPATIENT)
Facility: HOSPITAL | Age: 83
Setting detail: RECURRING SERIES
Discharge: HOME | End: 2023-12-01
Payer: MEDICARE

## 2023-11-28 PROCEDURE — 97530 THERAPEUTIC ACTIVITIES: CPT

## 2023-11-28 PROCEDURE — 97110 THERAPEUTIC EXERCISES: CPT

## 2023-11-28 PROCEDURE — 97112 NEUROMUSCULAR REEDUCATION: CPT

## 2023-11-28 NOTE — PROGRESS NOTES
PHYSICAL / OCCUPATIONAL THERAPY - DAILY TREATMENT NOTE (updated )    Patient Name: Erwin Brush    Date: 2023    : 1940  Insurance: Payor: MEDICARE / Plan: MEDICARE PART A AND B / Product Type: *No Product type* /      Patient  verified Yes     Visit #   Current / Total 6 10   Time   In / Out 11:02 11:55   Pain   In / Out 0 0   Subjective Functional Status/Changes: Pt reports compliance with HEP. Next PN/ RC due PN 2023, RC 2024  Auth due CHHAYA, [facility] ed, 2023    TREATMENT AREA =  Right hip pain [M25.551]  Neck pain [M54.2]  Chronic pain of right upper extremity [M79.601, G89.29]    OBJECTIVE    Modalities Rationale:     decrease pain to improve patient's ability to progress to PLOF and address remaining functional goals. min [] Estim Unattended, type/location:                                      []  w/ice    []  w/heat    min [] Estim Attended, type/location:                                     []  w/US     []  w/ice    []  w/heat    []  TENS insruct      min []  Mechanical Traction: type/lbs                   []  pro   []  sup   []  int   []  cont    []  before manual    []  after manual    min []  Ultrasound, settings/location:     10 min  unbill []  Ice     [x]  Heat    location/position: C/S, supine with wedge under B LE    min []  Paraffin,  details:     min []  Vasopneumatic Device, press/temp:     min []  Nae Crofts / Linda Alt: If using vaso (only need to measure limb vaso being performed on)      pre-treatment girth :       post-treatment girth :       measured at (landmark location) :      min []  Other:    Skin assessment post-treatment:   Intact      Therapeutic Procedures: Tx Min Billable or 1:1 Min (if diff from Tx Min) Procedure, Rationale, Specifics   15  59172 Therapeutic Exercise (timed):  increase ROM, strength, coordination, balance, and proprioception to improve patient's ability to progress to PLOF and address remaining functional goals.  (see

## 2023-12-01 ENCOUNTER — HOSPITAL ENCOUNTER (OUTPATIENT)
Facility: HOSPITAL | Age: 83
Setting detail: RECURRING SERIES
Discharge: HOME | End: 2023-12-04
Payer: MEDICARE

## 2023-12-01 PROCEDURE — 97530 THERAPEUTIC ACTIVITIES: CPT

## 2023-12-01 PROCEDURE — 97112 NEUROMUSCULAR REEDUCATION: CPT

## 2023-12-01 PROCEDURE — 97116 GAIT TRAINING THERAPY: CPT

## 2023-12-01 NOTE — PROGRESS NOTES
PHYSICAL / OCCUPATIONAL THERAPY - DAILY TREATMENT NOTE (updated )    Patient Name: Tirso Horton    Date: 2023    : 1940  Insurance: Payor: MEDICARE / Plan: MEDICARE PART A AND B / Product Type: *No Product type* /      Patient  verified Yes     Visit #   Current / Total 7 10   Time   In / Out 1:48 PM 2:47 PM   Pain   In / Out 7/10 2/10   Subjective Functional Status/Changes: \"My neck, shoulder and arm have been hurting the past few days. \"        Next PN/ RC due PN 2023, RC 2024  Auth due CHHAYA, [facility] ed, 2023    TREATMENT AREA =  Right hip pain [M25.551]  Neck pain [M54.2]  Chronic pain of right upper extremity [M79.601, G89.29]    OBJECTIVE    Modalities Rationale:     decrease pain to improve patient's ability to progress to PLOF and address remaining functional goals. min [] Estim Unattended, type/location:                                      []  w/ice    []  w/heat    min [] Estim Attended, type/location:                                     []  w/US     []  w/ice    []  w/heat    []  TENS insruct      min []  Mechanical Traction: type/lbs                   []  pro   []  sup   []  int   []  cont    []  before manual    []  after manual    min []  Ultrasound, settings/location:     10 min  unbill []  Ice     [x]  Heat    location/position: C/S, supine with wedge under B LE    min []  Paraffin,  details:     min []  Vasopneumatic Device, press/temp:     min []  Eldonna Flight / Reola Marion: If using vaso (only need to measure limb vaso being performed on)      pre-treatment girth :       post-treatment girth :       measured at (landmark location) :      min []  Other:    Skin assessment post-treatment:   Intact      Therapeutic Procedures:     Tx Min  49 Billable or 1:1 Min (if diff from Tx Min)  41 Procedure, Rationale, Specifics   8 NB 09396 Therapeutic Exercise (timed):  increase ROM, strength, coordination, balance, and proprioception to improve patient's ability to progress

## 2023-12-05 ENCOUNTER — HOSPITAL ENCOUNTER (OUTPATIENT)
Facility: HOSPITAL | Age: 83
Setting detail: RECURRING SERIES
Discharge: HOME | End: 2023-12-08
Payer: MEDICARE

## 2023-12-05 PROCEDURE — 97110 THERAPEUTIC EXERCISES: CPT

## 2023-12-05 PROCEDURE — 97112 NEUROMUSCULAR REEDUCATION: CPT

## 2023-12-05 PROCEDURE — 97530 THERAPEUTIC ACTIVITIES: CPT

## 2023-12-05 NOTE — PROGRESS NOTES
PHYSICAL  DAILY TREATMENT NOTE     Patient Name: Hanna Lanes    Date: 2023    : 1940  Insurance: Payor: MEDICARE / Plan: MEDICARE PART A AND B / Product Type: *No Product type* /      Patient  verified Yes     Visit #   Current / Total 8 10   Time   In / Out 225 320   Pain   In / Out 5/10 010   Subjective Functional Status/Changes: See PN         Next PN/ RC due 24  Auth due 23    TREATMENT AREA =  Right hip pain [M25.551]  Neck pain [M54.2]  Chronic pain of right upper extremity [M79.601, G89.29]    OBJECTIVE    Modalities Rationale:     decrease pain to improve patient's ability to progress to PLOF and address remaining functional goals. 10 min  unbill []  Ice     [x]  Heat    location/position: Fowlers with wedge under LEs to c/s                               Skin assessment post-treatment:   Intact      Therapeutic Procedures: Tx Min Billable or 1:1 Min (if diff from Tx Min) Procedure, Rationale, Specifics   15  94364 Therapeutic Exercise (timed):  increase ROM, strength, coordination, balance, and proprioception to improve patient's ability to progress to PLOF and address remaining functional goals. (see flow sheet as applicable)     Details if applicable:       10  37663 Neuromuscular Re-Education (timed):  improve balance, coordination, kinesthetic sense, posture, core stability and proprioception to improve patient's ability to develop conscious control of individual muscles and awareness of position of extremities in order to progress to PLOF and address remaining functional goals. (see flow sheet as applicable)     Details if applicable:     15  75054 Therapeutic Activity (timed):  use of dynamic activities replicating functional movements to increase ROM, strength, coordination, balance, and proprioception in order to improve patient's ability to progress to PLOF and address remaining functional goals.   (see flow sheet as applicable)     Details if applicable:
Pt to demo B cervical rotation AROM of at least 50 deg to improve ease with driving. MET  11/3/23: right 40 deg, left 45 deg  Status at last note/certification: B 55 degs  - Goal: Pt to complete TUG Test in 12 seconds or less without AD to demo improved stepping strategy. 11/3/23: 14''  Status at last note/certification: NT  - Goal: Pt to report ability to independently resolve right UE symptoms with positional traction to improve ease with cooking/cleaning tasks. progressing  Status at last note/certification:intermittent,  right UE paresthesia present  - Goal: Pt will perform at least 10 sit<>stand repetitions in 30 seconds without UE assist to improve ease of transfers and demo improved functional LE strength. MET  11/3/23:5 reps without UE support and from regular height chair   Status at last note/certification: 10 reps in 28 secs   - Goal: Patient will increase FOTO score to at least 56 pts to demonstrate increased functional mobility. NT  Status at last note/certification: 55 pts     Reporting Period: 11/7/23-12/5/23    Frequency / Duration:   Patient to be seen   2   times per week for   10    sessions:    RECOMMENDATIONS  Continue therapy per initial Plan of Care or most recent Medicare Recert. If you have any questions/comments please contact us directly. Thank you for allowing us to assist in the care of your patient. If you have any questions/comments please contact us directly at [phone]. Thank you for allowing us to assist in the care of your patient.     Reporting Period (date from last assessment to current assessment): 11/3/23-12/5/23     Franky Morgan PTA       12/5/2023       3:11 PM    Arianna Hart PT
No

## 2023-12-08 ENCOUNTER — HOSPITAL ENCOUNTER (OUTPATIENT)
Facility: HOSPITAL | Age: 83
Setting detail: RECURRING SERIES
Discharge: HOME | End: 2023-12-11
Payer: MEDICARE

## 2023-12-08 PROCEDURE — 97110 THERAPEUTIC EXERCISES: CPT

## 2023-12-08 PROCEDURE — 97530 THERAPEUTIC ACTIVITIES: CPT

## 2023-12-08 PROCEDURE — 97112 NEUROMUSCULAR REEDUCATION: CPT

## 2023-12-08 NOTE — PROGRESS NOTES
PHYSICAL  DAILY TREATMENT NOTE     Patient Name: Radha Ernandez    Date: 2023    : 1940  Insurance: Payor: MEDICARE / Plan: MEDICARE PART A AND B / Product Type: *No Product type* /      Patient  verified Yes     Visit #   Current / Total 1 10   Time   In / Out 1:46 pm 2:42 pm   Pain   In / Out 6/10 neck and right hip 5/10   Subjective Functional Status/Changes: \"The pain is always there. \"       Next PN/ RC due 24  Auth due 23    TREATMENT AREA =  Right hip pain [M25.551]  Neck pain [M54.2]  Chronic pain of right upper extremity [M79.601, G89.29]    OBJECTIVE    Modalities Rationale:     decrease pain to improve patient's ability to progress to PLOF and address remaining functional goals. 10 min  unbill []  Ice     [x]  Heat    location/position: Semi-reclined with wedge under LEs to (R) UT    Skin assessment post-treatment:   Intact      Therapeutic Procedures: Tx Min Billable or 1:1 Min (if diff from Tx Min) Procedure, Rationale, Specifics   15 9 09932 Therapeutic Exercise (timed):  increase ROM, strength, coordination, balance, and proprioception to improve patient's ability to progress to PLOF and address remaining functional goals. (see flow sheet as applicable)     Details if applicable:  strengthening     16 14 88906 Neuromuscular Re-Education (timed):  improve balance, coordination, kinesthetic sense, posture, core stability and proprioception to improve patient's ability to develop conscious control of individual muscles and awareness of position of extremities in order to progress to PLOF and address remaining functional goals.  (see flow sheet as applicable)     Details if applicable:  balance, weight shifting     15 15 85607 Therapeutic Activity (timed):  use of dynamic activities replicating functional movements to increase ROM, strength, coordination, balance, and proprioception in order to improve patient's ability to progress to PLOF and address remaining functional

## 2023-12-12 ENCOUNTER — HOSPITAL ENCOUNTER (OUTPATIENT)
Facility: HOSPITAL | Age: 83
Setting detail: RECURRING SERIES
Discharge: HOME | End: 2023-12-15
Payer: MEDICARE

## 2023-12-12 PROCEDURE — 97530 THERAPEUTIC ACTIVITIES: CPT

## 2023-12-12 PROCEDURE — 97110 THERAPEUTIC EXERCISES: CPT

## 2023-12-12 PROCEDURE — 97116 GAIT TRAINING THERAPY: CPT

## 2023-12-12 NOTE — PROGRESS NOTES
PLOF and address remaining functional goals. (see flow sheet as applicable)     Details if applicable:  functional movements, SLS, posture, patient education     44 40 [facility] Totals Reminder: bill using total billable min of TIMED therapeutic procedures (example: do not include dry needle or estim unattended, both untimed codes, in totals to left)  8-22 min = 1 unit; 23-37 min = 2 units; 38-52 min = 3 units; 53-67 min = 4 units; 68-82 min = 5 units   Total Total     [x]  Patient Education billed concurrently with other procedures   [x] Review HEP    [] Progressed/Changed HEP, detail:   [] Other detail:      Objective Information/Functional Measures/Assessment  Tolerated increased resistance on postural interventions  Continues to fatigue quickly with sit to stands requiring rest breaks between/after sets   Increased difficulty with lateral step downs (LLE stance)     Patient will continue to benefit from skilled PT / OT services to modify and progress therapeutic interventions, analyze and address functional mobility deficits, analyze and address ROM deficits, analyze and address strength deficits, analyze and address soft tissue restrictions, analyze and cue for proper movement patterns, analyze and modify for postural abnormalities, analyze and address imbalance/dizziness, and instruct in home and community integration to address functional deficits and attain remaining goals. Progress toward goals / Updated goals:  [x]  See Progress Note/Recertification  Goals for this certification period include and are to be achieved in   8-12  weeks:  - Goal: Pt to complete TUG Test in 12 seconds or less without AD to demo improved stepping strategy. Status at last note/certification: 68\"  - Goal: Pt to report ability to independently resolve right UE symptoms with positional traction to improve ease with cooking/cleaning tasks.    Status at last note/certification:intermittent,  right UE paresthesia present  Current:

## 2023-12-22 ENCOUNTER — HOSPITAL ENCOUNTER (OUTPATIENT)
Facility: HOSPITAL | Age: 83
Setting detail: RECURRING SERIES
Discharge: HOME | End: 2023-12-25
Payer: MEDICARE

## 2023-12-22 PROCEDURE — 97110 THERAPEUTIC EXERCISES: CPT

## 2023-12-22 PROCEDURE — 97112 NEUROMUSCULAR REEDUCATION: CPT

## 2023-12-22 PROCEDURE — 97530 THERAPEUTIC ACTIVITIES: CPT

## 2023-12-22 NOTE — PROGRESS NOTES
PHYSICAL  DAILY TREATMENT NOTE     Patient Name: Rafael Pedro    Date: 2023    : 1940  Insurance: Payor: MEDICARE / Plan: MEDICARE PART A AND B / Product Type: *No Product type* /      Patient  verified Yes     Visit #   Current / Total 5 10   Time   In / Out 1:03 PM 2:01 PM   Pain   In / Out 5/10 \"neck\" 2/10   Subjective Functional Status/Changes: \"I woke up on the right side of the bed today. \"     Next PN/ RC due 24  Auth due 23    TREATMENT AREA =  Right hip pain [M25.551]  Neck pain [M54.2]  Chronic pain of right upper extremity [M79.601, G89.29]    OBJECTIVE    Modalities Rationale:     decrease pain to improve patient's ability to progress to PLOF and address remaining functional goals. 10 min  unbill []  Ice     [x]  Heat    location/position: Semi-reclined with wedge under LEs to (R) shoulder    Skin assessment post-treatment:   Intact      Therapeutic Procedures: Tx Min  48 Billable or 1:1 Min (if diff from Tx Min)  44   Procedure, Rationale, Specifics   21 17 25895 Therapeutic Exercise (timed):  increase ROM, strength, coordination, balance, and proprioception to improve patient's ability to progress to PLOF and address remaining functional goals. (see flow sheet as applicable)     Details if applicable:  UBE, stretches, mobility, strengthening     18 18 60825 Therapeutic Activity (timed):  use of dynamic activities replicating functional movements to increase ROM, strength, coordination, balance, and proprioception in order to improve patient's ability to progress to PLOF and address remaining functional goals.   (see flow sheet as applicable)     Details if applicable: functional training     9 9 84860 Neuromuscular Re-Education (timed):  improve balance, coordination, kinesthetic sense, posture, core stability and proprioception to improve patient's ability to develop conscious control of individual muscles and awareness of position of extremities in order to progress

## 2023-12-26 ENCOUNTER — HOSPITAL ENCOUNTER (OUTPATIENT)
Facility: HOSPITAL | Age: 83
Setting detail: RECURRING SERIES
Discharge: HOME | End: 2023-12-29
Payer: MEDICARE

## 2023-12-26 PROCEDURE — 97110 THERAPEUTIC EXERCISES: CPT

## 2023-12-26 PROCEDURE — 97530 THERAPEUTIC ACTIVITIES: CPT

## 2023-12-26 NOTE — PROGRESS NOTES
PHYSICAL  DAILY TREATMENT NOTE     Patient Name: Kip Morrison    Date: 2023    : 1940  Insurance: Payor: MEDICARE / Plan: MEDICARE PART A AND B / Product Type: *No Product type* /      Patient  verified Yes     Visit #   Current / Total 6 10   Time   In / Out 2:20 PM 3:15pm   Pain   In / Out 5/10  6/10   Subjective Functional Status/Changes: \"Stressed today, fridge broke\"       Next PN/ RC due 24  Auth due 23    TREATMENT AREA =  Right hip pain [M25.551]  Neck pain [M54.2]  Chronic pain of right upper extremity [M79.601, G89.29]      Heat (UNBILLED):  location/position: cervical hot pack with pillowcase protection/layers  . Min Rationale   12 decrease pain and pt requesting neck pack   to improve patient's ability to progress to PLOF and address remaining functional goals. Skin assessment post-treatment:   Intact        Therapeutic Procedures: Tx Min  40 Billable or 1:1 Min (if diff from Tx Min)  40   Procedure, Rationale, Specifics   30 30 54948 Therapeutic Exercise (timed):  increase ROM, strength, coordination, balance, and proprioception to improve patient's ability to progress to PLOF and address remaining functional goals. (see flow sheet as applicable)     Details if applicable:  UBE, stretches, mobility, strengthening, dynamic balance and weight shifting to challenge balance, head turns and ducking for boxer squats. Poor tolerance to the Boxer squat challenge. 10 13 17761 Therapeutic Activity (timed):  use of dynamic activities replicating functional movements to increase ROM, strength, coordination, balance, and proprioception in order to improve patient's ability to progress to PLOF and address remaining functional goals. (see flow sheet as applicable)     Details if applicable: functional training and BP assessed during session.       54 40 [address] [facility] Reminder: bill using total billable min of TIMED therapeutic procedures (example: do not include dry

## 2023-12-29 ENCOUNTER — HOSPITAL ENCOUNTER (OUTPATIENT)
Facility: HOSPITAL | Age: 83
Setting detail: RECURRING SERIES
Discharge: HOME | End: 2024-01-01
Payer: MEDICARE

## 2023-12-29 PROCEDURE — 97530 THERAPEUTIC ACTIVITIES: CPT

## 2023-12-29 PROCEDURE — 97110 THERAPEUTIC EXERCISES: CPT

## 2023-12-29 NOTE — PROGRESS NOTES
PHYSICAL  DAILY TREATMENT NOTE     Patient Name: Stefania Guzman    Date: 2023    : 1940  Insurance: Payor: MEDICARE / Plan: MEDICARE PART A AND B / Product Type: *No Product type* /      Patient  verified Yes     Visit #   Current / Total 7 10   Time   In / Out 1:44 PM 2:40 PM   Pain   In / Out 8/10 \"neck\" 2/10   Subjective Functional Status/Changes: \"I go back to the neck doctor on the . I think I can take over things at home, yes. It all gets better if I do my exercises for my neck.\"        TREATMENT AREA =  Right hip pain [M25.551]  Neck pain [M54.2]  Chronic pain of right upper extremity [M79.601, G89.29]      Heat (UNBILLED):  location/position: cervical hot pack with pillowcase protection/layers  .    Min Rationale   10 decrease pain and pt requesting neck pack   to improve patient's ability to progress to PLOF and address remaining functional goals.     Skin assessment post-treatment:   Intact        Therapeutic Procedures:    Tx Min  46 Billable or 1:1 Min (if diff from Tx Min)    41 Procedure, Rationale, Specifics   23 18 69276 Therapeutic Exercise (timed):  increase ROM, strength, coordination, balance, and proprioception to improve patient's ability to progress to PLOF and address remaining functional goals. (see flow sheet as applicable)     Details if applicable:  mobility, strengthening, stretches, UBE      23 23 09377 Therapeutic Activity (timed):  use of dynamic activities replicating functional movements to increase ROM, strength, coordination, balance, and proprioception in order to improve patient's ability to progress to PLOF and address remaining functional goals.  (see flow sheet as applicable)     Details if applicable: reassessment for discharge, thorough education on HEP      46 41 [facility] Totals Reminder: bill using total billable min of TIMED therapeutic procedures (example: do not include dry needle or estim unattended, both untimed codes, in totals to left)  8-22 min

## 2023-12-29 NOTE — PROGRESS NOTES
[facility] [place] [id number], [place] [address]  Phone: [phone] Fax [phone]  DISCHARGE SUMMARY  Patient Name: Stefania Guzman : 1940   Treatment/Medical Diagnosis: Right hip pain [M25.551]  Neck pain [M54.2]  Chronic pain of right upper extremity [M79.601, G89.29]   Referral Source: Kohlerman, Nicholas, MD     Date of Initial Visit: 8/10/2023 Attended Visits: 37 Missed Visits: 1     SUMMARY OF TREATMENT  Patient is a 82 year old female who has been treated in PT for complaints of neck pain, right hip pain, and unsteadiness on feet.Treatment thus far has consisted of therapeutic exercises to address strength/ROM deficits, therapeutic activities for functional movement restoration, neuromuscular re-education for static/dynamic stabilization, gait training, patient education on self care strategies and HEP, manual therapy to address soft tissue restrictions, and modalities for symptom modulation.      CURRENT STATUS    Patient has progressed well since starting therapy. She demonstrates improved balance, no longer requiring cane to ambulate, and is independent with all transfers. She continues to report neck pain with radicular symptoms, however she responds well to stretches/ postural interventions which she has been given as part of HEP to maintain/facilitate gains.     Strength:  LE strength (measured in sitting): grossly 4+/5  UE strength (measured in sitting): grossly 4/5 (pain with R flexion and abduction indicative of continued impingement but patient given interventions in HEP to address)     Functional Reassessment:  5 Times Sit to Stand: 18 inch; 27 seconds without UE assist; 18 inches with UE assist 9 seconds   Gait: ambulating without AD   SLS LLE: 22 seconds  SLS RLE: >30 seconds   TU.5 seconds without AD (assessed last session)     Cervical AROM:     C/s AROM Current   Flexion 45   Extension 45   Right Rotation

## 2023-12-29 NOTE — PROGRESS NOTES
Physical Therapy Discharge Instructions      [facility]   [address] [address] [phone] fax      Patient: Stefania Guzman  : 1940      Continue Home Exercise Program per outlined in print out instructions (stretches daily, strengthening 3-4 times per week). Feel free to vary which exercises you do (I.e. do three one day and another three on the print out on the next day).       Continue with   [x] Heat as needed 1-3 times per day for 10-15 minutes                Follow up with MD:     [x] As needed      Recommendations:     [x]   Return to activity with home program        Additional Comments: If symptoms return or worsen and do not respond to the home exercises, feel free to contact MD for returning to PT.         Lyndsey Hernandez, PT 2023 1:56 PM

## 2024-01-02 ENCOUNTER — APPOINTMENT (OUTPATIENT)
Facility: HOSPITAL | Age: 84
End: 2024-01-02
Payer: MEDICARE

## 2024-01-05 ENCOUNTER — APPOINTMENT (OUTPATIENT)
Facility: HOSPITAL | Age: 84
End: 2024-01-05
Payer: MEDICARE

## 2024-02-08 ENCOUNTER — HOSPITAL ENCOUNTER (OUTPATIENT)
Facility: HOSPITAL | Age: 84
Setting detail: RECURRING SERIES
Discharge: HOME | End: 2024-02-11
Payer: MEDICARE

## 2024-02-08 PROCEDURE — 97110 THERAPEUTIC EXERCISES: CPT

## 2024-02-08 PROCEDURE — 97162 PT EVAL MOD COMPLEX 30 MIN: CPT

## 2024-02-08 NOTE — PROGRESS NOTES
PT CERVICAL EVAL AND TREATMENT     Patient Name: Stefania Guzman  Date:2024  : 1940  [x]  Patient  Verified  Payor: MEDICARE / Plan: MEDICARE PART A AND B / Product Type: *No Product type* /    In time:345  Out time:426  Total Treatment Time (min): 41  Total Timed Codes (min): 8  1:1 Treatment Time ( only): 31   Visit #: 1 of 8-10    Treatment Area: Neck pain [M54.2]  Other low back pain [M54.59]    SUBJECTIVE  Pain Level (0-10 scale): (C): 5 (B): 3 (W):  9  Any medication changes, allergies to medications, diagnosis change, or new procedure performed: see summary sheet for update    Chief Complaint:  Patient presents with co CS pain and R UE pain associated with \"pinched nerve\"  starting 2023 when she woke up to pain after sleeping in a different bed. Past treatments/ imaging have included PT at this facility at the end of  and patient report compliance with HEP but pain levels persists.  Pain levels range from 3/10 to 9/10 and managed with heating pad and stretches.  Current social situation include lives alone in one story but has local support . PMH significant for age related balance deficits  Reported functional deficits include: gripping with R UE, increased pain after heavy activities around the home, checking blind spot while driving. Positive objective assessment as follows:   Headaches: Do you have headaches? no  Posture: CS fwd head, TS rounded shoulders    Shoulder/Scapular Screen: WFL but pain with R UE with flexion     Active Movements:   ROM % AROM Comments:pain, area   Forward flexion 3 finger width     Extension Dec 25% pain   Rotation right 34    Rotation left 33      AROM - Thoracic Spine: dec 75% B     Palpation: R UT and scalene tenderness, R deltoid tenderness    Special Tests:  Cervical:        Spurling's:  [x] R    [] L    [x] +    [] -       Compression: [] R    [] L    [] +    [x] -  Muscle Flexibility: + grossly CS   Global Muscular Weakness:    Shoulder :

## 2024-02-08 NOTE — PROGRESS NOTES
[facility]  [address] [place] [address] Phone: [phone] Fax   Plan of Care / Statement of Necessity for Physical Therapy Services     Patient Name: Stefania Guzman : 1940   Medical   Diagnosis: *Neck pain [M54.2]  Other low back pain [M54.59] Treatment Diagnosis: M54.2  NECK PAIN    Onset Date: 2023 Payor:  Payor: MEDICARE / Plan: MEDICARE PART A AND B / Product Type: *No Product type* /    Referral Source: Dianne Arriaza PA Start of Care (SOC): 2024   Prior Hospitalization: See medical history Provider #: [id number]   Prior Level of Function: Functional I , lives alone    Comorbidities: See assessment section below      Assessment / key information:  Pt is a 83 y.o. year old female who presents with co CS pain and R UE pain associated with \"pinched nerve\"  starting 2023 when she woke up to pain after sleeping in a different bed. Past treatments/ imaging have included PT at this facility at the end of  and patient report compliance with HEP but pain levels persists.  Pain levels range from 3/10 to 9/10 and managed with heating pad and stretches.  Current social situation include lives alone in one story but has local support . PMH significant for age related balance deficits  Reported functional deficits include: gripping with R UE, increased pain after heavy activities around the home, checking blind spot while driving. Positive objective assessment as follows:   Headaches: Do you have headaches? no  Posture: CS fwd head, TS rounded shoulders    Shoulder/Scapular Screen: WFL but pain with R UE with flexion   ROM % AROM Comments:pain, area   Forward flexion 3 finger width     Extension Dec 25% pain   Rotation right 34    Rotation left 33      AROM - Thoracic Spine: dec 75% B   Palpation: R UT and scalene tenderness, R deltoid tenderness  Special Tests:  Cervical:        Spurling's:  [x] R    [] L    [x] +    []

## 2024-02-12 ENCOUNTER — HOSPITAL ENCOUNTER (OUTPATIENT)
Facility: HOSPITAL | Age: 84
Setting detail: RECURRING SERIES
Discharge: HOME | End: 2024-02-15
Payer: MEDICARE

## 2024-02-12 PROCEDURE — G0283 ELEC STIM OTHER THAN WOUND: HCPCS

## 2024-02-12 PROCEDURE — 97110 THERAPEUTIC EXERCISES: CPT

## 2024-02-12 PROCEDURE — 97112 NEUROMUSCULAR REEDUCATION: CPT

## 2024-02-12 NOTE — PROGRESS NOTES
PHYSICAL / OCCUPATIONAL THERAPY - DAILY TREATMENT NOTE    Patient Name: Stefania Guzman    Date: 2024    : 1940  Insurance: Payor: MEDICARE / Plan: MEDICARE PART A AND B / Product Type: *No Product type* /      Patient  verified Yes     Visit #   Current / Total 2 10   Time   In / Out 3:36 pm 4:30 pm   Pain   In / Out 9/10 4/10   Subjective Functional Status/Changes: Patient notes new onset of pain down the right arm.     TREATMENT AREA =  Neck pain [M54.2]  Other low back pain [M54.59]    OBJECTIVE  Modalities Rationale:     decrease edema, decrease inflammation, and decrease pain to improve patient's ability to progress to PLOF and address remaining functional goals.    10 min [x] Estim Unattended, type/location: IFC to C/S with patient semi-reclined and legs elevated on wedge                                    []  w/ice    [x]  w/heat    min [] Estim Attended, type/location:                                     []  w/US     []  w/ice    []  w/heat    []  TENS insruct      min []  Mechanical Traction: type/lbs                   []  pro   []  sup   []  int   []  cont    []  before manual    []  after manual    min []  Ultrasound, settings/location:      min  unbill []  Ice     []  Heat    location/position:     min []  Paraffin,  details:     min []  Vasopneumatic Device, press/temp:     min []  Whirlpool / Fluido:    If using vaso (only need to measure limb vaso being performed on)      pre-treatment girth :       post-treatment girth :       measured at (landmark location) :      min []  Other:    Skin assessment post-treatment:   Intact      Therapeutic Procedures:    Tx Min Billable or 1:1 Min (if diff from Tx Min) Procedure, Rationale, Specifics   30 24 10830 Therapeutic Exercise (timed):  increase ROM, strength, coordination, balance, and proprioception to improve patient's ability to progress to PLOF and address remaining functional goals. (see flow sheet as applicable)     Details if

## 2024-02-15 ENCOUNTER — HOSPITAL ENCOUNTER (OUTPATIENT)
Facility: HOSPITAL | Age: 84
Setting detail: RECURRING SERIES
Discharge: HOME | End: 2024-02-18
Payer: MEDICARE

## 2024-02-15 PROCEDURE — G0283 ELEC STIM OTHER THAN WOUND: HCPCS

## 2024-02-15 PROCEDURE — 97140 MANUAL THERAPY 1/> REGIONS: CPT

## 2024-02-15 PROCEDURE — 97110 THERAPEUTIC EXERCISES: CPT

## 2024-02-15 PROCEDURE — 97112 NEUROMUSCULAR REEDUCATION: CPT

## 2024-02-15 NOTE — PROGRESS NOTES
PHYSICAL / OCCUPATIONAL THERAPY - DAILY TREATMENT NOTE    Patient Name: Stefania Guzman    Date: 2/15/2024    : 1940  Insurance: Payor: MEDICARE / Plan: MEDICARE PART A AND B / Product Type: *No Product type* /      Patient  verified Yes     Visit #   Current / Total 3 10   Time   In / Out 1:43 2:41   Pain   In / Out 8 4   Subjective Functional Status/Changes: \"The R side of the neck is just hurting.  I want to grab just elbow my shoulder sometimes cause it just hurts there.   R UE symptoms: extend to the hand in the morning       TREATMENT AREA =  Neck pain [M54.2]  Other low back pain [M54.59]    OBJECTIVE  Modalities Rationale:     decrease edema, decrease inflammation, and decrease pain to improve patient's ability to progress to PLOF and address remaining functional goals.    10 min [x] Estim Unattended, type/location: IFC to C/S with patient supine and legs elevated on wedge                                    []  w/ice    [x]  w/heat    min [] Estim Attended, type/location:                                     []  w/US     []  w/ice    []  w/heat    []  TENS insruct      min []  Mechanical Traction: type/lbs                   []  pro   []  sup   []  int   []  cont    []  before manual    []  after manual    min []  Ultrasound, settings/location:      min  unbill []  Ice     []  Heat    location/position:     min []  Paraffin,  details:     min []  Vasopneumatic Device, press/temp:     min []  Whirlpool / Fluido:    If using vaso (only need to measure limb vaso being performed on)      pre-treatment girth :       post-treatment girth :       measured at (landmark location) :      min []  Other:    Skin assessment post-treatment:   Intact      Therapeutic Procedures:    Tx Min Billable or 1:1 Min (if diff from Tx Min) Procedure, Rationale, Specifics   20  85816 Therapeutic Exercise (timed):  increase ROM, strength, coordination, balance, and proprioception to improve patient's ability to progress to

## 2024-02-20 ENCOUNTER — APPOINTMENT (OUTPATIENT)
Facility: HOSPITAL | Age: 84
End: 2024-02-20
Payer: MEDICARE

## 2024-02-23 ENCOUNTER — HOSPITAL ENCOUNTER (OUTPATIENT)
Facility: HOSPITAL | Age: 84
Setting detail: RECURRING SERIES
Discharge: HOME | End: 2024-02-26
Payer: MEDICARE

## 2024-02-23 PROCEDURE — G0283 ELEC STIM OTHER THAN WOUND: HCPCS

## 2024-02-23 PROCEDURE — 97112 NEUROMUSCULAR REEDUCATION: CPT

## 2024-02-23 PROCEDURE — 97110 THERAPEUTIC EXERCISES: CPT

## 2024-02-23 PROCEDURE — 97140 MANUAL THERAPY 1/> REGIONS: CPT

## 2024-02-23 NOTE — PROGRESS NOTES
improve patient's ability to progress to PLOF and address remaining functional goals. (see flow sheet as applicable)     Details if applicable:  stretching, strengthening     9  81679 Neuromuscular Re-Education (timed):  improve balance, coordination, kinesthetic sense, posture, core stability and proprioception to improve patient's ability to develop conscious control of individual muscles and awareness of position of extremities in order to progress to PLOF and address remaining functional goals. (see flow sheet as applicable)     Details if applicable:  postural re-education     10   40243 Manual Therapy (timed):  decrease pain, increase ROM, increase tissue extensibility, and decrease trigger points to improve patient's ability to progress to PLOF and address remaining functional goals.  The manual therapy interventions were performed at a separate and distinct time from the therapeutic activities interventions . (see flow sheet as applicable)       Details if applicable:  Dtm to R > L UT, scalenes, LS; PROM CS rotation; R UT stretch; manual traction; R median n glide; SOR          Details if applicable:     39 39 [facility] Totals Reminder: bill using total billable min of TIMED therapeutic procedures (example: do not include dry needle or estim unattended, both untimed codes, in totals to left)  8-22 min = 1 unit; 23-37 min = 2 units; 38-52 min = 3 units; 53-67 min = 4 units; 68-82 min = 5 units   Total Total     [x]  Patient Education billed concurrently with other procedures   [x] Review HEP    [x] Progressed/Changed HEP, detail: median n glide on the R   [] Other detail:       Objective Information/Functional Measures/Assessment  Wall angels: unable without c/o (B) shoulder pain today  Able to complete in supine without c/o pain  AROM CS rotation   L 40 deg, R 40 with pulling noted (in supine)  PROm R rotation 45 deg  AA mobility (PROM): full  Sitting AROM after MT: L rotation 50, R 54 deg  + median n glide;

## 2024-02-26 ENCOUNTER — HOSPITAL ENCOUNTER (OUTPATIENT)
Facility: HOSPITAL | Age: 84
Setting detail: RECURRING SERIES
End: 2024-02-26
Payer: MEDICARE

## 2024-02-29 ENCOUNTER — HOSPITAL ENCOUNTER (OUTPATIENT)
Facility: HOSPITAL | Age: 84
Setting detail: RECURRING SERIES
End: 2024-02-29
Payer: MEDICARE

## 2024-03-04 ENCOUNTER — HOSPITAL ENCOUNTER (OUTPATIENT)
Facility: HOSPITAL | Age: 84
Setting detail: RECURRING SERIES
Discharge: HOME | End: 2024-03-07
Payer: MEDICARE

## 2024-03-04 PROCEDURE — 97110 THERAPEUTIC EXERCISES: CPT

## 2024-03-04 PROCEDURE — G0283 ELEC STIM OTHER THAN WOUND: HCPCS

## 2024-03-04 PROCEDURE — 97140 MANUAL THERAPY 1/> REGIONS: CPT

## 2024-03-04 PROCEDURE — 97112 NEUROMUSCULAR REEDUCATION: CPT

## 2024-03-04 NOTE — PROGRESS NOTES
[facility] [place]   [address] [place] [address]  Phone: [phone] Fax: [phone]  PROGRESS NOTE  Patient Name: Stefania Guzman : 1940   Treatment/Medical Diagnosis: Neck pain [M54.2]  Other low back pain [M54.59]   Referral Source: Dianne Arriaza PA Payor: Payor: MEDICARE / Plan: MEDICARE PART A AND B / Product Type: *No Product type* /    Date of Initial Visit: 24 Attended Visits: 5 Missed Visits: 3     SUMMARY OF TREATMENT  Stefania Guzman is a 83 y.o.  yo female with Dx of Neck pain [M54.2]  Other low back pain [M54.59].  Treatment has consisted of  therapeutic exercise, therapeutic activity, neuromuscular re-education. self care education and physical agent/modality to improve cervical pain.    CURRENT STATUS  Patient has attended 5 of 8 scheduled sessions from 24-3/6/24 with fair progress toward goals. Patietn reports improvemetn in pain with daiily activities and driving.  right sided cervical symptoms    Subjective:  Reported Improvement: 25%  Pain: Average 7/10   Best 0/10   Worst 7/10  Reported Functional Deficits: turning to right, pain with ADLs  Reported Functional Improvements: cervical rotation and overall pain with ADLs    Objective:  Posture: CS fwd head, TS rounded shoulders     Active Movements:   Cervical ROM AROM   Forward flexion 40 degrees   Extension 40 degrees   Rotation right 35 degrees   Rotation left 40 degrees     Global Muscular Weakness:   Right Shoulder: Flexion 4-/5, ABD 4-/5, ER 4-/5, IR 4/5   : right 45 psi, left 40 psi    GOAL ASSESSMENT   Short Term Goals: To be accomplished in  4  weeks:  1.  Pt will be independent and compliant with HEP  Status at last assessment :HEP est at initial eval and will be progressed as needed.   Current: met; patient reports good daily compliance (3/4/24)  2.  Patient will increase FOTO score to 57/100 for indications of increased functional mobility. 
Cruz Wilkins, PT    3/4/2024    1:43 PM    Future Appointments   Date Time Provider Department Center   3/7/2024  1:40 PM Deion Graves, BABATUNDE [facility]   3/11/2024  1:40 PM Deion Graves, BABATUNDE [facility]   3/14/2024  1:40 PM Andria Ragsdale, PT [facility]   3/19/2024  1:00 PM Andria Ragsdale, PT [facility]   3/21/2024  2:20 PM Andria Ragsdale, PT [facility]

## 2024-03-07 ENCOUNTER — HOSPITAL ENCOUNTER (OUTPATIENT)
Facility: HOSPITAL | Age: 84
Setting detail: RECURRING SERIES
Discharge: HOME | End: 2024-03-10
Payer: MEDICARE

## 2024-03-07 PROCEDURE — 97112 NEUROMUSCULAR REEDUCATION: CPT

## 2024-03-07 PROCEDURE — 97140 MANUAL THERAPY 1/> REGIONS: CPT

## 2024-03-07 PROCEDURE — 97110 THERAPEUTIC EXERCISES: CPT

## 2024-03-07 PROCEDURE — G0283 ELEC STIM OTHER THAN WOUND: HCPCS

## 2024-03-07 NOTE — PROGRESS NOTES
strength deficits, analyze and address soft tissue restrictions, analyze and cue for proper movement patterns, and analyze and modify for postural abnormalities to address functional deficits and attain remaining goals.    Progress toward goals / Updated goals:  []  See Progress Note/Recertification  1.  Patient will increase FOTO score to 57/100 for indications of increased functional mobility.   Status at PN: 53 points  2.  Patient will demo CS rotation to 45 deg for ease with checking her blind spot   Status at PN: rotation right 35 deg, left 40 deg    Long Term Goals: Long Term Goals: To be accomplished in  8-12  weeks from initial evaluation    1.  Patient will increase FOTO score to 57/100 for indications of increased functional mobility.   Status at last assessment:   2.  Patient will demo negative Spurlings for improved CS mobility to decrease UE pain  Status at last assessment:   3.  Patient will demo R shoulder ER strength to 4/5 for ease with GH stability to decrease UT compensation   Status at last assessment:   Current: goal progressing; initiate   4.  Patient will be IND with DC HEP for progression to self management    Status at IE: progressive HEP throughout treatment     PN due 4/3/24  Recert due 5/6/24  Auth not required    PLAN  - Continue Plan of Care    Deion Graves PTA    3/7/2024    2:31 PM    Future Appointments   Date Time Provider Department Center   3/11/2024  1:40 PM Deion Graves PTA [facility]   3/14/2024  1:40 PM Andria Ragsdale PT [facility]   3/19/2024  1:00 PM Andria Ragsdale PT [facility]   3/21/2024  2:20 PM Andria Ragsdale PT [facility]

## 2024-03-11 ENCOUNTER — HOSPITAL ENCOUNTER (OUTPATIENT)
Facility: HOSPITAL | Age: 84
Setting detail: RECURRING SERIES
Discharge: HOME | End: 2024-03-14
Payer: MEDICARE

## 2024-03-11 PROCEDURE — 97140 MANUAL THERAPY 1/> REGIONS: CPT

## 2024-03-11 PROCEDURE — 97112 NEUROMUSCULAR REEDUCATION: CPT

## 2024-03-11 PROCEDURE — 97110 THERAPEUTIC EXERCISES: CPT

## 2024-03-11 PROCEDURE — G0283 ELEC STIM OTHER THAN WOUND: HCPCS

## 2024-03-11 NOTE — PROGRESS NOTES
analyze and address strength deficits, analyze and address soft tissue restrictions, analyze and cue for proper movement patterns, and analyze and modify for postural abnormalities to address functional deficits and attain remaining goals.    Progress toward goals / Updated goals:  []  See Progress Note/Recertification  1.  Patient will increase FOTO score to 57/100 for indications of increased functional mobility.   Status at PN: 53 points  2.  Patient will demo CS rotation to 45 deg for ease with checking her blind spot   Status at PN: rotation right 35 deg, left 40 deg    Long Term Goals: Long Term Goals: To be accomplished in  8-12  weeks from initial evaluation    1.  Patient will increase FOTO score to 57/100 for indications of increased functional mobility.   Status at last assessment:   2.  Patient will demo negative Spurlings for improved CS mobility to decrease UE pain  Status at last assessment:   3.  Patient will demo R shoulder ER strength to 4/5 for ease with GH stability to decrease UT compensation   Status at last assessment:   Current: goal progressing; initiated resisted ER to progress to goal with green resisted band (3/11/24)  4.  Patient will be IND with DC HEP for progression to self management    Status at IE: progressive HEP throughout treatment     PN due 4/3/24  Recert due 5/6/24  Auth not required    PLAN  - Continue Plan of Care    Deion Graves, PTA    3/11/2024    2:26 PM    Future Appointments   Date Time Provider Department Center   3/14/2024  1:40 PM Andria Ragsdale PT [facility]   3/19/2024  1:00 PM Andria Ragsdale PT [facility]   3/21/2024  2:20 PM Andria Ragsdale PT [facility]

## 2024-03-14 ENCOUNTER — HOSPITAL ENCOUNTER (OUTPATIENT)
Facility: HOSPITAL | Age: 84
Setting detail: RECURRING SERIES
Discharge: HOME | End: 2024-03-17
Payer: MEDICARE

## 2024-03-14 PROCEDURE — 97140 MANUAL THERAPY 1/> REGIONS: CPT

## 2024-03-14 PROCEDURE — 97112 NEUROMUSCULAR REEDUCATION: CPT

## 2024-03-14 PROCEDURE — G0283 ELEC STIM OTHER THAN WOUND: HCPCS

## 2024-03-14 PROCEDURE — 97110 THERAPEUTIC EXERCISES: CPT

## 2024-03-14 NOTE — PROGRESS NOTES
applicable)     Details if applicable:  stretching, strengthening     17 17 [id number] Neuromuscular Re-Education (timed):  improve balance, coordination, kinesthetic sense, posture, core stability and proprioception to improve patient's ability to develop conscious control of individual muscles and awareness of position of extremities in order to progress to PLOF and address remaining functional goals. (see flow sheet as applicable)     Details if applicable:  postural re-education, GHJ/scapular dissociation     12 12  04644 Manual Therapy (timed):  decrease pain, increase ROM, increase tissue extensibility, and decrease trigger points to improve patient's ability to progress to PLOF and address remaining functional goals.  The manual therapy interventions were performed at a separate and distinct time from the therapeutic activities interventions . (see flow sheet as applicable)       Details if applicable:  SOR, DTM cervical paraspinals, UT; (B) UT and LS stretch; PROM (B) CS rotation and SB; PA glides to upper TS       49 49 MC BC Totals Reminder: bill using total billable min of TIMED therapeutic procedures (example: do not include dry needle or estim unattended, both untimed codes, in totals to left)  8-22 min = 1 unit; 23-37 min = 2 units; 38-52 min = 3 units; 53-67 min = 4 units; 68-82 min = 5 units   Total Total     [x]  Patient Education billed concurrently with other procedures   [x] Review HEP    [] Progressed/Changed HEP, detail:   [] Other detail:       Objective Information/Functional Measures/Assessment:    CS AROM Rotation: in supine R 55 deg , L 62 deg   Pt with minimal TrP and TTP in the (B)  UT. Decreased c/o pain and increased c/o CS ROM. Pt challenged by supine pec openers and TS mobility. Demo's increased ease of mobility, less muscle guarding and overall improved postural awareness.     Patient will continue to benefit from skilled PT / OT services to modify and progress therapeutic interventions,

## 2024-03-19 ENCOUNTER — HOSPITAL ENCOUNTER (OUTPATIENT)
Facility: HOSPITAL | Age: 84
Setting detail: RECURRING SERIES
Discharge: HOME | End: 2024-03-22
Payer: MEDICARE

## 2024-03-19 PROCEDURE — G0283 ELEC STIM OTHER THAN WOUND: HCPCS

## 2024-03-19 PROCEDURE — 97140 MANUAL THERAPY 1/> REGIONS: CPT

## 2024-03-19 PROCEDURE — 97110 THERAPEUTIC EXERCISES: CPT

## 2024-03-19 NOTE — PROGRESS NOTES
address strength deficits, analyze and address soft tissue restrictions, analyze and cue for proper movement patterns, and analyze and modify for postural abnormalities to address functional deficits and attain remaining goals.    Progress toward goals / Updated goals:  []  See Progress Note/Recertification  1.  Patient will increase FOTO score to 57/100 for indications of increased functional mobility.   Status at PN: 53 points  2.  Patient will demo CS rotation to 45 deg for ease with checking her blind spot   Status at PN: rotation right 35 deg, left 40 deg  Current: progressing in supine R 33 deg, L 62 (3/14/24)    Long Term Goals: Long Term Goals: To be accomplished in  8-12  weeks from initial evaluation    1.  Patient will increase FOTO score to 57/100 for indications of increased functional mobility.   Status at last assessment:  53/100  2.  Patient will demo negative Spurlings for improved CS mobility to decrease UE pain  Status at last assessment: + spurlings  3.  Patient will demo R shoulder ER strength to 4/5 for ease with GH stability to decrease UT compensation   Status at last assessment: 4-/5 MMT  Current: goal progressing; initiated resisted ER to progress to goal with green resisted band (3/11/24)  4.  Patient will be IND with DC HEP for progression to self management    Status at IE: progressive HEP throughout treatment   Current: review HEP today for management of pain (3/19/24)    PN due 4/3/24  Recert due 5/6/24  Auth not required    PLAN  - Continue Plan of Care  Pt to schedule further appts through 4/3/24 and then reassessment   Resume normal program [place] including JON Ragsdale PT    3/19/2024    8:35 AM    Future Appointments   Date Time Provider Department Center   3/19/2024  1:00 PM Andria Ragsdale, PT [facility]   3/21/2024  2:20 PM Andria Ragsdale PT [facility]

## 2024-03-21 ENCOUNTER — HOSPITAL ENCOUNTER (OUTPATIENT)
Facility: HOSPITAL | Age: 84
Setting detail: RECURRING SERIES
Discharge: HOME | End: 2024-03-24
Payer: MEDICARE

## 2024-03-21 PROCEDURE — 97110 THERAPEUTIC EXERCISES: CPT

## 2024-03-21 PROCEDURE — 97140 MANUAL THERAPY 1/> REGIONS: CPT

## 2024-03-21 PROCEDURE — G0283 ELEC STIM OTHER THAN WOUND: HCPCS

## 2024-03-28 ENCOUNTER — HOSPITAL ENCOUNTER (OUTPATIENT)
Facility: HOSPITAL | Age: 84
Setting detail: RECURRING SERIES
Discharge: HOME | End: 2024-03-31
Payer: MEDICARE

## 2024-03-28 PROCEDURE — 97110 THERAPEUTIC EXERCISES: CPT

## 2024-03-28 PROCEDURE — G0283 ELEC STIM OTHER THAN WOUND: HCPCS

## 2024-03-28 PROCEDURE — 97112 NEUROMUSCULAR REEDUCATION: CPT

## 2024-03-28 PROCEDURE — 97140 MANUAL THERAPY 1/> REGIONS: CPT

## 2024-03-28 NOTE — PROGRESS NOTES
hyperkyphotic      Active Movements:   Cervical ROM AROM (sitting) AROM (supine)   Forward flexion 40 degrees    Extension  60 degrees    LSB 22 deg    RSB 28 deg     Rotation right 54 degrees 54   Rotation left 50 degrees 54      Global Muscular Weakness:   Right Shoulder: Flexion 4/5, ABD 4-/5, ER 4/5, IR 5/5   : right 55, 45 psi, left 40, 37 psi (elbow at 90 deg flexion; filipe position 2)      Patient will continue to benefit from skilled PT / OT services to modify and progress therapeutic interventions, analyze and address functional mobility deficits, analyze and address ROM deficits, analyze and address strength deficits, analyze and address soft tissue restrictions, analyze and cue for proper movement patterns, and analyze and modify for postural abnormalities to address functional deficits and attain remaining goals.    Progress toward goals / Updated goals:  []  See Progress Note/Recertification  1.  Patient will increase FOTO score to 57/100 for indications of increased functional mobility.   Status at PN: 53 points  2.  Patient will demo CS rotation to 45 deg for ease with checking her blind spot   Status at PN: rotation right 35 deg, left 40 deg  Current: progressing in supine R 52 deg with min pain, L 70 deg at best; 54 today (3/28/24)    Long Term Goals: Long Term Goals: To be accomplished in  8-12  weeks from initial evaluation    1.  Patient will increase FOTO score to 57/100 for indications of increased functional mobility.   Status at last assessment:  53/100  2.  Patient will demo negative Spurlings for improved CS mobility to decrease UE pain  Status at last assessment: + spurlings  3.  Patient will demo R shoulder ER strength to 4/5 for ease with GH stability to decrease UT compensation   Status at last assessment: 4-/5 MMT  Current: goal met with R MMT 4/5  (3/28/24)  4.  Patient will be IND with DC HEP for progression to self management    Status at IE: progressive HEP throughout treatment

## 2024-04-02 ENCOUNTER — HOSPITAL ENCOUNTER (OUTPATIENT)
Facility: HOSPITAL | Age: 84
Setting detail: RECURRING SERIES
Discharge: HOME | End: 2024-04-05
Payer: MEDICARE

## 2024-04-02 PROCEDURE — 97110 THERAPEUTIC EXERCISES: CPT

## 2024-04-02 PROCEDURE — 97140 MANUAL THERAPY 1/> REGIONS: CPT

## 2024-04-02 PROCEDURE — 97112 NEUROMUSCULAR REEDUCATION: CPT

## 2024-04-02 PROCEDURE — G0283 ELEC STIM OTHER THAN WOUND: HCPCS

## 2024-04-02 NOTE — PROGRESS NOTES
PHYSICAL / OCCUPATIONAL THERAPY - DAILY TREATMENT NOTE    Patient Name: Stefania Guzman    Date: 2024    : 1940  Insurance: Payor: MEDICARE / Plan: MEDICARE PART A AND B / Product Type: *No Product type* /      Patient  verified Yes     Visit #   Current / Total 7 10   Time   In / Out 3:00 3:52   Pain   In / Out 7 2   Subjective Functional Status/Changes: \"Not sure why things are flared up. Haven't done much.  I have R neck pain and R arm symptoms\"      TREATMENT AREA =  Neck pain [M54.2]  Other low back pain [M54.59]    OBJECTIVE  Modalities Rationale:     decrease edema, decrease inflammation, and decrease pain to improve patient's ability to progress to PLOF and address remaining functional goals.    10 min  [x] Estim Unattended, type/location: premod to C/S with patient supine and legs elevated on wedge                                    []  w/ice    [x]  w/heat    min [] Estim Attended, type/location:                                     []  w/US     []  w/ice    []  w/heat    []  TENS insruct      min []  Mechanical Traction: type/lbs                   []  pro   []  sup   []  int   []  cont    []  before manual    []  after manual    min []  Ultrasound, settings/location:      min  unbill []  Ice     []  Heat    location/position:     min []  Paraffin,  details:     min []  Vasopneumatic Device, press/temp:     min []  Whirlpool / Fluido:    If using vaso (only need to measure limb vaso being performed on)      pre-treatment girth :       post-treatment girth :       measured at (landmark location) :      min []  Other:    Skin assessment post-treatment:   Intact      Therapeutic Procedures:    Tx Min Billable or 1:1 Min (if diff from Tx Min) Procedure, Rationale, Specifics   17  41342 Therapeutic Exercise (timed):  increase ROM, strength, coordination, balance, and proprioception to improve patient's ability to progress to PLOF and address remaining functional goals. (see flow sheet as

## 2024-04-02 NOTE — PROGRESS NOTES
[facility] [place]   [address] [place] [address]  Phone: [phone] Fax: [phone]  PROGRESS NOTE  Patient Name: Stefania Guzman : 1940   Treatment/Medical Diagnosis: Neck pain [M54.2]  Other low back pain [M54.59]   Referral Source: Dianne Arriaza PA Payor: Payor: MEDICARE / Plan: MEDICARE PART A AND B / Product Type: *No Product type* /    Date of Initial Visit: 24 Attended Visits: 12 Missed Visits: 3     SUMMARY OF TREATMENT  Stefania Guzman is a 83 y.o.  yo female with Dx of Neck pain [M54.2]  Other low back pain [M54.59].  Treatment has consisted of  therapeutic exercise, therapeutic activity, neuromuscular re-education, MT, estim and moist heat; self care education and physical agent/modality to improve cervical pain.    CURRENT STATUS  Patient has attended 12 sessions from 24-24 with improved but fluctuating progress towards goals. She largely has a reduction in pain, increased ROM and increased function. Posture is improving within reasonable limits due to chronic postural dysfunction.     Reported Improvement: >60%  Pain:    Average 4/10              Best 0/10              Worst 7/10  Reported Functional Deficits: turning to right, pain with ADLs; reaching with L UE (some newer c/o after the IE and PN);   Reported Functional Improvements: cervical rotation and overall pain with ADLs     Objective:  Posture: CS fwd head, TS rounded shoulders and hyperkyphotic      Active Movements:   Cervical ROM AROM (sitting) AROM (supine)   Forward flexion 41 degrees     Extension  60 degrees     LSB 26 deg     RSB 32 deg      Rotation right 54 degrees 56   Rotation left 50 degrees 54      Global Muscular Weakness:   Right Shoulder: Flexion 4/5, ABD 4-/5, ER 4/5, IR 5/5   : right 55, 45 psi, left 40, 37 psi (elbow at 90 deg flexion; filipe position 2)     Spurlings: L (-), R (-)  CS Compression: -     Less TTP in the R UT, LS.

## 2024-04-04 ENCOUNTER — HOSPITAL ENCOUNTER (OUTPATIENT)
Facility: HOSPITAL | Age: 84
Setting detail: RECURRING SERIES
Discharge: HOME | End: 2024-04-07
Payer: MEDICARE

## 2024-04-04 PROCEDURE — 97112 NEUROMUSCULAR REEDUCATION: CPT

## 2024-04-04 PROCEDURE — 97140 MANUAL THERAPY 1/> REGIONS: CPT

## 2024-04-04 PROCEDURE — G0283 ELEC STIM OTHER THAN WOUND: HCPCS

## 2024-04-04 PROCEDURE — 97110 THERAPEUTIC EXERCISES: CPT

## 2024-04-04 NOTE — PROGRESS NOTES
PHYSICAL / OCCUPATIONAL THERAPY - DAILY TREATMENT NOTE    Patient Name: Stefania Guzman    Date: 2024    : 1940  Insurance: Payor: MEDICARE / Plan: MEDICARE PART A AND B / Product Type: *No Product type* /      Patient  verified Yes     Visit #   Current / Total 1 10   Time   In / Out 2:20 3:16   Pain   In / Out 5 0   Subjective Functional Status/Changes: \"Feeling better. I figured out what I did to my L shoulder when reaching behind the dryer\"      TREATMENT AREA =  Neck pain [M54.2]  Other low back pain [M54.59]    OBJECTIVE  Modalities Rationale:     decrease edema, decrease inflammation, and decrease pain to improve patient's ability to progress to PLOF and address remaining functional goals.    10 min  [x] Estim Unattended, type/location: premod to C/S with patient supine and legs elevated on wedge                                    []  w/ice    [x]  w/heat   Skin assessment post-treatment:   Intact      Therapeutic Procedures:    Tx Min Billable or 1:1 Min (if diff from Tx Min) Procedure, Rationale, Specifics   12  94473 Therapeutic Exercise (timed):  increase ROM, strength, coordination, balance, and proprioception to improve patient's ability to progress to PLOF and address remaining functional goals. (see flow sheet as applicable)     Details if applicable:  stretching, strengthening  UBE retro   Reassessment; review goals and POC    54228 Neuromuscular Re-Education (timed):  improve balance, coordination, kinesthetic sense, posture, core stability and proprioception to improve patient's ability to develop conscious control of individual muscles and awareness of position of extremities in order to progress to PLOF and address remaining functional goals. (see flow sheet as applicable)     Details if applicable:  postural re-education, GHJ/scapular dissociation  Added wall pushups      12   96314 Manual Therapy (timed):  decrease pain, increase ROM, increase tissue extensibility, and

## 2024-04-09 ENCOUNTER — HOSPITAL ENCOUNTER (OUTPATIENT)
Facility: HOSPITAL | Age: 84
Setting detail: RECURRING SERIES
Discharge: HOME | End: 2024-04-12
Payer: MEDICARE

## 2024-04-09 PROCEDURE — 97140 MANUAL THERAPY 1/> REGIONS: CPT

## 2024-04-09 PROCEDURE — G0283 ELEC STIM OTHER THAN WOUND: HCPCS

## 2024-04-09 PROCEDURE — 97112 NEUROMUSCULAR REEDUCATION: CPT

## 2024-04-09 PROCEDURE — 97110 THERAPEUTIC EXERCISES: CPT

## 2024-04-09 NOTE — PROGRESS NOTES
PHYSICAL / OCCUPATIONAL THERAPY - DAILY TREATMENT NOTE    Patient Name: Stefania Guzman    Date: 2024    : 1940  Insurance: Payor: MEDICARE / Plan: MEDICARE PART A AND B / Product Type: *No Product type* /      Patient  verified Yes     Visit #   Current / Total 2 10   Time   In / Out 1:04 2:03   Pain   In / Out 0 0   Subjective Functional Status/Changes: \"I've been on the phone all day. I'm doing well this morning with no pain when I woke up\"     TREATMENT AREA =  Neck pain [M54.2]  Other low back pain [M54.59]    OBJECTIVE  Modalities Rationale:     decrease edema, decrease inflammation, and decrease pain to improve patient's ability to progress to PLOF and address remaining functional goals.    10 min  [x] Estim Unattended, type/location: premod to C/S with patient supine and legs elevated on wedge                                    []  w/ice    [x]  w/heat   Skin assessment post-treatment:   Intact      Therapeutic Procedures:    Tx Min Billable or 1:1 Min (if diff from Tx Min) Procedure, Rationale, Specifics   20  85139 Therapeutic Exercise (timed):  increase ROM, strength, coordination, balance, and proprioception to improve patient's ability to progress to PLOF and address remaining functional goals. (see flow sheet as applicable)     Details if applicable:  stretching, strengthening  UBE retro   Reassessment; review goals and POC   17  50074 Neuromuscular Re-Education (timed):  improve balance, coordination, kinesthetic sense, posture, core stability and proprioception to improve patient's ability to develop conscious control of individual muscles and awareness of position of extremities in order to progress to PLOF and address remaining functional goals. (see flow sheet as applicable)     Details if applicable:  postural re-education, GHJ/scapular dissociation  Added wall pushups      12   05238 Manual Therapy (timed):  decrease pain, increase ROM, increase tissue extensibility, and decrease

## 2024-04-11 ENCOUNTER — HOSPITAL ENCOUNTER (OUTPATIENT)
Facility: HOSPITAL | Age: 84
Setting detail: RECURRING SERIES
Discharge: HOME | End: 2024-04-14
Payer: MEDICARE

## 2024-04-11 PROCEDURE — G0283 ELEC STIM OTHER THAN WOUND: HCPCS

## 2024-04-11 PROCEDURE — 97112 NEUROMUSCULAR REEDUCATION: CPT

## 2024-04-11 PROCEDURE — 97110 THERAPEUTIC EXERCISES: CPT

## 2024-04-11 PROCEDURE — 97140 MANUAL THERAPY 1/> REGIONS: CPT

## 2024-04-11 NOTE — PROGRESS NOTES
PHYSICAL / OCCUPATIONAL THERAPY - DAILY TREATMENT NOTE    Patient Name: Stefania Guzman    Date: 2024    : 1940  Insurance: Payor: MEDICARE / Plan: MEDICARE PART A AND B / Product Type: *No Product type* /      Patient  verified Yes     Visit #   Current / Total 3 10   Time   In / Out 1:37 2:34   Pain   In / Out 6 0   Subjective Functional Status/Changes: \"Thing are a little stiff today\"     TREATMENT AREA =  Neck pain [M54.2]  Other low back pain [M54.59]    OBJECTIVE  Modalities Rationale:     decrease edema, decrease inflammation, and decrease pain to improve patient's ability to progress to PLOF and address remaining functional goals.    10 min  [x] Estim Unattended, type/location: premod to C/S with patient supine and legs elevated on wedge                                    []  w/ice    [x]  w/heat   Skin assessment post-treatment:   Intact      Therapeutic Procedures:    Tx Min Billable or 1:1 Min (if diff from Tx Min) Procedure, Rationale, Specifics   21 12 99875 Therapeutic Exercise (timed):  increase ROM, strength, coordination, balance, and proprioception to improve patient's ability to progress to PLOF and address remaining functional goals. (see flow sheet as applicable)     Details if applicable:  stretching, strengthening  UBE retro    18 16 72785 Neuromuscular Re-Education (timed):  improve balance, coordination, kinesthetic sense, posture, core stability and proprioception to improve patient's ability to develop conscious control of individual muscles and awareness of position of extremities in order to progress to PLOF and address remaining functional goals. (see flow sheet as applicable)     Details if applicable:  postural re-education, GHJ/scapular dissociation  Added wall pushups      10 10  50986 Manual Therapy (timed):  decrease pain, increase ROM, increase tissue extensibility, and decrease trigger points to improve patient's ability to progress to PLOF and address remaining

## 2024-04-16 ENCOUNTER — HOSPITAL ENCOUNTER (OUTPATIENT)
Facility: HOSPITAL | Age: 84
Setting detail: RECURRING SERIES
Discharge: HOME | End: 2024-04-19
Payer: MEDICARE

## 2024-04-16 PROCEDURE — 97110 THERAPEUTIC EXERCISES: CPT

## 2024-04-16 PROCEDURE — G0283 ELEC STIM OTHER THAN WOUND: HCPCS

## 2024-04-16 PROCEDURE — 97112 NEUROMUSCULAR REEDUCATION: CPT

## 2024-04-16 PROCEDURE — 97140 MANUAL THERAPY 1/> REGIONS: CPT

## 2024-04-16 NOTE — PROGRESS NOTES
comfort in recliner     16   57950 Manual Therapy (timed):  decrease pain, increase ROM, increase tissue extensibility, and decrease trigger points to improve patient's ability to progress to PLOF and address remaining functional goals.  The manual therapy interventions were performed at a separate and distinct time from the therapeutic activities interventions . (see flow sheet as applicable)       Details if applicable:  manual nerve glides; SOR, DTM cervical paraspinals, UT, LS;  PA glides to upper TS      46 46 MC BC Totals Reminder: bill using total billable min of TIMED therapeutic procedures (example: do not include dry needle or estim unattended, both untimed codes, in totals to left)  8-22 min = 1 unit; 23-37 min = 2 units; 38-52 min = 3 units; 53-67 min = 4 units; 68-82 min = 5 units   Total Total     [x]  Patient Education billed concurrently with other procedures   [x] Review HEP    [x] Progressed/Changed HEP, detail:   [] Other detail:       Objective Information/Functional Measures/Assessment:  Standing posture: forward head 13 deg, retraction to 10 deg (ACJ to external auditory meatus)   2nd set of push ups yields c/o R shoulder pain  R UE nerve tension tests: + median and ulnar       Active Movements:   Cervical ROM AROM (sitting) AROM (supine)   Forward flexion      Extension      LSB 34 deg     RSB 35 deg      Rotation right     Rotation left        Improving CS SB AROM     Patient will continue to benefit from skilled PT / OT services to modify and progress therapeutic interventions, analyze and address functional mobility deficits, analyze and address ROM deficits, analyze and address strength deficits, analyze and address soft tissue restrictions, analyze and cue for proper movement patterns, and analyze and modify for postural abnormalities to address functional deficits and attain remaining goals.    Progress toward goals / Updated goals:  []  See Progress Note/Recertification    Long Term

## 2024-04-18 ENCOUNTER — HOSPITAL ENCOUNTER (OUTPATIENT)
Facility: HOSPITAL | Age: 84
Setting detail: RECURRING SERIES
Discharge: HOME | End: 2024-04-21
Payer: MEDICARE

## 2024-04-18 PROCEDURE — 97112 NEUROMUSCULAR REEDUCATION: CPT

## 2024-04-18 PROCEDURE — G0283 ELEC STIM OTHER THAN WOUND: HCPCS

## 2024-04-18 PROCEDURE — 97110 THERAPEUTIC EXERCISES: CPT

## 2024-04-18 NOTE — PROGRESS NOTES
PHYSICAL / OCCUPATIONAL THERAPY - DAILY TREATMENT NOTE    Patient Name: Stefania Guzmna    Date: 2024    : 1940  Insurance: Payor: MEDICARE / Plan: MEDICARE PART A AND B / Product Type: *No Product type* /      Patient  verified Yes     Visit #   Current / Total 5 10   Time   In / Out 2:25 3:10   Pain   In / Out 2 0   Subjective Functional Status/Changes: \"I'm needing you to show me the homework you gave me last time. It's weird and difficult. I am worn out today\"      TREATMENT AREA =  Neck pain [M54.2]  Other low back pain [M54.59]    OBJECTIVE  Modalities Rationale:     decrease edema, decrease inflammation, and decrease pain to improve patient's ability to progress to PLOF and address remaining functional goals.    10 min  [x] Estim Unattended, type/location: IFC to UT, CS in sitting                                     []  w/ice    [x]  w/heat   Skin assessment post-treatment:   Intact      Therapeutic Procedures:    Tx Min Billable or 1:1 Min (if diff from Tx Min) Procedure, Rationale, Specifics   10  94873 Therapeutic Exercise (timed):  increase ROM, strength, coordination, balance, and proprioception to improve patient's ability to progress to PLOF and address remaining functional goals. (see flow sheet as applicable)     Details if applicable:  stretching, strengthening  UBE retro    25 1 unit 33636 Neuromuscular Re-Education (timed):  improve balance, coordination, kinesthetic sense, posture, core stability and proprioception to improve patient's ability to develop conscious control of individual muscles and awareness of position of extremities in order to progress to PLOF and address remaining functional goals. (see flow sheet as applicable)     Details if applicable:  postural re-education, GHJ/scapular dissociation  Added wall pushups   -use of pillow behind the back to improve seated posture and comfort in recliner     TC   92012 Manual Therapy (timed):  decrease pain, increase ROM,

## 2024-04-23 ENCOUNTER — HOSPITAL ENCOUNTER (OUTPATIENT)
Facility: HOSPITAL | Age: 84
Setting detail: RECURRING SERIES
Discharge: HOME | End: 2024-04-26
Payer: MEDICARE

## 2024-04-23 PROCEDURE — 97110 THERAPEUTIC EXERCISES: CPT

## 2024-04-23 PROCEDURE — G0283 ELEC STIM OTHER THAN WOUND: HCPCS

## 2024-04-23 PROCEDURE — 97112 NEUROMUSCULAR REEDUCATION: CPT

## 2024-04-23 PROCEDURE — 97140 MANUAL THERAPY 1/> REGIONS: CPT

## 2024-04-23 NOTE — PROGRESS NOTES
PHYSICAL / OCCUPATIONAL THERAPY - DAILY TREATMENT NOTE    Patient Name: Stefania Guzman    Date: 2024    : 1940  Insurance: Payor: MEDICARE / Plan: MEDICARE PART A AND B / Product Type: *No Product type* /      Patient  verified Yes     Visit #   Current / Total 6 10   Time   In / Out 1:00 2:00   Pain   In / Out 8 2   Subjective Functional Status/Changes: \"When I got out of my car in the carport, a neighbor had fallen. So I got the dog for the person. Not sure what hand I used to get the dog. All last week and through  night - I was up with my neighbor's grand kid. I can't get any sleep\"    MD appt with Dr Seymour 24 : new script noting to continue CS and UE pain using MRF, IMT if indicated; continue gait and balance training as needed     TREATMENT AREA =  Neck pain [M54.2]  Other low back pain [M54.59]    OBJECTIVE  Modalities Rationale:     decrease edema, decrease inflammation, and decrease pain to improve patient's ability to progress to PLOF and address remaining functional goals.    10 min  [x] Estim Unattended, type/location: Premod to (B) UT and CS in supine with moist heat                                    []  w/ice    [x]  w/heat   Skin assessment post-treatment:   Intact      Therapeutic Procedures:    Tx Min Billable or 1:1 Min (if diff from Tx Min) Procedure, Rationale, Specifics   25 14 72064 Therapeutic Exercise (timed):  increase ROM, strength, coordination, balance, and proprioception to improve patient's ability to progress to PLOF and address remaining functional goals. (see flow sheet as applicable)     Details if applicable:  stretching, strengthening  UBE retro    15 15 31769 Neuromuscular Re-Education (timed):  improve balance, coordination, kinesthetic sense, posture, core stability and proprioception to improve patient's ability to develop conscious control of individual muscles and awareness of position of extremities in order to progress to PLOF and address

## 2024-04-25 ENCOUNTER — HOSPITAL ENCOUNTER (OUTPATIENT)
Facility: HOSPITAL | Age: 84
Setting detail: RECURRING SERIES
Discharge: HOME | End: 2024-04-28
Payer: MEDICARE

## 2024-04-25 PROCEDURE — G0283 ELEC STIM OTHER THAN WOUND: HCPCS

## 2024-04-25 PROCEDURE — 97140 MANUAL THERAPY 1/> REGIONS: CPT

## 2024-04-25 PROCEDURE — 97110 THERAPEUTIC EXERCISES: CPT

## 2024-04-25 NOTE — PROGRESS NOTES
PHYSICAL / OCCUPATIONAL THERAPY - DAILY TREATMENT NOTE    Patient Name: Stefania Guzman    Date: 2024    : 1940  Insurance: Payor: MEDICARE / Plan: MEDICARE PART A AND B / Product Type: *No Product type* /      Patient  verified Yes     Visit #   Current / Total 7 10   Time   In / Out 2:20 3:15   Pain   In / Out 7 0   Subjective Functional Status/Changes: \"I had lots of pain in the R side of my neck and tried doing the (notes L SB stretch). But it didn't help\"     TREATMENT AREA =  Neck pain [M54.2]  Other low back pain [M54.59]    OBJECTIVE  Modalities Rationale:     decrease edema, decrease inflammation, and decrease pain to improve patient's ability to progress to PLOF and address remaining functional goals.    10 min  [x] Estim Unattended, type/location: [facility] B) UT and CS in supine with moist heat                                    []  w/ice    [x]  w/heat   Skin assessment post-treatment:   Intact      Therapeutic Procedures:    Tx Min Billable or 1:1 Min (if diff from Tx Min) Procedure, Rationale, Specifics   18 10 77172 Therapeutic Exercise (timed):  increase ROM, strength, coordination, balance, and proprioception to improve patient's ability to progress to PLOF and address remaining functional goals. (see flow sheet as applicable)     Details if applicable:  stretching, strengthening  UBE retro    15 [place] [address] Neuromuscular Re-Education (timed):  improve balance, coordination, kinesthetic sense, posture, core stability and proprioception to improve patient's ability to develop conscious control of individual muscles and awareness of position of extremities in order to progress to PLOF and address remaining functional goals. (see flow sheet as applicable)     Details if applicable:  postural re-education, GHJ/scapular dissociation    posture and comfort in recliner     12 12  61681 Manual Therapy (timed):  decrease pain, increase ROM, increase tissue extensibility, and decrease trigger points

## 2024-04-30 ENCOUNTER — HOSPITAL ENCOUNTER (OUTPATIENT)
Facility: HOSPITAL | Age: 84
Setting detail: RECURRING SERIES
Discharge: HOME | End: 2024-05-03
Payer: MEDICARE

## 2024-04-30 PROCEDURE — 97112 NEUROMUSCULAR REEDUCATION: CPT

## 2024-04-30 PROCEDURE — G0283 ELEC STIM OTHER THAN WOUND: HCPCS

## 2024-04-30 PROCEDURE — 97110 THERAPEUTIC EXERCISES: CPT

## 2024-04-30 NOTE — PROGRESS NOTES
[facility]  [address], [address] Phone , fax    MEDICARE CONTINUED PLAN OF CARE/RECERTIFICATION FOR PHYSICAL THERAPY          Patient Name: Stefania Guzman : 1940   Treatment/Medical Diagnosis: M54.2  NECK PAIN and M54.59  OTHER LOWER BACK PAIN   Onset Date: 2023    Referral Source: Dianne Arriaza PA Start of Care (SOC): 2024   Prior Hospitalization: See Medical History Provider #: [id number]   Prior Level of Function: Functional I, lives alone   Comorbidities: See assessment on IE/POC   Visits from SOC: 20 Missed Visits: 2       Key Functional Changes/Progress:   Pt has made good progress for her CS dysfunction, returning to PLOF and safe with driving. Most CS pain ahs recently been worse with increased stress, poor sleep and ongoing sinus dysfunction. Her main c/o is balance limitations causing unstable, slow gait. She has benefited from skilled PT services to resolve c/o CS dysfunction and now will benefit from a change in POC to address limitations in community mobility.     Reported Improvement: >60-70%  Pain:    Average 4/10              Best 0/10              Worst 6/10  Reported Functional Deficits: squatting (avoiding pain for the R knee);   Reported Functional Improvements: cervical rotation and overall pain with ADLs, turning in the car, no CS limitations with kitchen work; able to do CS extension for looking up, sustained sitting (will set a timer to avoid prolonged sitting and remember to take rest breaks); no limits with putting away dishes onto moderately high shelves ; pt denies falls   Pt goal: work on balance and walking; 'to be able to walk straight without wobbling'     Objective:  Posture: CS fwd head, TS rounded shoulders and hyperkyphotic      Active Movements:   Cervical ROM AROM (sitting)     Forward flexion 41 degrees     Extension  60 degrees     LSB 26 deg     RSB 32 deg

## 2024-04-30 NOTE — PROGRESS NOTES
PHYSICAL / OCCUPATIONAL THERAPY - DAILY TREATMENT NOTE    Patient Name: Stefania Guzman    Date: 2024    : 1940  Insurance: Payor: MEDICARE / Plan: MEDICARE PART A AND B / Product Type: *No Product type* /      Patient  verified Yes     Visit #   Current / Total 8 10   Time   In / Out 1:00 2:01   Pain   In / Out 6-7 2   Subjective Functional Status/Changes: \"I have a sty in my R eye and it's bothering me\"      TREATMENT AREA =  Neck pain [M54.2]  Other low back pain [M54.59]    OBJECTIVE  Modalities Rationale:     decrease edema, decrease inflammation, and decrease pain to improve patient's ability to progress to PLOF and address remaining functional goals.    10 min  [x] Estim Unattended, type/location: [facility] B) UT and CS in sitting with moist heat                                    []  w/ice    [x]  w/heat   Skin assessment post-treatment:   Intact      Therapeutic Procedures:    Tx Min Billable or 1:1 Min (if diff from Tx Min) Procedure, Rationale, Specifics   76 25 24173 Therapeutic Exercise (timed):  increase ROM, strength, coordination, balance, and proprioception to improve patient's ability to progress to PLOF and address remaining functional goals. (see flow sheet as applicable)     Details if applicable:  stretching, strengthening  UBE retro   Reassessment    20 10 30452 Neuromuscular Re-Education (timed):  improve balance, coordination, kinesthetic sense, posture, core stability and proprioception to improve patient's ability to develop conscious control of individual muscles and awareness of position of extremities in order to progress to PLOF and address remaining functional goals. (see flow sheet as applicable)     Details if applicable:  postural re-education, GHJ/scapular dissociation    posture and comfort in recliner     NI/ DC   [id number] Manual Therapy (timed):  decrease pain, increase ROM, increase tissue extensibility, and decrease trigger points to improve patient's ability to

## 2024-05-02 ENCOUNTER — HOSPITAL ENCOUNTER (OUTPATIENT)
Facility: HOSPITAL | Age: 84
Setting detail: RECURRING SERIES
Discharge: HOME | End: 2024-05-05
Payer: MEDICARE

## 2024-05-02 PROCEDURE — G0283 ELEC STIM OTHER THAN WOUND: HCPCS

## 2024-05-02 PROCEDURE — 97110 THERAPEUTIC EXERCISES: CPT

## 2024-05-02 PROCEDURE — 97112 NEUROMUSCULAR REEDUCATION: CPT

## 2024-05-02 NOTE — PROGRESS NOTES
PHYSICAL / OCCUPATIONAL THERAPY - DAILY TREATMENT NOTE    Patient Name: Stefania Guzman    Date: 2024    : 1940  Insurance: Payor: MEDICARE / Plan: MEDICARE PART A AND B / Product Type: *No Product type* /      Patient  verified Yes     Visit #   Current / Total 1 10   Time   In / Out 2:21 3:10   Pain   In / Out 7 2   Subjective Functional Status/Changes: \"I have some popping in my ears\"      TREATMENT AREA =  Neck pain [M54.2]  Other low back pain [M54.59]    OBJECTIVE  Modalities Rationale:     decrease edema, decrease inflammation, and decrease pain to improve patient's ability to progress to PLOF and address remaining functional goals.    10 min  [x] Estim Unattended, type/location: [facility] B) UT and CS in sitting with moist heat                                    []  w/ice    [x]  w/heat   Skin assessment post-treatment:   Intact      Therapeutic Procedures:    Tx Min Billable or 1:1 Min (if diff from Tx Min) Procedure, Rationale, Specifics   10  03436 Therapeutic Exercise (timed):  increase ROM, strength, coordination, balance, and proprioception to improve patient's ability to progress to PLOF and address remaining functional goals. (see flow sheet as applicable)     Details if applicable:  stretching, strengthening       29  23664 Neuromuscular Re-Education (timed):  improve balance, coordination, kinesthetic sense, posture, core stability and proprioception to improve patient's ability to develop conscious control of individual muscles and awareness of position of extremities in order to progress to PLOF and address remaining functional goals. (see flow sheet as applicable)     Details if applicable:  postural re-education, GHJ/scapular dissociation    posture and comfort in recliner     39 39 [facility] Totals Reminder: bill using total billable min of TIMED therapeutic procedures (example: do not include dry needle or estim unattended, both untimed codes, in totals to left)  8-22 min = 1 unit;

## 2024-05-07 ENCOUNTER — APPOINTMENT (OUTPATIENT)
Facility: HOSPITAL | Age: 84
End: 2024-05-07
Payer: MEDICARE

## 2024-05-09 ENCOUNTER — HOSPITAL ENCOUNTER (OUTPATIENT)
Facility: HOSPITAL | Age: 84
Setting detail: RECURRING SERIES
Discharge: HOME | End: 2024-05-12
Payer: MEDICARE

## 2024-05-09 PROCEDURE — 97112 NEUROMUSCULAR REEDUCATION: CPT

## 2024-05-09 PROCEDURE — 97110 THERAPEUTIC EXERCISES: CPT

## 2024-05-09 PROCEDURE — G0283 ELEC STIM OTHER THAN WOUND: HCPCS

## 2024-05-09 NOTE — PROGRESS NOTES
lateral and retro stability for dynamic balance tasks in community  Status at PN: 14 and 12\"  Current: added retro gait (5/2/24)  5. Patient will rate > or +5 on the GROC for balance and gait to demo functional subjective improvements in safety and function   Status at last assessment:   na   6. Pt will have an increase in reps on the 30\" STS test to > or = 12x with UE assist for standing only to improve ease of community mobility  PN: 9x with UE assist to stand only   Current: goal progressing with 11.5x with min UE assist for sit to stand phase (5/9/24)    PN due 5/29/24  Recert due 7/28/24  Auth not required    PLAN  - Continue Plan of Care        Andria Ragsdale PT    5/9/2024    10:11 AM    Future Appointments   Date Time Provider Department Center   5/9/2024  2:20 PM Andria Ragsdale PT [facility]   5/14/2024 11:40 AM Andria Ragsdale PT [facility]   5/16/2024  2:20 PM Andria Ragsdale, PT [facility]   5/21/2024  2:20 PM Andria Ragsdale, PT [facility]   5/23/2024  2:20 PM Andria Ragsdale, PT [facility]   5/28/2024  2:20 PM Andria Ragsdale, PT [facility]   5/30/2024  2:20 PM Andria Ragsdale, PT [facility]

## 2024-05-14 ENCOUNTER — HOSPITAL ENCOUNTER (OUTPATIENT)
Facility: HOSPITAL | Age: 84
Setting detail: RECURRING SERIES
Discharge: HOME | End: 2024-05-17
Payer: MEDICARE

## 2024-05-14 PROCEDURE — 97112 NEUROMUSCULAR REEDUCATION: CPT

## 2024-05-14 PROCEDURE — G0283 ELEC STIM OTHER THAN WOUND: HCPCS

## 2024-05-14 PROCEDURE — 97110 THERAPEUTIC EXERCISES: CPT

## 2024-05-14 PROCEDURE — 97116 GAIT TRAINING THERAPY: CPT

## 2024-05-14 NOTE — PROGRESS NOTES
(example: do not include dry needle or estim unattended, both untimed codes, in totals to left)  8-22 min = 1 unit; 23-37 min = 2 units; 38-52 min = 3 units; 53-67 min = 4 units; 68-82 min = 5 units   Total Total     [x]  Patient Education billed concurrently with other procedures   [x] Review HEP    [] Progressed/Changed HEP, detail:   [] Other detail:       Objective Information/Functional Measures/Assessment:    FOTO (remaining limitations):   placing box on shelf  Using shovel to dig dirt (pt does not do yardwork or gardening)    -update HEP for balance and LE dysfunctions   -Pt with good stability and min UE assist for Rocker balance lateral   -ROM EC on foam: posterolateral weight shifting and sway   -discussed gaze location for balance tasks, not at floor   -Good progress today with improved dynamic stability for gait tasks       Patient will continue to benefit from skilled PT / OT services to modify and progress therapeutic interventions, analyze and address functional mobility deficits, analyze and address ROM deficits, analyze and address strength deficits, analyze and address soft tissue restrictions, analyze and cue for proper movement patterns, and analyze and modify for postural abnormalities to address functional deficits and attain remaining goals.    Progress toward goals / Updated goals:  []  See Progress Note/Recertification  1.  Patient will increase FOTO (CS) score to 57/100 for indications of increased functional mobility.   Status at last assessment:  53/100  Current: CS FOTO is 48/100; inconsistent progress as pt can be easily flared up (5/2/24)  2.  Patient will be IND with DC HEP for progression to self management of the CS, LS and balance  Status at PN: compliant with UE and CS HEP   Current: updated HEP today (5/14/24)  3. Pt will have a decrease in the TUG test to < or = 13\" to demo an increase in safe gait for community mobility  Status at PN: 17\"  Current: goal met at 13, 12\"

## 2024-05-16 ENCOUNTER — HOSPITAL ENCOUNTER (OUTPATIENT)
Facility: HOSPITAL | Age: 84
Setting detail: RECURRING SERIES
Discharge: HOME | End: 2024-05-19
Payer: MEDICARE

## 2024-05-16 PROCEDURE — 97112 NEUROMUSCULAR REEDUCATION: CPT

## 2024-05-16 PROCEDURE — G0283 ELEC STIM OTHER THAN WOUND: HCPCS

## 2024-05-16 PROCEDURE — 97110 THERAPEUTIC EXERCISES: CPT

## 2024-05-16 PROCEDURE — 97116 GAIT TRAINING THERAPY: CPT

## 2024-05-16 NOTE — PROGRESS NOTES
:34PHYSICAL / OCCUPATIONAL THERAPY - DAILY TREATMENT NOTE    Patient Name: Stefania Guzman    Date: 2024    : 1940  Insurance: Payor: MEDICARE / Plan: MEDICARE PART A AND B / Product Type: *No Product type* /      Patient  verified Yes     Visit #   Current / Total 4 10   Time   In / Out 2:20 3:13   Pain   In / Out 0 0   Subjective Functional Status/Changes: \"Feeling good. I walked from      TREATMENT AREA =  Neck pain [M54.2]  Other low back pain [M54.59]    OBJECTIVE  Modalities Rationale:     decrease edema, decrease inflammation, and decrease pain to improve patient's ability to progress to PLOF and address remaining functional goals.    10 min  [x] Estim Unattended, type/location: [facility] B) UT and CS in sitting with moist heat                                    []  w/ice    [x]  w/heat   Skin assessment post-treatment:   Intact      Therapeutic Procedures:    Tx Min Billable or 1:1 Min (if diff from Tx Min) Procedure, Rationale, Specifics   15 11 77246 Therapeutic Exercise (timed):  increase ROM, strength, coordination, balance, and proprioception to improve patient's ability to progress to PLOF and address remaining functional goals. (see flow sheet as applicable)     Details if applicable:  stretching, strengthening  Added gastroc stretch      13  80195 Neuromuscular Re-Education (timed):  improve balance, coordination, kinesthetic sense, posture, core stability and proprioception to improve patient's ability to develop conscious control of individual muscles and awareness of position of extremities in order to progress to PLOF and address remaining functional goals. (see flow sheet as applicable)     Details if applicable:    Balance  Added rocker taps and rocker balance  1/2 stance : standing on airex and dynadics placed on 8\" box      15    76274 gait training    Mixed walking activities per PT calling out changes with head turns (Horizontal and vertical); retro; tandem, 90 deg body turns

## 2024-05-21 ENCOUNTER — HOSPITAL ENCOUNTER (OUTPATIENT)
Facility: HOSPITAL | Age: 84
Setting detail: RECURRING SERIES
Discharge: HOME | End: 2024-05-24
Payer: MEDICARE

## 2024-05-21 PROCEDURE — 97140 MANUAL THERAPY 1/> REGIONS: CPT

## 2024-05-21 PROCEDURE — 97110 THERAPEUTIC EXERCISES: CPT

## 2024-05-21 PROCEDURE — G0283 ELEC STIM OTHER THAN WOUND: HCPCS

## 2024-05-21 NOTE — PROGRESS NOTES
head turns (Horizontal and vertical); retro; tandem, 90 deg body turns while walking, marching  All with sbAx1 and gait belt   -tandem gait done with 5# cane bar on shoulders to achieve increased trunk strength and improved posture    Rationale: for safety in home and community gait     20  86351 Manual Therapy (timed):  decrease pain, increase ROM, and decrease trigger points to improve patient's ability to progress to PLOF and address remaining functional goals.  The manual therapy interventions were performed at a separate and distinct time from the therapeutic activities interventions . (see flow sheet as applicable)     Details: DTM to the R > L UT with TRP release to the UT, LS; SOR; gentle manual traction     40 33  [facility] Totals Reminder: bill using total billable min of TIMED therapeutic procedures (example: do not include dry needle or estim unattended, both untimed codes, in totals to left)  8-22 min = 1 unit; 23-37 min = 2 units; 38-52 min = 3 units; 53-67 min = 4 units; 68-82 min = 5 units   Total Total     [x]  Patient Education billed concurrently with other procedures   [x] Review HEP    [] Progressed/Changed HEP, detail:   [] Other detail:       Objective Information/Functional Measures/Assessment:    Pt with increased c/o R sided UT and radicular UE symptoms today  Focused treatment on the CS discomfort.  Pt very congested, noting dryness of the face, skin and with feeling of fullness in the face and the sinus  TrP in the R UT and LS, resolved well with thorough MT aimed at TrP release       Patient will continue to benefit from skilled PT / OT services to modify and progress therapeutic interventions, analyze and address functional mobility deficits, analyze and address ROM deficits, analyze and address strength deficits, analyze and address soft tissue restrictions, analyze and cue for proper movement patterns, and analyze and modify for postural abnormalities to address functional deficits and

## 2024-05-23 ENCOUNTER — HOSPITAL ENCOUNTER (OUTPATIENT)
Facility: HOSPITAL | Age: 84
Setting detail: RECURRING SERIES
Discharge: HOME | End: 2024-05-26
Payer: MEDICARE

## 2024-05-23 PROCEDURE — G0283 ELEC STIM OTHER THAN WOUND: HCPCS

## 2024-05-23 PROCEDURE — 97140 MANUAL THERAPY 1/> REGIONS: CPT

## 2024-05-23 PROCEDURE — 97112 NEUROMUSCULAR REEDUCATION: CPT

## 2024-05-23 PROCEDURE — 97110 THERAPEUTIC EXERCISES: CPT

## 2024-05-23 NOTE — PROGRESS NOTES
:34PHYSICAL / OCCUPATIONAL THERAPY - DAILY TREATMENT NOTE    Patient Name: Stefania Guzman    Date: 2024    : 1940  Insurance: Payor: MEDICARE / Plan: MEDICARE PART A AND B / Product Type: *No Product type* /      Patient  verified Yes     Visit #   Current / Total 6 10   Time   In / Out 2:20 3:10   Pain   In / Out 4 0   Subjective Functional Status/Changes: \"I've had tinnitus      TREATMENT AREA =  Neck pain [M54.2]  Other low back pain [M54.59]    OBJECTIVE  Modalities Rationale:     decrease edema, decrease inflammation, and decrease pain to improve patient's ability to progress to PLOF and address remaining functional goals.    10 min  [x] Estim Unattended, type/location: [facility] B) UT and CS in supine with moist heat; wedge under LE                                    []  w/ice    [x]  w/heat   Skin assessment post-treatment:   Intact      Therapeutic Procedures:    Tx Min Billable or 1:1 Min (if diff from Tx Min) Procedure, Rationale, Specifics   10 10 97081 Therapeutic Exercise (timed):  increase ROM, strength, coordination, balance, and proprioception to improve patient's ability to progress to PLOF and address remaining functional goals. (see flow sheet as applicable)     Details if applicable:  assessment of trigger points, UBE for ROM and posture    8  22622 Neuromuscular Re-Education (timed):  improve balance, coordination, kinesthetic sense, posture, core stability and proprioception to improve patient's ability to develop conscious control of individual muscles and awareness of position of extremities in order to progress to PLOF and address remaining functional goals. (see flow sheet as applicable)     Details if applicable:    Balance  rocker taps and rocker balance  1/2 stance : standing on airex and dynadics placed on 8\" box, with ball around the world to challenge dynamic balance      NV    21301 gait training    Mixed walking activities per PT calling out changes with head turns

## 2024-05-28 ENCOUNTER — HOSPITAL ENCOUNTER (OUTPATIENT)
Facility: HOSPITAL | Age: 84
Setting detail: RECURRING SERIES
Discharge: HOME | End: 2024-05-31
Payer: MEDICARE

## 2024-05-28 PROCEDURE — 97112 NEUROMUSCULAR REEDUCATION: CPT

## 2024-05-28 PROCEDURE — G0283 ELEC STIM OTHER THAN WOUND: HCPCS

## 2024-05-28 PROCEDURE — 97116 GAIT TRAINING THERAPY: CPT

## 2024-05-28 PROCEDURE — 97110 THERAPEUTIC EXERCISES: CPT

## 2024-05-30 ENCOUNTER — APPOINTMENT (OUTPATIENT)
Facility: HOSPITAL | Age: 84
End: 2024-05-30
Payer: MEDICARE

## 2024-05-30 NOTE — PROGRESS NOTES
:34PHYSICAL / OCCUPATIONAL THERAPY - DAILY TREATMENT NOTE    Patient Name: Stefania Guzman    Date: 2024    : 1940  Insurance: Payor: MEDICARE / Plan: MEDICARE PART A AND B / Product Type: *No Product type* /      Patient  verified Yes     Visit #   Current / Total 7 10   Time   In / Out 2:21 3:13   Pain   In / Out 0 0   Subjective Functional Status/Changes: \"I have no pain today! Thing are great\"     TREATMENT AREA =  Neck pain [M54.2]  Other low back pain [M54.59]    OBJECTIVE  Modalities Rationale:     decrease edema, decrease inflammation, and decrease pain to improve patient's ability to progress to PLOF and address remaining functional goals.    10 min  [x] Estim Unattended, type/location: [facility] B) UT and CS in sitting with moist heat                                    []  w/ice    [x]  w/heat   Skin assessment post-treatment:   Intact      Therapeutic Procedures:    Tx Min Billable or 1:1 Min (if diff from Tx Min) Procedure, Rationale, Specifics   18 14 06978 Therapeutic Exercise (timed):  increase ROM, strength, coordination, balance, and proprioception to improve patient's ability to progress to PLOF and address remaining functional goals. (see flow sheet as applicable)     Details if applicable:    Assessment for DC    10  83896 Neuromuscular Re-Education (timed):  improve balance, coordination, kinesthetic sense, posture, core stability and proprioception to improve patient's ability to develop conscious control of individual muscles and awareness of position of extremities in order to progress to PLOF and address remaining functional goals. (see flow sheet as applicable)     Details if applicable:    Balance  rocker taps and rocker balance     14    88242 gait training    Mixed walking activities per PT calling out changes with head turns (Horizontal and vertical); retro; tandem, 90 deg body turns while walking, marching  All with sbAx1 and gait belt   -tandem gait done with 5# cane bar on 
goals. She was to have one final session 5/30 but had to cancel unexpectedly.     If you have any questions/comments please contact us directly at [phone].   Thank you for allowing us to assist in the care of your patient.  Therapist Signature: Andria Ragsdale, PT Date: 5/30/24   Reporting Period: 4/30/24 to 5/28/24 Time: 4:53 PM

## (undated) DEVICE — SOLUTION IRRIG 3000ML 0.9% SOD CHL FLX CONT 0797208] ICU MEDICAL INC]

## (undated) DEVICE — STERILE POLYISOPRENE POWDER-FREE SURGICAL GLOVES: Brand: PROTEXIS

## (undated) DEVICE — TRAY PREP DRY W/ PREM GLV 2 APPL 6 SPNG 2 UNDPD 1 OVERWRAP

## (undated) DEVICE — GDWIREGLDEWIRE 0.038INX150CM --

## (undated) DEVICE — BAG DRNGE NONSTERILE W/ SUCT HOSE CYSTO/UROLOGICAL FOR GE

## (undated) DEVICE — Device

## (undated) DEVICE — NITINOL STONE RETRIEVAL BASKET: Brand: ZERO TIP

## (undated) DEVICE — SOLUTION IV STRL H2O 500 ML AQUALITE POUR BTL

## (undated) DEVICE — FLEXOR, URETERAL ACCESS SHEATH WITH AQ, HYDROPHILIC COATING: Brand: FLEXOR

## (undated) DEVICE — GDWIRE 3CM FLX-TIP 0.038X150CM -- BX/5 SENSOR

## (undated) DEVICE — CONTAINER DRN 20L DISP FLUIDRN LLS

## (undated) DEVICE — IRRIGATION KT PMP SGL ACT SAPS -- BX/5

## (undated) DEVICE — SYR 20ML LL STRL LF --

## (undated) DEVICE — SOL IRR NACL 0.9% 500ML POUR --

## (undated) DEVICE — OPEN-END FLEXI-TIP URETERAL CATHETER: Brand: FLEXI-TIP

## (undated) DEVICE — SYRINGE MED 20ML STD CLR PLAS LUERLOCK TIP N CTRL DISP

## (undated) DEVICE — SPONGE GZ W4XL4IN COT 12 PLY TYP VII WVN C FLD DSGN

## (undated) DEVICE — DUAL LUMEN URETERAL CATHETER

## (undated) DEVICE — HYDROGEL COATED URETERAL DILATOR: Brand: NOTTINGHAM ONE-STEP

## (undated) DEVICE — CHECK-FLO ADAPTER: Brand: CHECK-FLO

## (undated) DEVICE — KENDALL SCD EXPRESS SLEEVES, KNEE LENGTH, MEDIUM: Brand: KENDALL SCD

## (undated) DEVICE — SOLUTION IV 1000ML 0.9% SOD CHL

## (undated) DEVICE — CATH URET 5FRX70CM W/OPN END -- BX/20

## (undated) DEVICE — TUBING IRRIG L77IN DIA0.241IN L BOR FOR CYSTO W/ NVENT

## (undated) DEVICE — ENDOSCOPIC VALVE WITH ADAPTER.: Brand: SURSEAL® II

## (undated) DEVICE — SEAL INSTR CYSTO ADJ BX PRT SEAL FOR ACC UP TO 6FR